# Patient Record
Sex: MALE | Race: WHITE | NOT HISPANIC OR LATINO | Employment: OTHER | URBAN - METROPOLITAN AREA
[De-identification: names, ages, dates, MRNs, and addresses within clinical notes are randomized per-mention and may not be internally consistent; named-entity substitution may affect disease eponyms.]

---

## 2018-02-14 ENCOUNTER — OFFICE VISIT CONVERTED (OUTPATIENT)
Dept: FAMILY MEDICINE CLINIC | Facility: CLINIC | Age: 53
End: 2018-02-14
Attending: FAMILY MEDICINE

## 2018-03-26 ENCOUNTER — OFFICE VISIT CONVERTED (OUTPATIENT)
Dept: FAMILY MEDICINE CLINIC | Facility: CLINIC | Age: 53
End: 2018-03-26
Attending: FAMILY MEDICINE

## 2018-08-07 ENCOUNTER — OFFICE VISIT CONVERTED (OUTPATIENT)
Dept: FAMILY MEDICINE CLINIC | Facility: CLINIC | Age: 53
End: 2018-08-07
Attending: FAMILY MEDICINE

## 2018-08-16 ENCOUNTER — OFFICE VISIT CONVERTED (OUTPATIENT)
Dept: FAMILY MEDICINE CLINIC | Facility: CLINIC | Age: 53
End: 2018-08-16
Attending: NURSE PRACTITIONER

## 2018-10-26 ENCOUNTER — OFFICE VISIT CONVERTED (OUTPATIENT)
Dept: FAMILY MEDICINE CLINIC | Facility: CLINIC | Age: 53
End: 2018-10-26
Attending: FAMILY MEDICINE

## 2019-03-01 ENCOUNTER — OFFICE VISIT CONVERTED (OUTPATIENT)
Dept: FAMILY MEDICINE CLINIC | Facility: CLINIC | Age: 54
End: 2019-03-01
Attending: FAMILY MEDICINE

## 2019-03-01 ENCOUNTER — HOSPITAL ENCOUNTER (OUTPATIENT)
Dept: FAMILY MEDICINE CLINIC | Facility: CLINIC | Age: 54
Discharge: HOME | End: 2019-03-01

## 2019-03-01 LAB
25(OH)D3 SERPL-MCNC: 29 NG/ML (ref 30–100)
ALBUMIN SERPL-MCNC: 4.1 G/DL (ref 3.5–5)
ALBUMIN/GLOB SERPL: 1 {RATIO} (ref 1.4–2.6)
ALP SERPL-CCNC: 95 U/L (ref 56–119)
ALT SERPL-CCNC: 12 U/L (ref 10–40)
ANION GAP SERPL CALC-SCNC: 17 MMOL/L (ref 8–19)
AST SERPL-CCNC: 15 U/L (ref 15–50)
BASOPHILS # BLD AUTO: 0.07 10*3/UL (ref 0–0.2)
BASOPHILS NFR BLD AUTO: 0.6 % (ref 0–3)
BILIRUB SERPL-MCNC: 0.5 MG/DL (ref 0.2–1.3)
BUN SERPL-MCNC: 8 MG/DL (ref 5–25)
BUN/CREAT SERPL: 13 {RATIO} (ref 6–20)
CALCIUM SERPL-MCNC: 9.5 MG/DL (ref 8.7–10.4)
CHLORIDE SERPL-SCNC: 101 MMOL/L (ref 99–111)
CHOLEST SERPL-MCNC: 169 MG/DL (ref 107–200)
CHOLEST/HDLC SERPL: 3.5 {RATIO} (ref 3–6)
CONV ABS IMM GRAN: 0.03 10*3/UL (ref 0–0.2)
CONV CO2: 25 MMOL/L (ref 22–32)
CONV CREATININE URINE, RANDOM: 164.3 MG/DL (ref 10–300)
CONV IMMATURE GRAN: 0.3 % (ref 0–1.8)
CONV MICROALBUM.,U,RANDOM: 34.4 MG/L (ref 0–20)
CONV TOTAL PROTEIN: 8.1 G/DL (ref 6.3–8.2)
CREAT UR-MCNC: 0.64 MG/DL (ref 0.7–1.2)
DEPRECATED RDW RBC AUTO: 45.5 FL (ref 35.1–43.9)
EOSINOPHIL # BLD AUTO: 0.53 10*3/UL (ref 0–0.7)
EOSINOPHIL # BLD AUTO: 4.5 % (ref 0–7)
ERYTHROCYTE [DISTWIDTH] IN BLOOD BY AUTOMATED COUNT: 14.2 % (ref 11.6–14.4)
EST. AVERAGE GLUCOSE BLD GHB EST-MCNC: 154 MG/DL
GFR SERPLBLD BASED ON 1.73 SQ M-ARVRAT: >60 ML/MIN/{1.73_M2}
GLOBULIN UR ELPH-MCNC: 4 G/DL (ref 2–3.5)
GLUCOSE SERPL-MCNC: 115 MG/DL (ref 70–99)
HBA1C MFR BLD: 18.2 G/DL (ref 14–18)
HBA1C MFR BLD: 7 % (ref 3.5–5.7)
HCT VFR BLD AUTO: 55.5 % (ref 42–52)
HDLC SERPL-MCNC: 48 MG/DL (ref 40–60)
LDLC SERPL CALC-MCNC: 93 MG/DL (ref 70–100)
LYMPHOCYTES # BLD AUTO: 4.05 10*3/UL (ref 1–5)
MCH RBC QN AUTO: 28.6 PG (ref 27–31)
MCHC RBC AUTO-ENTMCNC: 32.8 G/DL (ref 33–37)
MCV RBC AUTO: 87.1 FL (ref 80–96)
MICROALBUMIN/CREAT UR: 20.9 MG/G{CRE} (ref 0–25)
MONOCYTES # BLD AUTO: 0.78 10*3/UL (ref 0.2–1.2)
MONOCYTES NFR BLD AUTO: 6.6 % (ref 3–10)
NEUTROPHILS # BLD AUTO: 6.33 10*3/UL (ref 2–8)
NEUTROPHILS NFR BLD AUTO: 53.6 % (ref 30–85)
NRBC CBCN: 0 % (ref 0–0.7)
OSMOLALITY SERPL CALC.SUM OF ELEC: 287 MOSM/KG (ref 273–304)
PLATELET # BLD AUTO: 230 10*3/UL (ref 130–400)
PMV BLD AUTO: 12.1 FL (ref 9.4–12.4)
POTASSIUM SERPL-SCNC: 4.1 MMOL/L (ref 3.5–5.3)
RBC # BLD AUTO: 6.37 10*6/UL (ref 4.7–6.1)
SODIUM SERPL-SCNC: 139 MMOL/L (ref 135–147)
TRIGL SERPL-MCNC: 138 MG/DL (ref 40–150)
VARIANT LYMPHS NFR BLD MANUAL: 34.4 % (ref 20–45)
VLDLC SERPL-MCNC: 28 MG/DL (ref 5–37)
WBC # BLD AUTO: 11.79 10*3/UL (ref 4.8–10.8)

## 2019-05-06 ENCOUNTER — OFFICE VISIT CONVERTED (OUTPATIENT)
Dept: FAMILY MEDICINE CLINIC | Facility: CLINIC | Age: 54
End: 2019-05-06
Attending: FAMILY MEDICINE

## 2019-05-06 ENCOUNTER — CONVERSION ENCOUNTER (OUTPATIENT)
Dept: FAMILY MEDICINE CLINIC | Facility: CLINIC | Age: 54
End: 2019-05-06

## 2019-07-17 ENCOUNTER — OFFICE VISIT CONVERTED (OUTPATIENT)
Dept: FAMILY MEDICINE CLINIC | Facility: CLINIC | Age: 54
End: 2019-07-17
Attending: FAMILY MEDICINE

## 2019-09-23 ENCOUNTER — HOSPITAL ENCOUNTER (OUTPATIENT)
Dept: FAMILY MEDICINE CLINIC | Facility: CLINIC | Age: 54
Discharge: HOME | End: 2019-09-23

## 2019-09-23 ENCOUNTER — OFFICE VISIT CONVERTED (OUTPATIENT)
Dept: FAMILY MEDICINE CLINIC | Facility: CLINIC | Age: 54
End: 2019-09-23
Attending: FAMILY MEDICINE

## 2019-09-23 ENCOUNTER — CONVERSION ENCOUNTER (OUTPATIENT)
Dept: FAMILY MEDICINE CLINIC | Facility: CLINIC | Age: 54
End: 2019-09-23

## 2019-09-23 LAB
ALBUMIN SERPL-MCNC: 4.1 G/DL (ref 3.5–5)
ALBUMIN/GLOB SERPL: 1.2 {RATIO} (ref 1.4–2.6)
ALP SERPL-CCNC: 97 U/L (ref 56–119)
ALT SERPL-CCNC: 17 U/L (ref 10–40)
ANION GAP SERPL CALC-SCNC: 21 MMOL/L (ref 8–19)
AST SERPL-CCNC: 17 U/L (ref 15–50)
BILIRUB SERPL-MCNC: 0.31 MG/DL (ref 0.2–1.3)
BUN SERPL-MCNC: 19 MG/DL (ref 5–25)
BUN/CREAT SERPL: 22 {RATIO} (ref 6–20)
CALCIUM SERPL-MCNC: 9.5 MG/DL (ref 8.7–10.4)
CHLORIDE SERPL-SCNC: 103 MMOL/L (ref 99–111)
CHOLEST SERPL-MCNC: 137 MG/DL (ref 107–200)
CHOLEST/HDLC SERPL: 3 {RATIO} (ref 3–6)
CONV CO2: 20 MMOL/L (ref 22–32)
CONV CREATININE URINE, RANDOM: 56.1 MG/DL (ref 10–300)
CONV MICROALBUM.,U,RANDOM: <12 MG/L (ref 0–20)
CONV TOTAL PROTEIN: 7.6 G/DL (ref 6.3–8.2)
CREAT UR-MCNC: 0.86 MG/DL (ref 0.7–1.2)
EST. AVERAGE GLUCOSE BLD GHB EST-MCNC: 197 MG/DL
GFR SERPLBLD BASED ON 1.73 SQ M-ARVRAT: >60 ML/MIN/{1.73_M2}
GLOBULIN UR ELPH-MCNC: 3.5 G/DL (ref 2–3.5)
GLUCOSE SERPL-MCNC: 164 MG/DL (ref 70–99)
HBA1C MFR BLD: 8.5 % (ref 3.5–5.7)
HDLC SERPL-MCNC: 45 MG/DL (ref 40–60)
LDLC SERPL CALC-MCNC: 46 MG/DL (ref 70–100)
MICROALBUMIN/CREAT UR: 21.4 MG/G{CRE} (ref 0–25)
OSMOLALITY SERPL CALC.SUM OF ELEC: 296 MOSM/KG (ref 273–304)
POTASSIUM SERPL-SCNC: 4.2 MMOL/L (ref 3.5–5.3)
SODIUM SERPL-SCNC: 140 MMOL/L (ref 135–147)
TRIGL SERPL-MCNC: 231 MG/DL (ref 40–150)
VLDLC SERPL-MCNC: 46 MG/DL (ref 5–37)

## 2020-02-04 ENCOUNTER — OFFICE VISIT CONVERTED (OUTPATIENT)
Dept: FAMILY MEDICINE CLINIC | Facility: CLINIC | Age: 55
End: 2020-02-04
Attending: FAMILY MEDICINE

## 2020-02-04 ENCOUNTER — HOSPITAL ENCOUNTER (OUTPATIENT)
Dept: FAMILY MEDICINE CLINIC | Facility: CLINIC | Age: 55
Discharge: HOME | End: 2020-02-04
Attending: FAMILY MEDICINE

## 2020-02-04 LAB
ALBUMIN SERPL-MCNC: 4.4 G/DL (ref 3.5–5)
ALBUMIN/GLOB SERPL: 1.3 {RATIO} (ref 1.4–2.6)
ALP SERPL-CCNC: 97 U/L (ref 56–119)
ALT SERPL-CCNC: 14 U/L (ref 10–40)
AMPHETAMINES UR QL SCN: NEGATIVE
ANION GAP SERPL CALC-SCNC: 26 MMOL/L (ref 8–19)
AST SERPL-CCNC: 19 U/L (ref 15–50)
BARBITURATES UR QL SCN: NEGATIVE
BASOPHILS # BLD AUTO: 0.11 10*3/UL (ref 0–0.2)
BASOPHILS NFR BLD AUTO: 0.9 % (ref 0–3)
BENZODIAZ UR QL SCN: NEGATIVE
BILIRUB SERPL-MCNC: 0.4 MG/DL (ref 0.2–1.3)
BUN SERPL-MCNC: 10 MG/DL (ref 5–25)
BUN/CREAT SERPL: 14 {RATIO} (ref 6–20)
CALCIUM SERPL-MCNC: 9.7 MG/DL (ref 8.7–10.4)
CHLORIDE SERPL-SCNC: 101 MMOL/L (ref 99–111)
CHOLEST SERPL-MCNC: 148 MG/DL (ref 107–200)
CHOLEST/HDLC SERPL: 3.5 {RATIO} (ref 3–6)
CONV ABS IMM GRAN: 0.05 10*3/UL (ref 0–0.2)
CONV CO2: 19 MMOL/L (ref 22–32)
CONV COCAINE, UR: NEGATIVE
CONV CREATININE URINE, RANDOM: 181.3 MG/DL (ref 10–300)
CONV IMMATURE GRAN: 0.4 % (ref 0–1.8)
CONV MICROALBUM.,U,RANDOM: 75.3 MG/L (ref 0–20)
CONV TOTAL PROTEIN: 7.9 G/DL (ref 6.3–8.2)
CREAT UR-MCNC: 0.69 MG/DL (ref 0.7–1.2)
DEPRECATED RDW RBC AUTO: 44.4 FL (ref 35.1–43.9)
EOSINOPHIL # BLD AUTO: 0.68 10*3/UL (ref 0–0.7)
EOSINOPHIL # BLD AUTO: 5.6 % (ref 0–7)
ERYTHROCYTE [DISTWIDTH] IN BLOOD BY AUTOMATED COUNT: 14 % (ref 11.6–14.4)
EST. AVERAGE GLUCOSE BLD GHB EST-MCNC: 209 MG/DL
GFR SERPLBLD BASED ON 1.73 SQ M-ARVRAT: >60 ML/MIN/{1.73_M2}
GLOBULIN UR ELPH-MCNC: 3.5 G/DL (ref 2–3.5)
GLUCOSE SERPL-MCNC: 172 MG/DL (ref 70–99)
HBA1C MFR BLD: 8.9 % (ref 3.5–5.7)
HCT VFR BLD AUTO: 52.3 % (ref 42–52)
HDLC SERPL-MCNC: 42 MG/DL (ref 40–60)
HGB BLD-MCNC: 17.1 G/DL (ref 14–18)
LDLC SERPL CALC-MCNC: 73 MG/DL (ref 70–100)
LYMPHOCYTES # BLD AUTO: 4.5 10*3/UL (ref 1–5)
LYMPHOCYTES NFR BLD AUTO: 37 % (ref 20–45)
MCH RBC QN AUTO: 28.6 PG (ref 27–31)
MCHC RBC AUTO-ENTMCNC: 32.7 G/DL (ref 33–37)
MCV RBC AUTO: 87.6 FL (ref 80–96)
METHADONE UR QL SCN: NEGATIVE
MICROALBUMIN/CREAT UR: 41.5 MG/G{CRE} (ref 0–25)
MONOCYTES # BLD AUTO: 0.98 10*3/UL (ref 0.2–1.2)
MONOCYTES NFR BLD AUTO: 8.1 % (ref 3–10)
NEUTROPHILS # BLD AUTO: 5.83 10*3/UL (ref 2–8)
NEUTROPHILS NFR BLD AUTO: 48 % (ref 30–85)
NRBC CBCN: 0 % (ref 0–0.7)
OPIATES TESTED UR SCN: NEGATIVE
OSMOLALITY SERPL CALC.SUM OF ELEC: 295 MOSM/KG (ref 273–304)
OXYCODONE UR QL SCN: NEGATIVE
PCP UR QL: NEGATIVE
PLATELET # BLD AUTO: 236 10*3/UL (ref 130–400)
PMV BLD AUTO: 12.1 FL (ref 9.4–12.4)
POTASSIUM SERPL-SCNC: 4.5 MMOL/L (ref 3.5–5.3)
PSA SERPL-MCNC: 0.57 NG/ML (ref 0–4)
RBC # BLD AUTO: 5.97 10*6/UL (ref 4.7–6.1)
SODIUM SERPL-SCNC: 141 MMOL/L (ref 135–147)
T4 FREE SERPL-MCNC: 1 NG/DL (ref 0.9–1.8)
THC SERPLBLD CFM-MCNC: NEGATIVE NG/ML
TRIGL SERPL-MCNC: 165 MG/DL (ref 40–150)
TSH SERPL-ACNC: 3.88 M[IU]/L (ref 0.27–4.2)
VLDLC SERPL-MCNC: 33 MG/DL (ref 5–37)
WBC # BLD AUTO: 12.15 10*3/UL (ref 4.8–10.8)

## 2020-03-12 ENCOUNTER — OFFICE VISIT CONVERTED (OUTPATIENT)
Dept: FAMILY MEDICINE CLINIC | Facility: CLINIC | Age: 55
End: 2020-03-12
Attending: FAMILY MEDICINE

## 2020-07-15 ENCOUNTER — CONVERSION ENCOUNTER (OUTPATIENT)
Dept: GASTROENTEROLOGY | Facility: CLINIC | Age: 55
End: 2020-07-15
Attending: INTERNAL MEDICINE

## 2020-08-18 ENCOUNTER — OFFICE VISIT CONVERTED (OUTPATIENT)
Dept: FAMILY MEDICINE CLINIC | Facility: CLINIC | Age: 55
End: 2020-08-18
Attending: FAMILY MEDICINE

## 2020-08-18 ENCOUNTER — HOSPITAL ENCOUNTER (OUTPATIENT)
Dept: FAMILY MEDICINE CLINIC | Facility: CLINIC | Age: 55
Discharge: HOME | End: 2020-08-18
Attending: FAMILY MEDICINE

## 2020-08-18 ENCOUNTER — CONVERSION ENCOUNTER (OUTPATIENT)
Dept: FAMILY MEDICINE CLINIC | Facility: CLINIC | Age: 55
End: 2020-08-18

## 2020-08-18 LAB
ALBUMIN SERPL-MCNC: 4.1 G/DL (ref 3.5–5)
ALBUMIN/GLOB SERPL: 1.2 {RATIO} (ref 1.4–2.6)
ALP SERPL-CCNC: 104 U/L (ref 56–119)
ALT SERPL-CCNC: 18 U/L (ref 10–40)
ANION GAP SERPL CALC-SCNC: 20 MMOL/L (ref 8–19)
AST SERPL-CCNC: 23 U/L (ref 15–50)
BASOPHILS # BLD AUTO: 0.06 10*3/UL (ref 0–0.2)
BASOPHILS NFR BLD AUTO: 0.6 % (ref 0–3)
BILIRUB SERPL-MCNC: 0.47 MG/DL (ref 0.2–1.3)
BUN SERPL-MCNC: 8 MG/DL (ref 5–25)
BUN/CREAT SERPL: 9 {RATIO} (ref 6–20)
CALCIUM SERPL-MCNC: 10.2 MG/DL (ref 8.7–10.4)
CHLORIDE SERPL-SCNC: 95 MMOL/L (ref 99–111)
CHOLEST SERPL-MCNC: 264 MG/DL (ref 107–200)
CHOLEST/HDLC SERPL: 7.3 {RATIO} (ref 3–6)
CONV ABS IMM GRAN: 0.03 10*3/UL (ref 0–0.2)
CONV CO2: 27 MMOL/L (ref 22–32)
CONV IMMATURE GRAN: 0.3 % (ref 0–1.8)
CONV TOTAL PROTEIN: 7.6 G/DL (ref 6.3–8.2)
CREAT UR-MCNC: 0.86 MG/DL (ref 0.7–1.2)
DEPRECATED RDW RBC AUTO: 42.5 FL (ref 35.1–43.9)
EOSINOPHIL # BLD AUTO: 0.5 10*3/UL (ref 0–0.7)
EOSINOPHIL # BLD AUTO: 5.3 % (ref 0–7)
ERYTHROCYTE [DISTWIDTH] IN BLOOD BY AUTOMATED COUNT: 13.6 % (ref 11.6–14.4)
EST. AVERAGE GLUCOSE BLD GHB EST-MCNC: 312 MG/DL
GFR SERPLBLD BASED ON 1.73 SQ M-ARVRAT: >60 ML/MIN/{1.73_M2}
GLOBULIN UR ELPH-MCNC: 3.5 G/DL (ref 2–3.5)
GLUCOSE SERPL-MCNC: 332 MG/DL (ref 70–99)
HBA1C MFR BLD: 12.5 % (ref 3.5–5.7)
HCT VFR BLD AUTO: 51.5 % (ref 42–52)
HDLC SERPL-MCNC: 36 MG/DL (ref 40–60)
HGB BLD-MCNC: 16.9 G/DL (ref 14–18)
LDLC SERPL CALC-MCNC: 138 MG/DL (ref 70–100)
LYMPHOCYTES # BLD AUTO: 3.55 10*3/UL (ref 1–5)
LYMPHOCYTES NFR BLD AUTO: 37.9 % (ref 20–45)
MCH RBC QN AUTO: 28.6 PG (ref 27–31)
MCHC RBC AUTO-ENTMCNC: 32.8 G/DL (ref 33–37)
MCV RBC AUTO: 87.1 FL (ref 80–96)
MONOCYTES # BLD AUTO: 0.77 10*3/UL (ref 0.2–1.2)
MONOCYTES NFR BLD AUTO: 8.2 % (ref 3–10)
NEUTROPHILS # BLD AUTO: 4.46 10*3/UL (ref 2–8)
NEUTROPHILS NFR BLD AUTO: 47.7 % (ref 30–85)
NRBC CBCN: 0 % (ref 0–0.7)
OSMOLALITY SERPL CALC.SUM OF ELEC: 297 MOSM/KG (ref 273–304)
PLATELET # BLD AUTO: 258 10*3/UL (ref 130–400)
PMV BLD AUTO: 11.9 FL (ref 9.4–12.4)
POTASSIUM SERPL-SCNC: 4.3 MMOL/L (ref 3.5–5.3)
RBC # BLD AUTO: 5.91 10*6/UL (ref 4.7–6.1)
SODIUM SERPL-SCNC: 138 MMOL/L (ref 135–147)
T4 FREE SERPL-MCNC: 1 NG/DL (ref 0.9–1.8)
TRIGL SERPL-MCNC: 447 MG/DL (ref 40–150)
TSH SERPL-ACNC: 2.77 M[IU]/L (ref 0.27–4.2)
WBC # BLD AUTO: 9.37 10*3/UL (ref 4.8–10.8)

## 2021-04-13 ENCOUNTER — HOSPITAL ENCOUNTER (OUTPATIENT)
Dept: FAMILY MEDICINE CLINIC | Facility: CLINIC | Age: 56
Discharge: HOME | End: 2021-04-13
Attending: FAMILY MEDICINE

## 2021-04-13 ENCOUNTER — OFFICE VISIT CONVERTED (OUTPATIENT)
Dept: FAMILY MEDICINE CLINIC | Facility: CLINIC | Age: 56
End: 2021-04-13
Attending: FAMILY MEDICINE

## 2021-04-13 LAB
ALBUMIN SERPL-MCNC: 4 G/DL (ref 3.5–5)
ALBUMIN/GLOB SERPL: 1 {RATIO} (ref 1.4–2.6)
ALP SERPL-CCNC: 116 U/L (ref 56–119)
ALT SERPL-CCNC: 14 U/L (ref 10–40)
AMPHETAMINES UR QL SCN: NEGATIVE
ANION GAP SERPL CALC-SCNC: 24 MMOL/L (ref 8–19)
AST SERPL-CCNC: 14 U/L (ref 15–50)
BARBITURATES UR QL SCN: NEGATIVE
BASOPHILS # BLD AUTO: 0.07 10*3/UL (ref 0–0.2)
BASOPHILS NFR BLD AUTO: 0.6 % (ref 0–3)
BENZODIAZ UR QL SCN: NEGATIVE
BILIRUB SERPL-MCNC: 0.29 MG/DL (ref 0.2–1.3)
BUN SERPL-MCNC: 12 MG/DL (ref 5–25)
BUN/CREAT SERPL: 16 {RATIO} (ref 6–20)
CALCIUM SERPL-MCNC: 9.5 MG/DL (ref 8.7–10.4)
CHLORIDE SERPL-SCNC: 98 MMOL/L (ref 99–111)
CHOLEST SERPL-MCNC: 241 MG/DL (ref 107–200)
CHOLEST/HDLC SERPL: 5.2 {RATIO} (ref 3–6)
CONV ABS IMM GRAN: 0.06 10*3/UL (ref 0–0.2)
CONV CO2: 20 MMOL/L (ref 22–32)
CONV COCAINE, UR: NEGATIVE
CONV CREATININE URINE, RANDOM: 52 MG/DL (ref 10–300)
CONV IMMATURE GRAN: 0.6 % (ref 0–1.8)
CONV MICROALBUM.,U,RANDOM: 38.2 MG/L (ref 0–20)
CONV TOTAL PROTEIN: 7.9 G/DL (ref 6.3–8.2)
CREAT UR-MCNC: 0.77 MG/DL (ref 0.7–1.2)
DEPRECATED RDW RBC AUTO: 41.9 FL (ref 35.1–43.9)
EOSINOPHIL # BLD AUTO: 0.51 10*3/UL (ref 0–0.7)
EOSINOPHIL # BLD AUTO: 4.7 % (ref 0–7)
ERYTHROCYTE [DISTWIDTH] IN BLOOD BY AUTOMATED COUNT: 13.6 % (ref 11.6–14.4)
EST. AVERAGE GLUCOSE BLD GHB EST-MCNC: 289 MG/DL
GFR SERPLBLD BASED ON 1.73 SQ M-ARVRAT: >60 ML/MIN/{1.73_M2}
GLOBULIN UR ELPH-MCNC: 3.9 G/DL (ref 2–3.5)
GLUCOSE SERPL-MCNC: 360 MG/DL (ref 70–99)
HBA1C MFR BLD: 11.7 % (ref 3.5–5.7)
HCT VFR BLD AUTO: 53.4 % (ref 42–52)
HDLC SERPL-MCNC: 46 MG/DL (ref 40–60)
HGB BLD-MCNC: 17.6 G/DL (ref 14–18)
LDLC SERPL CALC-MCNC: 130 MG/DL (ref 70–100)
LYMPHOCYTES # BLD AUTO: 4.33 10*3/UL (ref 1–5)
LYMPHOCYTES NFR BLD AUTO: 39.9 % (ref 20–45)
MCH RBC QN AUTO: 28.3 PG (ref 27–31)
MCHC RBC AUTO-ENTMCNC: 33 G/DL (ref 33–37)
MCV RBC AUTO: 86 FL (ref 80–96)
METHADONE UR QL SCN: NEGATIVE
MICROALBUMIN/CREAT UR: 73.5 MG/G{CRE} (ref 0–25)
MONOCYTES # BLD AUTO: 0.77 10*3/UL (ref 0.2–1.2)
MONOCYTES NFR BLD AUTO: 7.1 % (ref 3–10)
NEUTROPHILS # BLD AUTO: 5.12 10*3/UL (ref 2–8)
NEUTROPHILS NFR BLD AUTO: 47.1 % (ref 30–85)
NRBC CBCN: 0 % (ref 0–0.7)
OPIATES TESTED UR SCN: NEGATIVE
OSMOLALITY SERPL CALC.SUM OF ELEC: 298 MOSM/KG (ref 273–304)
OXYCODONE UR QL SCN: NEGATIVE
PCP UR QL: NEGATIVE
PLATELET # BLD AUTO: 308 10*3/UL (ref 130–400)
PMV BLD AUTO: 11.8 FL (ref 9.4–12.4)
POTASSIUM SERPL-SCNC: 4.5 MMOL/L (ref 3.5–5.3)
PSA SERPL-MCNC: 0.55 NG/ML (ref 0–4)
RBC # BLD AUTO: 6.21 10*6/UL (ref 4.7–6.1)
SODIUM SERPL-SCNC: 137 MMOL/L (ref 135–147)
T4 FREE SERPL-MCNC: 1.1 NG/DL (ref 0.9–1.8)
THC SERPLBLD CFM-MCNC: NEGATIVE NG/ML
TRIGL SERPL-MCNC: 324 MG/DL (ref 40–150)
TSH SERPL-ACNC: 2.66 M[IU]/L (ref 0.27–4.2)
VLDLC SERPL-MCNC: 65 MG/DL (ref 5–37)
WBC # BLD AUTO: 10.86 10*3/UL (ref 4.8–10.8)

## 2021-04-30 ENCOUNTER — CONVERSION ENCOUNTER (OUTPATIENT)
Dept: FAMILY MEDICINE CLINIC | Facility: CLINIC | Age: 56
End: 2021-04-30

## 2021-04-30 ENCOUNTER — OFFICE VISIT CONVERTED (OUTPATIENT)
Dept: FAMILY MEDICINE CLINIC | Facility: CLINIC | Age: 56
End: 2021-04-30
Attending: FAMILY MEDICINE

## 2021-05-03 ENCOUNTER — HOSPITAL ENCOUNTER (OUTPATIENT)
Dept: OTHER | Facility: HOSPITAL | Age: 56
Discharge: HOME | End: 2021-05-03
Attending: FAMILY MEDICINE

## 2021-05-07 NOTE — PROGRESS NOTES
Progress Note      Patient Name: Zechariah Fleming Jr.   Patient ID: [id number]   Sex: Male   YOB: 1965    Primary Care Provider: Jaci Talbert MD   Referring Provider: Jaci Talbert MD    Visit Date: April 30, 2021    Provider: Robert García MD   Location: [facility]   Location Address: [address] [place] [address] [place]  [id number]   Location Phone: [phone]          Chief Complaint  · Annual Wellness Exam      History Of Present Illness  The patient is a 55 year old /White male who has come to this office for his Annual Wellness Visit.   His Primary Care Provider is Robert García MD. His comprehensive Care Team list, including suppliers, has been updated on the Facesheet. His medical/family history, height, weight, BMI, and blood pressure have been reviewed and are in the chart. The Health Risk Assessment has been completed and scanned in the chart.   Medications are listed in the medication list.   The active problem list includes: Diabetes, Hyperlipemia, and Reflux   The patient does not have a history of substance use.   Patient reports his diet is adequate.   The Mini-Cog has been administered and is scanned in chart. The results are negative. His cognitive function is without limitation.   A hearing loss screen was completed today and the result is negative.   Patient does not have any risk factors for depression. Patient completed the PHQ-9 today and it has been scanned in the chart. The total score is 5-9.   The GAIT SCREENING TOOL was administered today and the result is negative.   The Rg Index of Reagan in ADLs indicated full function (score of 6).   A Falls Risk Assessment has been completed, including a review of home fall hazards and medication review.   Overall, the patient's functional ability is noted by this provider to be within normal limits. His level of safety is noted to be within normal limits. His balance/gait is within  "normal limits. There have been no falls in the past year. Patient-specific home safety recommendations have been reviewed and a copy has been given to patient.   He denies issues with leaking urine.   There are no additional risk factors identified.   Living Will/Advanced Directive has not previously been completed.   Personalized health advice was given to the patient and a written health screening schedule was established; see Plan for details.      urged pt to quit smoking- pt refused   Zechariah Fleming Jr. is a 55 year old /White male who presents for evaluation and treatment of:       Past Medical History  Disease Name Date Onset Notes   Diabetes --  --    Hyperlipemia --  --    Peripheral neuropathy --  --    Reflux --  --          Past Surgical History  Procedure Name Date Notes   Shoulder surgery --  --          Medication List  Name Date Started Instructions   atenolol 25 mg oral tablet 04/13/2021 take 1 tablet (25 mg) by oral route once daily for 30 days   BD Ultra-Fine Micro Pen Needle 32 gauge x 1/4\" miscellaneous needle 04/13/2021 use as directed for 30 days qd   Januvia 100 mg oral tablet 04/13/2021 take 1 tablet (100 mg) by oral route once daily for 30 days   Jardiance 25 mg oral tablet 04/13/2021 take 1 tablet (25 mg) by oral route once daily in the morning for 30 days   lisinopril 5 mg oral tablet 04/13/2021 take 1 tablet (5 mg) by oral route once daily for 30 days   Lyrica 300 mg oral capsule 04/13/2021 take 1 capsule by oral route 2 times a day as needed for 30 days neuropathy pain   Naprosyn 500 mg oral tablet 04/13/2021 take 1 tablet (500 mg) by oral route 2 times per day with food for 30 days   pravastatin 80 mg oral tablet 04/13/2021 take 1 tablet (80 mg) by oral route once daily at bedtime for 30 days   sildenafil 25 mg oral tablet 04/30/2021 take 1 tablet (25 mg) by oral route once daily as needed approximately 1 hour before sexual activity for 30 days   Tresiba FlexTouch U-100 100 " "unit/mL (3 mL) subcutaneous insulin pen 04/13/2021 inject 36 units subcutaneously once daily for 30 days   Viagra 100 mg oral tablet 04/13/2021 take 1 tablet (100 mg) by oral route once daily as needed approximately 1 hour before sexual activity for 30 days         Allergy List  Allergen Name Date Reaction Notes   NO KNOWN DRUG ALLERGIES --  --  --          Social History  Finding Status Start/Stop Quantity Notes   Alcohol Former --/-- --  --    Tobacco Current every day --/-- 1.5 pk daily --          Immunizations  NameDate Admin Mfg Trade Name Lot Number Route Inj VIS Given VIS Publication   Ajwinxgho19/05/2019 UNK Unknown TradeName [id number] NE NE 02/04/2020    Comments: [facility]         Review of Systems  · Constitutional  o Denies  o : fatigue, fever  · Cardiovascular  o Denies  o : chest pain, palpitations  · Respiratory  o Denies  o : shortness of breath, cough  · Gastrointestinal  o Denies  o : nausea, vomiting, diarrhea  · Psychiatric  o Denies  o : anxiety, depression      Vitals  Date Time BP Position Site L\R Cuff Size HR RR TEMP (F) WT  HT  BMI kg/m2 BSA m2 O2 Sat FR L/min FiO2 HC       04/30/2021 08:22 /82 Sitting    76 - R  96.9 207lbs 2oz 5'  7.25\" 32.2 2.11 99 %            Physical Examination  · Respiratory  o Respiratory Effort  o : breathing unlabored  o Auscultation of Lungs  o : clear to ascultation  · Cardiovascular  o Heart  o :   § Auscultation of Heart  § : regular rate and rhythm  o Peripheral Vascular System  o :   § Extremities  § : no edema  · Gastrointestinal  o Abdomen  o : soft, non-tender, non-distended, + bowel sounds, no hepatosplenomegaly, no masses palpated  · Musculoskeletal  o General  o :   § General Musculoskeletal  § : No joint swelling or deformity., Muscle tone, strength, and development grossly normal.  · Neurologic  o Gait and Station  o :   § Gait Screening  § : normal gait  · Psychiatric  o Mood and Affect  o : mood normal, affect " appropriate          Assessment  · Encounter for Medicare annual wellness exam     V70.0/Z00.00  · Screening for depression     V79.0/Z13.89  · Screening for alcoholism     V79.1/Z13.39  · Screen for colon cancer     V76.51/Z12.11      Plan  · Orders  o Falls Risk Assessment Completed (3288F) - V70.0/Z00.00 - 04/30/2021  o Brief hearing screening (written) [facility] () - V70.0/Z00.00 - 04/30/2021  o Annual Wellness Visit-includes a Personalized Prevention Plan of Service (PPS), SUBSEQUENT VISIT (Medicare) () - V70.0/Z00.00 - 04/30/2021  o Presence or absence of urinary incontinence assessed (OZZIE) (1090F) - V70.0/Z00.00 - 04/30/2021  o Negative alcohol screening () - V79.1/Z13.39 - 04/30/2021  o ACO-39: Current medications updated and reviewed (1159F, ) - - 04/30/2021  o ACO-19: Colorectal cancer screening results documented and reviewed (3017F) - - 04/30/2021  o ACO-14: Influenza immunization was not administered for reasons documented () - - 04/30/2021  o ACO-13: Fall Risk Screening with 2 or more falls in past year or any fall with injury in the past year (1100F) - - 04/30/2021  o ACO-18: Negative screen for clinical depression using a standardized tool () - - 04/30/2021   5  o Cologuard (, 73934) - V76.51/Z12.11 - 04/30/2021  · Medications  o Medications have been Reconciled  o Transition of Care or Provider Policy  · Instructions  o Health Risk Assessment has been reviewed with the patient.  o Written health screening schedule for next 5-10 years was established with patient; information scanned in chart and given/mailed to patient.  o Fall prevention methods discussed and a copy of recommendations given/mailed to patient.  o Patient was educated/instructed on their diagnosis, treatment and medications prior to discharge from the clinic today.            Electronically Signed by: Robert García MD -Author on April 30, 2021 09:00:40 AM

## 2021-05-07 NOTE — PROGRESS NOTES
"   Progress Note      Patient Name: Zechariah Fleming   Patient ID: [id number]   Sex: Male   YOB: 1965        Visit Date: August 7, 2018    Provider: Jaci Talbert MD   Location: [facility]   Location Address: [address] [place] [address]  [id number]   Location Phone: [phone]          Chief Complaint     Refill all meds  Has  had worsening fatigued for past few months       History Of Present Illness  Zechariah Fleming is a 52 year old /White male who presents for evaluation and treatment of:      They have read all the labels on his medicines and are wondering if the Lyrica might be causing it.  He will stop it and see if it gets better.  He has been so tired that he doesn't want to get up in the AM.  He doesn't sleep well.  He toss and turns.  He doesn't snore.  No blurred vision.  No frequent urination.  It is normal.  He has lots of bone pain.  He hurts in his fingers and his whole body aches in the bones.  Dr. Garduno said he had really bad athritis.  He is using Tresiba 36 units QD.       Past Medical History  Disease Name Date Onset Notes   Diabetes --  --    Hyperlipemia --  --    Peripheral neuropathy --  --    Reflux --  --          Past Surgical History  Procedure Name Date Notes   Shoulder surgery --  --          Medication List  Name Date Started Instructions   atenolol 25 mg oral tablet 11/21/2017 take 1 tablet (25 mg) by oral route once daily for 90 days   atenolol 50 mg oral tablet 05/12/2018 take 1 tablet (25 mg) by oral route once daily for 90 days   Augmentin 875-125 mg oral tablet 03/26/2018 take 1 tablet by oral route every 12 hours for 7 days   BD Ultra-Fine Mariza Pen Needles 32 gauge x 5/32\" miscellaneous needle 03/05/2018 use as directed for 30 days   Hyoscyamine 0.125 MG oral tablet 11/21/2017 dissolve 1 tablet under tongue 3 times a day as needed   Januvia 100 mg oral tablet 12/22/2017 take 1 tablet (100 mg) by oral route once daily for " "30 days   Jardiance 25 mg oral tablet 11/21/2017 take 1 tablet (25 mg) by oral route once daily in the morning for 90 days   loratadine 10 mg oral tablet 07/20/2018 take 1 tablet (10 mg) by oral route once daily for 90 days   Lyrica 100 mg oral capsule 05/25/2018 take 1 capsule (100 mg) by oral route 3 times per day for 30 days   Lyrica 300 mg oral capsule 11/21/2017 take 1 capsule (300 mg) by oral route 2 times per day for 30 days   Naprosyn 500 mg oral tablet 12/22/2017 take 1 tablet (500 mg) by oral route 2 times per day with food for 30 days   omeprazole 20 mg oral capsule,delayed release(DR/EC)  --    pravastatin 40 mg oral tablet 02/14/2018 take 1 tablet (40 mg) by oral route once daily at bedtime for 30 days   Tresiba FlexTouch U-100 100 unit/mL (3 mL) subcutaneous insulin pen 07/16/2018 inject 22 units subcutaneously once daily for 30 days         Social History  Finding Status Start/Stop Quantity Notes   Alcohol Former --/-- --  --    Tobacco Current every day --/-- --  --          Vitals  Date Time BP Position Site L\R Cuff Size HR RR TEMP(F) WT  HT  BMI kg/m2 BSA m2 O2 Sat        08/07/2018 09:11 /74 Sitting    86 - R  97 218lbs 0oz 5'  7.5\" 33.64 2.17 96 %           Physical Examination  · Constitutional  o Appearance  o : well developed, well-nourished, in no acute distress Color ok  · Eyes  o Conjunctivae  o : conjunctivae normal  · Neck  o Inspection/Palpation  o : supple  o Thyroid  o : no thyromegaly  · Respiratory  o Respiratory Effort  o : breathing unlabored  o Auscultation of Lungs  o : clear to ascultation  · Cardiovascular  o Heart  o :   § Auscultation of Heart  § : regular rate and rhythm  o Peripheral Vascular System  o :   § Extremities  § : no edema  · Lymphatic  o Neck  o : no lymphadenopathy present  · Musculoskeletal  o General  o :   § General Musculoskeletal  § : grossly normal.  · Skin and Subcutaneous Tissue  o General Inspection  o : tanned  · Neurologic  o Gait and " "Station  o :   § Gait Screening  § : normal gait  · Psychiatric  o Mood and Affect  o : mood normal, affect appropriate          Assessment  · Diabetes mellitus, type 2     250.00/E11.9  · Fatigue     780.79/R53.83  · Hyperlipidemia     272.4/E78.5  · Bone pain     733.90/M89.8X9  · Screening for prostate cancer     V76.44/Z12.5      Plan  · Orders  o Diabetic Foot (Motor and Sensory) Exam Completed [facility] (, , 2028F) - 250.00/E11.9 - 08/07/2018  o ACO-41: Dilated Diabetic eye exam completed this year and results in chart/reviewed (2022F) - 250.00/E11.9 - 08/07/2018  o CBC with Auto Diff [facility] (52408) - 250.00/E11.9 - 08/07/2018  o CMP [facility] (47061) - 250.00/E11.9, 272.4/E78.5 - 08/07/2018  o Hgb A1c [facility] (01672) - 250.00/E11.9 - 08/07/2018  o Lipid Panel [facility] (64292) - - 08/07/2018  o TSH [facility] (84573) - 780.79/R53.83 - 08/07/2018  o Vitamin D (25-Hydroxy) Level (68627) - 780.79/R53.83, 733.90/M89.8X9 - 08/07/2018  o ACO-39: Current medications updated and reviewed () - - 08/07/2018  o Testosterone (Total) (45642) - 780.79/R53.83 - 08/07/2018  o PSA Screening, Ultrasensitive, MEDICARE [facility] () - - 08/07/2018  · Medications  o BD Ultra-Fine Micro Pen Needle 32 gauge x 1/4\" miscellaneous needle   SIG: use as directed for 30 days qd   DISP: (1) 100 ct box with 0 refills  Prescribed on 08/07/2018     o atenolol 25 mg oral tablet   SIG: take 1 tablet (25 mg) by oral route once daily for 90 days   DISP: (90) tablet with 1 refills  Adjusted on 08/07/2018     o Januvia 100 mg oral tablet   SIG: take 1 tablet (100 mg) by oral route once daily for 30 days   DISP: (30) tablets with 5 refills  Adjusted on 08/07/2018     o Jardiance 25 mg oral tablet   SIG: take 1 tablet (25 mg) by oral route once daily in the morning for 90 days   DISP: (90) tablet with 1 refills  Adjusted on 08/07/2018     o Lyrica 100 mg oral capsule   SIG: take 1 capsule by oral route 2 times a day for 30 days   DISP: (60) capsules with 5 " refills  Adjusted on 08/07/2018     o pravastatin 40 mg oral tablet   SIG: take 1 tablet (40 mg) by oral route once daily at bedtime for 90 days   DISP: (90) tablet with 5 refills  Adjusted on 08/07/2018     o Tresiba FlexTouch U-100 100 unit/mL (3 mL) subcutaneous insulin pen   SIG: inject 36 units subcutaneously once daily for 30 days   DISP: (4) Insulin pens with 5 refills  Adjusted on 08/07/2018     o atenolol 50 mg oral tablet   SIG: take 1 tablet (25 mg) by oral route once daily for 90 days   DISP: (45) Tablet with 1 refills  Discontinued on 08/07/2018     o Lyrica 300 mg oral capsule   SIG: take 1 capsule (300 mg) by oral route 2 times per day for 30 days   DISP: (60) capsules with 5 refills  Discontinued on 08/07/2018     · Instructions  o Advised that cheeses and other sources of dairy fats, animal fats, fast food, and the extras (candy, pasteries, pies, doughnuts and cookies) all contain LDL raising nutrients. Advised to increase fruits, vegetables, whole grains, and to monitor portion sizes.   o Patient was educated/instructed on their diagnosis, treatment and medications prior to discharge from the clinic today.            Electronically Signed by: Jaci Talbert MD -Author on August 7, 2018 03:05:49 PM

## 2021-05-07 NOTE — PROGRESS NOTES
"   Progress Note      Patient Name: Zechariah Fleming Jr.   Patient ID: [id number]   Sex: Male   YOB: 1965    Primary Care Provider: Jaci Talbert MD   Referring Provider: Jaci Talbert MD    Visit Date: February 4, 2020    Provider: Robert García MD   Location: [facility]   Location Address: [address] [place] [address] [place]  [address]   Location Phone: [phone]          Chief Complaint     med refills  fasting labs  handicap form for placard; neuropathy in feet. issued by Dr Talbert.       History Of Present Illness  Zechariah Fleming Jr. is a 54 year old /White male who presents for evaluation and treatment of:      pt has chronic ulcers on skin over body x several months- sudden onset- worsening symptoms  DM II- unknown control- need labs to assess- needs to see eye doctor  hyperlipidemia- unknown control  depression- uncontrolled- no SI or HI  diabetic neuropathy pain controlled on lyrica- pt taking med as supposed to- pt not showing signs of addiction       Past Medical History  Disease Name Date Onset Notes   Diabetes --  --    Hyperlipemia --  --    Peripheral neuropathy --  --    Reflux --  --          Past Surgical History  Procedure Name Date Notes   Shoulder surgery --  --          Medication List  Name Date Started Instructions   atenolol 25 mg oral tablet 02/04/2020 take 1 tablet (25 mg) by oral route once daily for 90 days   BD Ultra-Fine Micro Pen Needle 32 gauge x 1/4\" miscellaneous needle 02/04/2020 use as directed for 30 days qd   Januvia 100 mg oral tablet 02/04/2020 take 1 tablet (100 mg) by oral route once daily for 30 days   Jardiance 25 mg oral tablet 02/04/2020 take 1 tablet (25 mg) by oral route once daily in the morning for 90 days   Lyrica 300 mg oral capsule 02/04/2020 1 po BID   Naprosyn 500 mg oral tablet 02/04/2020 take 1 tablet (500 mg) by oral route 2 times per day with food for 30 days   pravastatin 80 mg oral tablet " "02/04/2020 take 1 tablet (80 mg) by oral route once daily at bedtime for 90 days   Tresiba FlexTouch U-100 100 unit/mL (3 mL) subcutaneous insulin pen 02/04/2020 inject 36 units subcutaneously once daily for 30 days         Allergy List  Allergen Name Date Reaction Notes   NO KNOWN DRUG ALLERGIES --  --  --        Allergies Reconciled  Social History  Finding Status Start/Stop Quantity Notes   Alcohol Former --/-- --  --    Tobacco Current every day --/-- 1.5 pk daily --          Immunizations  NameDate Admin Mfg Trade Name Lot Number Route Inj VIS Given VIS Publication   Apmyqkpuz30/05/2019 UNK Unknown TradeName [id number] NE NE 02/04/2020    Comments: [facility]   Hkyrvgqvx23/26/2018 PMC FLUARIX [id number]  10/26/2018 08/07/2015   Comments: ndc [id number]         Review of Systems  · Constitutional  o Denies  o : fatigue, fever  · Cardiovascular  o Denies  o : chest pain, palpitations  · Respiratory  o Denies  o : shortness of breath, cough  · Gastrointestinal  o Denies  o : nausea, vomiting, diarrhea  · Psychiatric  o Admits  o : depression  o Denies  o : anxiety, suicidal ideation, homicidal ideation      Vitals  Date Time BP Position Site L\R Cuff Size HR RR TEMP (F) WT  HT  BMI kg/m2 BSA m2 O2 Sat        02/04/2020 08:55 /78 Sitting    90 - R  96.9 218lbs 6oz 5'  7.5\" 33.7 2.17 97 %          Physical Examination  · Constitutional  o Appearance  o : well developed, well-nourished, in no acute distress  · Respiratory  o Respiratory Effort  o : breathing unlabored  o Auscultation of Lungs  o : clear to ascultation  · Cardiovascular  o Heart  o :   § Auscultation of Heart  § : regular rate and rhythm  o Peripheral Vascular System  o :   § Extremities  § : no edema  · Gastrointestinal  o Abdomen  o : soft, non-tender, non-distended, + bowel sounds, no hepatosplenomegaly, no masses palpated  · Musculoskeletal  o General  o :   § General Musculoskeletal  § : No joint swelling or deformity., Muscle tone, strength, " and development grossly normal.  · Skin and Subcutaneous Tissue  o General Inspection  o : small 3-5mm ulcers over body throughout  · Neurologic  o Gait and Station  o :   § Gait Screening  § : normal gait  · Psychiatric  o Mood and Affect  o : mood normal, affect appropriate  · Left DM Foot Exam  o Sensation  o : normal sensory exam perceptible to 10-gram nylon monofilament exam (5/5), vibration and light touch.  o Visual Inspection  o : visual inspection is normal with no signs of breakdown, ulcerations or deformities unless otherwise noted.   o Vascular  o : palpable dorsalis pedis and posterir tibialis pulses present, normal capillary refill  · Right DM Foot Exam  o Sensation  o : normal sensory exam perceptible to 10-gram nylon monofilament exam (5/5), vibration and light touch.  o Visual Inspection  o : visual inspection is normal with no signs of breakdown, ulcerations or deformities unless otherwise noted.   o Vascular  o : palpable dorsalis pedis and posterir tibialis pulses present, normal capillary refill          Assessment  · Diabetes mellitus, type 2     250.00/E11.9  · Hyperlipidemia     272.4/E78.5  · Depression     311/F32.9  · Screening PSA (prostate specific antigen)     V76.44/Z12.5  · Drug therapy     V58.69/Z79.899  · Impetigo     684/L01.00  · Diabetic peripheral neuropathy       Type 2 diabetes mellitus with diabetic polyneuropathy     250.60/E11.42  · Screen for colon cancer     V76.51/Z12.11      Plan  · Orders  o OPHTHALMOLOGY CONSULTATION (OPHTH) - 250.00/E11.9 - 02/04/2020  o Diabetic Foot (Motor and Sensory) Exam Completed [facility] (, , 2028F) - 250.00/E11.9 - 02/04/2020  o CBC with Auto Diff [facility] (10718) - 250.00/E11.9 - 02/04/2020  o CMP [facility] (38424) - 250.00/E11.9 - 02/04/2020  o Hgb A1c [facility] (06146) - 250.00/E11.9 - 02/04/2020  o Lipid Panel [facility] (82091) - 250.00/E11.9 - 02/04/2020  o Thyroid Profile (THYII) - 250.00/E11.9 - 02/04/2020  o Urine Drug Screen ([facility]) (42380) -  "V58.69/Z79.899 - 02/04/2020  o Urine microalbumin (67851) - 250.00/E11.9 - 02/04/2020  o ACO-14: Influenza immunization administered or previously received () - - 02/04/[facility]  o ACO-18: Positive screen for clinical depression using a standardized tool and a follow-up plan documented () - - 02/04/2020  o ACO-19: Colorectal cancer screening results documented and reviewed (3017F) - - 02/04/2020 2015  o ACO-39: Current medications updated and reviewed () - - 02/04/2020  o PSA Ultrasensitive, ANNUAL SCREENING [facility] (89180) - V76.44/Z12.5 - 02/04/2020  o Gastroenterology Consultation (GASTR) - V76.51/Z12.11 - 02/04/2020   needs screening colonoscopy-no change in stool  · Medications  o Cymbalta 30 mg oral capsule,delayed release(DR/EC)   SIG: take 1 capsule (30 mg) by oral route once daily for 30 days   DISP: (30) capsules with 2 refills  Prescribed on 02/04/2020     o Keflex 500 mg oral capsule   SIG: take 1 capsule (500 mg) by oral route every 6 hours for 7 days   DISP: (28) capsules with 0 refills  Prescribed on 02/04/2020     o mupirocin 2 % topical ointment   SIG: apply a small amount to the affected area by topical route 3 times per day for 10 days   DISP: (1) 22 gm tube with 2 refills  Prescribed on 02/04/2020     o atenolol 25 mg oral tablet   SIG: take 1 tablet (25 mg) by oral route once daily for 90 days   DISP: (90) tablet with 1 refills  Adjusted on 02/04/2020     o BD Ultra-Fine Micro Pen Needle 32 gauge x 1/4\" miscellaneous needle   SIG: use as directed for 30 days qd   DISP: (1) 100 ct box with 5 refills  Adjusted on 02/04/2020     o Januvia 100 mg oral tablet   SIG: take 1 tablet (100 mg) by oral route once daily for 30 days   DISP: (30) tablets with 5 refills  Adjusted on 02/04/2020     o Jardiance 25 mg oral tablet   SIG: take 1 tablet (25 mg) by oral route once daily in the morning for 90 days   DISP: (90) tablet with 1 refills  Adjusted on 02/04/2020     o Lyrica 300 mg oral " capsule   SI po BID   DISP: (60) capsules with 2 refills  Adjusted on 2020     o Naprosyn 500 mg oral tablet   SIG: take 1 tablet (500 mg) by oral route 2 times per day with food for 30 days   DISP: (60) tablets with 5 refills  Adjusted on 2020     o pravastatin 80 mg oral tablet   SIG: take 1 tablet (80 mg) by oral route once daily at bedtime for 90 days   DISP: (90) tablets with 5 refills  Adjusted on 2020     o Tresiba FlexTouch U-100 100 unit/mL (3 mL) subcutaneous insulin pen   SIG: inject 36 units subcutaneously once daily for 30 days   DISP: (4) Insulin pens with 5 refills  Adjusted on 2020     o Medications have been Reconciled  o Transition of Care or Provider Policy  · Instructions  o Advised that cheeses and other sources of dairy fats, animal fats, fast food, and the extras (candy, pasteries, pies, doughnuts and cookies) all contain LDL raising nutrients. Advised to increase fruits, vegetables, whole grains, and to monitor portion sizes.   o Obtained a written consent for AJ query. Discussed the risk and benefits of the use of controlled substances with the patient, including the risk of tolerance and drug dependence. The patient has been counseled on the need to have an exit strategy, including potentially discontinuing the use of controlled substances. AJ has or will be reviewed as soon as it becomes avaliable.  o Patient was educated/instructed on their diagnosis, treatment and medications prior to discharge from the clinic today.            Electronically Signed by: Robert García MD -Author on 2020 11:16:48 AM

## 2021-05-07 NOTE — PROGRESS NOTES
Progress Note      Patient Name: Zechariah Fleming Jr.   Patient ID: [id number]   Sex: Male   YOB: 1965    Primary Care Provider: Jaci Talbert MD   Referring Provider: Robert García MD    Visit Date: March 12, 2020    Provider: Robert García MD   Location: [facility]   Location Address: [address] [place] [address] [place]  [address]   Location Phone: [phone]          Chief Complaint  · Annual Wellness Exam      History Of Present Illness  The patient is a 54 year old /White male who has come to this office for his Annual Wellness Visit. His Primary Care Provider is Robert García MD. His comprehensive Care Team list, including suppliers, has been updated on the Facesheet. His medical/family history, height, weight, BMI, and blood pressure have been reviewed and are in the chart. The Health Risk Assessment has been completed and scanned in the chart.   Medications are listed in the medication list.   The Mini-Cog has been administered and is scanned in chart. The results are negative. His cognitive function is without limitation.   A hearing loss screen was completed today and the result is negative.   Patient does not have any risk factors for depression. Patient completed the PHQ-9 today and it has been scanned in the chart. The total score is TOTAL SCORE:0.   The Timed-Up-and-Go screen was administered today and the result is negative.   The Rg Index of [place] in ADLs indicated full function (score of 6).   A Falls Risk Assessment has been completed, including a review of home fall hazards and medication review.   Overall, the patient's functional ability is noted by this provider to be within normal limits. His level of safety is noted to be within normal limits. His balance/gait is within normal limits. There has been a fall without injury in the past year. Patient-specific home safety recommendations have been reviewed and a copy has been  "given to patient.   He denies issues with leaking urine.   There are no additional risk factors identified.   Living Will/Advanced Directive has not previously been completed.   Personalized health advice was given to the patient and a written health screening schedule was established; see Plan for details.   Zechariah Fleming Jr. is a 54 year old /White male who presents for evaluation and treatment of:       Past Medical History  Disease Name Date Onset Notes   Diabetes --  --    Hyperlipemia --  --    Peripheral neuropathy --  --    Reflux --  --          Past Surgical History  Procedure Name Date Notes   Shoulder surgery --  --          Medication List  Name Date Started Instructions   atenolol 25 mg oral tablet 02/04/2020 take 1 tablet (25 mg) by oral route once daily for 90 days   BD Ultra-Fine Micro Pen Needle 32 gauge x 1/4\" miscellaneous needle 02/04/2020 use as directed for 30 days qd   Cymbalta 30 mg oral capsule,delayed release(DR/EC) 02/04/2020 take 1 capsule (30 mg) by oral route once daily for 30 days   Januvia 100 mg oral tablet 02/04/2020 take 1 tablet (100 mg) by oral route once daily for 30 days   Jardiance 25 mg oral tablet 02/04/2020 take 1 tablet (25 mg) by oral route once daily in the morning for 90 days   Lyrica 300 mg oral capsule 02/04/2020 1 po BID   mupirocin 2 % topical ointment 02/04/2020 apply a small amount to the affected area by topical route 3 times per day for 10 days   Naprosyn 500 mg oral tablet 02/04/2020 take 1 tablet (500 mg) by oral route 2 times per day with food for 30 days   pravastatin 80 mg oral tablet 02/04/2020 take 1 tablet (80 mg) by oral route once daily at bedtime for 90 days   Tresiba FlexTouch U-100 100 unit/mL (3 mL) subcutaneous insulin pen 02/04/2020 inject 36 units subcutaneously once daily for 30 days         Allergy List  Allergen Name Date Reaction Notes   NO KNOWN DRUG ALLERGIES --  --  --          Social History  Finding Status Start/Stop " Quantity Notes   Alcohol Former --/-- --  --    Tobacco Current every day --/-- 1.5 pk daily --          Immunizations  NameDate Admin Mfg Trade Name Lot Number Route Inj VIS Given VIS Publication   Dpiltrrcg99/05/2019 UNK Unknown TradeName [id number] NE NE 02/04/2020    Comments: [facility]   Rjosmgtov64/26/2018 PMC FLUARIX [id number]  10/26/2018 08/07/2015   Comments: ndc [id number]         Review of Systems  · Constitutional  o Denies  o : fatigue, fever  · Cardiovascular  o Denies  o : chest pain, palpitations  · Respiratory  o Denies  o : shortness of breath, cough  · Gastrointestinal  o Denies  o : nausea, vomiting, diarrhea  · Psychiatric  o Denies  o : anxiety, depression      Vitals  Date Time BP Position Site L\R Cuff Size HR RR TEMP (F) WT  HT  BMI kg/m2 BSA m2 O2 Sat        03/12/2020 08:20 /80 Sitting    103 - R  96 217lbs 0oz    94 %          Physical Examination  · Respiratory  o Respiratory Effort  o : breathing unlabored  o Auscultation of Lungs  o : clear to ascultation  · Cardiovascular  o Heart  o :   § Auscultation of Heart  § : regular rate and rhythm  · Gastrointestinal  o Abdomen  o : soft, non-tender, non-distended, + bowel sounds, no hepatosplenomegaly, no masses palpated  · Musculoskeletal  o General  o :   § General Musculoskeletal  § : No joint swelling or deformity., Muscle tone, strength, and development grossly normal.  · Neurologic  o Mental Status Examination  o :   § Orientation  § : grossly oriented to person, place and time  o Gait and Station  o :   § Gait Screening  § : normal gait              Assessment  · Screening for alcoholism     V79.1/Z13.89  · Screening for depression     V79.0/Z13.89  · Encounter for Medicare annual wellness exam     V70.0/Z00.00  · Advanced care planning/counseling discussion     V65.49/Z71.89  · Diabetes mellitus, type 2     250.00/E11.9  · Leukocytosis     288.60/D72.829  · Erectile dysfunction     607.84/N52.9      Plan  · Orders  o Negative  alcohol screening () - V79.1/Z13.89 - 03/12/2020  o ACO-18: Negative screen for clinical depression using a standardized tool () - V79.0/Z13.89 - 03/12/2020  o Falls Risk Assessment Completed (3288F) - V70.0/Z00.00 - 03/12/2020  o Brief hearing screening (written) [facility] () - V70.0/Z00.00 - 03/12/2020  o Annual wellness visit; includes a personalized prevention plan of service (pps), initial visit () - V70.0/Z00.00 - 03/12/2020  o ACO-13: Fall Risk Screening with no falls in past year or only one fall without injury in the past year (1101F) - V70.0/Z00.00 - 03/12/2020  o Presence or absence of urinary incontinence assessed (OZZIE) (1090F) - V70.0/Z00.00 - 03/12/2020  o ACO-39: Current medications updated and reviewed () - - 03/12/2020  o ACO - Pt declines to or was not able to provide an Advance Care Plan or name a Surrogate Decision Maker (1124F) - - 03/12/2020  o ENDOCRINOLOGY CONSULTATION (ENDOC) - 250.00/E11.9 - 03/12/2020  o HEMATOLOGY/ONCOLOGY CONSULTATION (HEMOC) - 288.60/D72.829 - 03/12/2020  · Medications  o Viagra 100 mg oral tablet   SIG: take 1 tablet (100 mg) by oral route once daily as needed approximately 1 hour before sexual activity for 30 days   DISP: (12) tablets with 5 refills  Prescribed on 03/12/2020     · Instructions  o Audit-C Questionnaire completed and scanned into the EMR under the designated folder within the patient's documents.  o Audit-C score of 0-4 - Negative Screen - Brief Discussion  o Depression Screen completed and scanned into the EMR under the designated folder within the patient's documents.  o PHQ-9 result zero indicates no risk for Depression  o Health Risk Assessment has been reviewed with the patient.  o Written health screening schedule for next 5-10 years was established with patient; information scanned in chart and given to patient.  o Fall prevention methods discussed and a copy of recommendations given to patient.  o Face-to-face Advanced Care  Planning discussed for a minimum of 16 minutes.  o Patient was educated/instructed on their diagnosis, treatment and medications prior to discharge from the clinic today.            Electronically Signed by: Robert García MD -Author on March 12, 2020 09:10:15 AM

## 2021-05-07 NOTE — PROGRESS NOTES
"   Progress Note      Patient Name: Zechariah Fleming   Patient ID: [id number]   Sex: Male   YOB: 1965    Primary Care Provider: Jaci Talbert MD   Referring Provider: Jaci Talbert MD    Visit Date: May 6, 2019    Provider: Jaci Talbert MD   Location: [facility]   Location Address: [address] [place] [address]  [id number]   Location Phone: [phone]          Chief Complaint     Left foot feels swelled up but isn't, burning, feels different hot and warm X 2 weeks       History Of Present Illness  Zechariah Fleming is a 53 year old /White male who presents for evaluation and treatment of:      He has a strange feeling that the top of the R foot has gel on it.  Sometimes it pulsates and it has a numb feeling much worse than on the L foot.  He takes Lyrica 150 TID.  We will change to 2 tablets BID.  The last time I had a hard time getting his pulses with the Doppler.  When he touches his foot it isn't hot or red.  He is wearing diabetic socks.  He has about 70 day supply of Lyrica  He didn't go for the CT of the chest.  If he has something wrong he doesn't want to know.  He doesn't plan to quit smoking for anyone.       Past Medical History  Disease Name Date Onset Notes   Diabetes --  --    Hyperlipemia --  --    Peripheral neuropathy --  --    Reflux --  --          Past Surgical History  Procedure Name Date Notes   Shoulder surgery --  --          Medication List  Name Date Started Instructions   atenolol 25 mg oral tablet 03/01/2019 take 1 tablet (25 mg) by oral route once daily for 90 days   BD Ultra-Fine Micro Pen Needle 32 gauge x 1/4\" miscellaneous needle 03/01/2019 use as directed for 30 days qd   Januvia 100 mg oral tablet 03/01/2019 take 1 tablet (100 mg) by oral route once daily for 30 days   Jardiance 25 mg oral tablet 03/01/2019 take 1 tablet (25 mg) by oral route once daily in the morning for 90 days   Lyrica 150 mg oral capsule " 03/01/2019 take 1 capsule (100 mg) by oral route 3 times per day for 30 days   Naprosyn 500 mg oral tablet 03/01/2019 take 1 tablet (500 mg) by oral route 2 times per day with food for 30 days   omeprazole 20 mg oral capsule,delayed release(DR/EC)  --    pravastatin 80 mg oral tablet 03/04/2019 take 1 tablet (80 mg) by oral route once daily at bedtime for 90 days   Tresiba FlexTouch U-100 100 unit/mL (3 mL) subcutaneous insulin pen 03/01/2019 inject 36 units subcutaneously once daily for 30 days   Vitamin D2 50,000 unit oral capsule 03/04/2019 take 1 capsule (50,000 unit) by oral route once weekly for 90 days         Allergy List  Allergen Name Date Reaction Notes   NO KNOWN DRUG ALLERGIES --  --  --          Social History  Finding Status Start/Stop Quantity Notes   Alcohol Former --/-- --  --    Tobacco Current every day --/-- --  --          Immunizations  NameDate Admin Mfg Trade Name Lot Number Route Inj VIS Given VIS Publication   Vwwjipail49/26/2018 PMC FLUARIX [id number]  10/26/2018 08/07/2015   Comments: ndc [id number]         Vitals  Date Time BP Position Site L\R Cuff Size HR RR TEMP (F) WT  HT  BMI kg/m2 BSA m2 O2 Sat        05/06/2019 08:04 /72 Sitting    76 - R  97.9 200lbs 6oz    97 %          Physical Examination  · Constitutional  o Appearance  o : well developed, well-nourished, in no acute distress  · Eyes  o Conjunctivae  o : conjunctivae normal  · Neck  o Thyroid  o : no thyromegaly  · Respiratory  o Respiratory Effort  o : breathing unlabored  o Auscultation of Lungs  o : clear to ascultation Has to cough to clear   · Cardiovascular  o Heart  o :   § Auscultation of Heart  § : regular rate and rhythm  o Peripheral Vascular System  o :   § Extremities  § : no edema  · Musculoskeletal  o General  o :   § General Musculoskeletal  § : Muscle tone, strength, and development grossly normal.  · Skin and Subcutaneous Tissue  o General Inspection  o : normal  · Neurologic  o Gait and  Station  o :   § Gait Screening  § : normal gait  · Psychiatric  o Mood and Affect  o : mood normal, affect appropriate  · Left DM Foot Exam  o Sensation  o : His sensation is decreased  o Visual Inspection  o : foot isn't hot and not tender It is not swollen          Assessment  · Diabetic neuropathy       Type 2 diabetes mellitus with diabetic neuropathy, unspecified     250.60/E11.40      Plan  · Orders  o ACO-39: Current medications updated and reviewed () - - 2019  · Medications  o Lyrica 300 mg oral capsule   SI po BID   DISP: (60) capsules with 5 refills  Adjusted on 2019     · Instructions  o Patient was educated/instructed on their diagnosis, treatment and medications prior to discharge from the clinic today.            Electronically Signed by: Jaci Talbert MD -Author on May 6, 2019 08:39:42 AM

## 2021-05-07 NOTE — PROGRESS NOTES
"   Progress Note      Patient Name: Zechariah Fleming   Patient ID: [id number]   Sex: Male   YOB: 1965        Visit Date: October 26, 2018    Provider: Jaci Talbert MD   Location: [facility]   Location Address: [address] [place] [address]  [id number]   Location Phone: [phone]          Chief Complaint     Lab work and refill Tresiba       History Of Present Illness  Zechariah Fleming is a 53 year old /White male who presents for evaluation and treatment of:      He would be interested in the meter that doesn't require sticking himself.  He stayed on Tresiba 36 units.  He needs samples.  He got tired of sticking himself.  He will take the flu vaccine and next time we will do a pneumonia vaccine.  He did not use the smoking cessation tools but he is working on cutting down on his cigarettes.       Past Medical History  Disease Name Date Onset Notes   Diabetes --  --    Hyperlipemia --  --    Peripheral neuropathy --  --    Reflux --  --          Past Surgical History  Procedure Name Date Notes   Shoulder surgery --  --          Medication List  Name Date Started Instructions   atenolol 25 mg oral tablet 08/07/2018 take 1 tablet (25 mg) by oral route once daily for 90 days   BD Ultra-Fine Micro Pen Needle 32 gauge x 1/4\" miscellaneous needle 08/07/2018 use as directed for 30 days qd   BD Ultra-Fine Mariza Pen Needle 32 gauge x 5/32\" miscellaneous needle 08/13/2018 use as directed for 30 days   Hyoscyamine 0.125 MG oral tablet 11/21/2017 dissolve 1 tablet under tongue 3 times a day as needed   Januvia 100 mg oral tablet 08/07/2018 take 1 tablet (100 mg) by oral route once daily for 30 days   Jardiance 25 mg oral tablet 08/07/2018 take 1 tablet (25 mg) by oral route once daily in the morning for 90 days   loratadine 10 mg oral tablet 07/20/2018 take 1 tablet (10 mg) by oral route once daily for 90 days   Lyrica 100 mg oral capsule 08/07/2018 take 1 capsule by oral " route 2 times a day for 30 days   Naprosyn 500 mg oral tablet 12/22/2017 take 1 tablet (500 mg) by oral route 2 times per day with food for 30 days   omeprazole 20 mg oral capsule,delayed release(DR/EC)  --    pravastatin 40 mg oral tablet 08/07/2018 take 1 tablet (40 mg) by oral route once daily at bedtime for 90 days   Tresiba FlexTouch U-100 100 unit/mL (3 mL) subcutaneous insulin pen 08/07/2018 inject 36 units subcutaneously once daily for 30 days   Vitamin D2 50,000 unit oral capsule 08/13/2018 take 1 capsule (50,000 unit) by oral route once weekly for 90 days         Social History  Finding Status Start/Stop Quantity Notes   Alcohol Former --/-- --  --    Tobacco Current every day --/-- --  --          Vitals  Date Time BP Position Site L\R Cuff Size HR RR TEMP(F) WT  HT  BMI kg/m2 BSA m2 O2 Sat HC       10/26/2018 09:12 /68 Sitting    83 - R  97.8 204lbs 0oz    97 %           Physical Examination  · Constitutional  o Appearance  o : well developed, well-nourished, in no acute distress  · Eyes  o Conjunctivae  o : conjunctivae normal  · Neck  o Inspection/Palpation  o : supple  o Thyroid  o : no thyromegaly  · Respiratory  o Respiratory Effort  o : breathing unlabored  o Auscultation of Lungs  o : clear to ascultation  · Cardiovascular  o Heart  o :   § Auscultation of Heart  § : regular rate and rhythm  o Peripheral Vascular System  o :   § Extremities  § : no edema  · Lymphatic  o Neck  o : no lymphadenopathy present  · Musculoskeletal  o General  o :   § General Musculoskeletal  § : No joint swelling or deformity., Muscle tone, strength, and development grossly normal.  · Skin and Subcutaneous Tissue  o General Inspection  o : no lesions present, no areas of discoloration, skin turgor normal, texture normal  · Neurologic  o Gait and Station  o :   § Gait Screening  § : normal gait  · Psychiatric  o Mood and Affect  o : mood normal, affect appropriate          Assessment  · Need for influenza  vaccination     V04.81/Z23  · Diabetes mellitus, type 2     250.00/E11.9      Plan  · Orders  o Hgb A1c [facility] (40969) - 250.00/E11.9 - 10/26/2018  o ACO-13: Fall Risk Screening with no falls in past year or only one fall without injury in the past year (1101F) - - 10/26/2018  o ACO-14: Influenza immunization administered or previously received () - V04.81/Z23 - 10/26/2018  o ACO-39: Current medications updated and reviewed () - - 10/26/2018  o Fluzone Quadrivalent Vaccine, age 3+ (62993) - V04.81/Z23 - 10/26/2018   Vaccine - Influenza; Dose: 0.5; Site: Left Upper Arm; Route: Intramuscular; Date: 10/26/2018 09:46:00; Exp: 06/30/2019; Lot: [id number]; Mfg: sanofi pasteur; TradeName: Fluzone Quadrivalent; Administered By: Coby Chadwick MA; Comment: ndc [phone]  · Instructions  o Patient was educated/instructed on their diagnosis, treatment and medications prior to discharge from the clinic today.            Electronically Signed by: Jaci Talbert MD -Author on October 26, 2018 10:48:04 AM

## 2021-05-07 NOTE — PROGRESS NOTES
"   Progress Note      Patient Name: Zechariah Fleming   Patient ID: [id number]   Sex: Male   YOB: 1965        Visit Date: August 16, 2018    Provider: BIENVENIDO Cardenas   Location: [facility]   Location Address: [address] [place] [address]  [id number]   Location Phone: [phone]          Chief Complaint     RT ear ache       History Of Present Illness  Zechariah Fleming is a 53 year old /White male who presents for evaluation and treatment of:      right ear pain--several days--sinus pain/pressure on the right--he has had some congestion and some nasal discharge as well. No coughing, wheezing, or shortness of breath.     He denies any abscessed teeth currently, but reports a history of bad teeth--he grinds his teeth as well.       Past Medical History  Disease Name Date Onset Notes   Diabetes --  --    Hyperlipemia --  --    Peripheral neuropathy --  --    Reflux --  --          Past Surgical History  Procedure Name Date Notes   Shoulder surgery --  --          Medication List  Name Date Started Instructions   atenolol 25 mg oral tablet 08/07/2018 take 1 tablet (25 mg) by oral route once daily for 90 days   BD Ultra-Fine Micro Pen Needle 32 gauge x 1/4\" miscellaneous needle 08/07/2018 use as directed for 30 days qd   BD Ultra-Fine Mariza Pen Needle 32 gauge x 5/32\" miscellaneous needle 08/13/2018 use as directed for 30 days   Hyoscyamine 0.125 MG oral tablet 11/21/2017 dissolve 1 tablet under tongue 3 times a day as needed   Januvia 100 mg oral tablet 08/07/2018 take 1 tablet (100 mg) by oral route once daily for 30 days   Jardiance 25 mg oral tablet 08/07/2018 take 1 tablet (25 mg) by oral route once daily in the morning for 90 days   loratadine 10 mg oral tablet 07/20/2018 take 1 tablet (10 mg) by oral route once daily for 90 days   Lyrica 100 mg oral capsule 08/07/2018 take 1 capsule by oral route 2 times a day for 30 days   Naprosyn 500 mg oral tablet " 12/22/2017 take 1 tablet (500 mg) by oral route 2 times per day with food for 30 days   omeprazole 20 mg oral capsule,delayed release(DR/EC)  --    pravastatin 40 mg oral tablet 08/07/2018 take 1 tablet (40 mg) by oral route once daily at bedtime for 90 days   Tresiba FlexTouch U-100 100 unit/mL (3 mL) subcutaneous insulin pen 08/07/2018 inject 36 units subcutaneously once daily for 30 days   Vitamin D2 50,000 unit oral capsule 08/13/2018 take 1 capsule (50,000 unit) by oral route once weekly for 90 days         Social History  Finding Status Start/Stop Quantity Notes   Alcohol Former --/-- --  --    Tobacco Current every day --/-- --  --          Review of Systems  · Constitutional  o Denies  o : fatigue, fever, chills  · Eyes  o Denies  o : discharge from eye, double vision, blurred vision  · HENT  o Admits  o : headaches, sinus pain, nasal congestion, nasal discharge, ear pain  o Denies  o : recent head injury, dental problems, sore throat, tinnitus, ear fullness  · Cardiovascular  o Denies  o : chest pain, palpitations  · Respiratory  o Denies  o : shortness of breath, wheezing, cough  · Gastrointestinal  o Denies  o : nausea, vomiting, abdominal pain  · Integument  o Denies  o : pigmentation changes      Vitals  Date Time BP Position Site L\R Cuff Size HR RR TEMP(F) WT  HT  BMI kg/m2 BSA m2 O2 Sat HC       08/16/2018 01:35 /80 Sitting    106 - R  96.7 210lbs 0oz    96 %           Physical Examination  · Constitutional  o Appearance  o : well developed, well-nourished, in no acute distress  · Head and Face  o HEENT  o : clicking of the right jaw with opening and closing  · Eyes  o Conjunctivae  o : conjunctivae normal, no exudates present  o Pupils and Irises  o : pupils equal and round, pupils reactive to light bilaterally  · Ears, Nose, Mouth and Throat  o Ears  o :   § External Ears  § : auricle appearance normal bilaterally, no auricle tenderness to palpation present, external auditory canal appearance  within normal limits  § Otoscopic Examination  § : tympanic membrane appearance within normal limits bilaterally  § Hearing  § : response to sound normal, no tinnitus  o Nose  o :   § External Nose  § : appearance normal  § Intranasal Exam  § : mucosa within normal limits, sinuses non tender to percussion  o Oral Cavity  o :   § Oral Mucosa  § : oral mucosa normal, foul breath odor present   § Lips  § : lip appearance normal  § Teeth  § : poor dentition   § Gums  § : gums pink, non-swollen, no bleeding present  o Throat  o :   § Oropharynx  § : no inflammation or lesions present, tonsils within normal limits  · Neck  o Inspection/Palpation  o : supple  · Respiratory  o Respiratory Effort  o : breathing unlabored  o Auscultation of Lungs  o : clear to ascultation  · Cardiovascular  o Heart  o :   § Auscultation of Heart  § : regular rate and rhythm  o Peripheral Vascular System  o :   § Extremities  § : no edema  · Gastrointestinal  o Abdominal Examination  o :   § Abdomen  § : soft, non-tender, non-distended, bowel sounds +  · Lymphatic  o Neck  o : no lymphadenopathy present  · Musculoskeletal  o General  o :   § General Musculoskeletal  § : No joint swelling or deformity. Muscle tone, strength, and development grossly normal.  · Skin and Subcutaneous Tissue  o General Inspection  o : no lesions present, no areas of discoloration, skin turgor normal, texture normal  · Neurologic  o Gait and Station  o :   § Gait Screening  § : normal gait  · Psychiatric  o Mood and Affect  o : mood normal, affect appropriate          Assessment  · Sinusitis, acute     461.9/J01.90  · Otalgia of right ear     388.70/H92.01      Plan  · Orders  o ACO-39: Current medications updated and reviewed () - - 08/16/2018  o ENT CONSULTATION (ENTCO) - 388.70/H92.01 - 08/16/2018   eval for otalgia--possible TMJ  · Medications  o Augmentin 875-125 mg oral tablet   SIG: take 1 tablet by oral route every 12 hours for 10 days   DISP: (20)  tablets with 0 refills  Prescribed on 08/16/2018     · Instructions  o Patient was educated/instructed on their diagnosis, treatment and medications prior to discharge from the clinic today.            Electronically Signed by: BIENVENIDO Cardenas -Author on August 16, 2018 02:21:26 PM

## 2021-05-07 NOTE — PROGRESS NOTES
Progress Note      Patient Name: Zechariah Fleming   Patient ID: [id number]   Sex: Male   YOB: 1965        Visit Date: February 14, 2018    Provider: Jaci Talbert MD   Location: [facility]   Location Address: [address] [place] [address]  [id number]   Location Phone: [phone]          Chief Complaint     refills and follow up on labwork       History Of Present Illness  Zechariah Fleming is a 52 year old /White male who presents for evaluation and treatment of:      He's been hot and cold and he has been bringing up a little yellow phlegm.  Besides that , he feels real good.  He has taken all his shots.  He needs more needles.  He has quit Cokes.  He feels better.  He needs a referral to go back to the podiatrist.  They do his toenails.  He stopped the Prilosec and the Metformin and he has not had anymore diarrhea.    It sounde like he was off the Pravastatin too and he will restart it.       Past Medical History  Disease Name Date Onset Notes   Diabetes --  --    Hyperlipemia --  --    Reflux --  --          Past Surgical History  Procedure Name Date Notes   Shoulder surgery --  --          Medication List  Name Date Started Instructions   atenolol 25 mg oral tablet 11/21/2017 take 1 tablet (25 mg) by oral route once daily for 90 days   Hyoscyamine 0.125 MG oral tablet 11/21/2017 dissolve 1 tablet under tongue 3 times a day as needed   Januvia 100 mg oral tablet 12/22/2017 take 1 tablet (100 mg) by oral route once daily for 30 days   Jardiance 25 mg oral tablet 11/21/2017 take 1 tablet (25 mg) by oral route once daily in the morning for 90 days   loratadine 10 mg oral tablet 11/21/2017 take 1 tablet (10 mg) by oral route once daily for 90 days   Lyrica 100 mg oral capsule 11/21/2017 take 1 capsule (100 mg) by oral route 3 times per day for 30 days   Lyrica 300 mg oral capsule 11/21/2017 take 1 capsule (300 mg) by oral route 2 times per day for 30 days  "  Naprosyn 500 mg oral tablet 12/22/2017 take 1 tablet (500 mg) by oral route 2 times per day with food for 30 days   omeprazole 20 mg oral capsule,delayed release(DR/EC)  --    pravastatin 40 mg oral tablet  take 1 tablet (40 mg) by oral route once daily at bedtime   Tresiba FlexTouch U-100 100 unit/mL (3 mL) subcutaneous insulin pen  inject 22 units by subcutaneous route daily         Social History  Finding Status Start/Stop Quantity Notes   Alcohol Former --/-- --  --    Tobacco Current every day --/-- --  --          Vitals  Date Time BP Position Site L\R Cuff Size HR RR TEMP(F) WT  HT  BMI kg/m2 BSA m2 O2 Sat HC       02/14/2018 08:08 /80 Sitting    86 - R  96.6 211lbs 6oz 5'  7.5\" 32.62 2.14 97 %           Physical Examination     NAD  Slight cough  Both TM's are pink and pushing outward  Throat clear No lymphadenopathy  Lungs clear  RRR no murmur             Assessment  · Diabetes mellitus, type 2     250.00/E11.9  · Otitis media     382.9/H66.90      Plan  · Orders  o Hgb A1c [facility] ([address]) - 250.00/E11.9 - 02/14/2018  o PODIATRY CONSULTATION (PODIA) - 250.00/E11.9 - 02/14/2018  o ACO-39: Current medications updated and reviewed () - - 02/14/2018  o 12.00 - Bicillin Injection 1.2 Million Units (-7) - - 02/14/2018   Injection - Bicillin LA 1.2 mil; Dose: 2mL; Site: Right Gluteus; Route: intramuscular; Date: 02/14/2018 08:48:57; Exp: 06/01/2020; Lot: [id number]; Mfg: Bioxodes; TradeName: penicillin G benzathine; Location: [facility]; Administered By: Evan aCse MA; Comment: NDC [id number]  · Medications  o BD Ultra-Fine Mariza Pen Needles 32 gauge x 5/32\" miscellaneous needle   SIG: use as directed for 30 days   DISP: (30) 10 ct box with 5 refills  Prescribed on 02/14/2018     o pravastatin 40 mg oral tablet   SIG: take 1 tablet (40 mg) by oral route once daily at bedtime for 30 days   DISP: (30) tablet with 5 refills  Prescribed on 02/14/2018     o Tresiba FlexTouch U-100 " 100 unit/mL (3 mL) subcutaneous insulin pen   SIG: inject 25 units by subcutaneous route daily for 30 days   DISP: (1) 3 ml syringe with 2 refills  Adjusted on 02/14/2018     · Instructions  o Patient was educated/instructed on their diagnosis, treatment and medications prior to discharge from the clinic today.            Electronically Signed by: Jaci Talbert MD -Author on February 14, 2018 09:22:20 AM

## 2021-05-07 NOTE — PROGRESS NOTES
"   Progress Note      Patient Name: Zechariah Fleming   Patient ID: [id number]   Sex: Male   YOB: 1965        Visit Date: March 1, 2019    Provider: Jaci Talbert MD   Location: [facility]   Location Address: [address] [place] [address]  [id number]   Location Phone: [phone]          Chief Complaint     Refill all meds and lab work       History Of Present Illness  Zechariah Fleming is a 53 year old /White male who presents for evaluation and treatment of:      He has lost weight since he lost his teeth.  He has to buy the dentures and has an appointment next week.  He has not gotten hypoglycemic spells.  He has been out of medications for 1 week.  He wants to get his feet checked so that he can get diabetic shoes again.  He has never had the CT of his lungs.  He has been a smoker for over 30 pack years.  He continues to smoke  He had the flu vaccine within the past year and did not get the flu although the rest of the family had the flu.       Past Medical History  Disease Name Date Onset Notes   Diabetes --  --    Hyperlipemia --  --    Peripheral neuropathy --  --    Reflux --  --          Past Surgical History  Procedure Name Date Notes   Shoulder surgery --  --          Medication List  Name Date Started Instructions   atenolol 25 mg oral tablet 08/07/2018 take 1 tablet (25 mg) by oral route once daily for 90 days   BD Ultra-Fine Micro Pen Needle 32 gauge x 1/4\" miscellaneous needle 08/07/2018 use as directed for 30 days qd   BD Ultra-Fine Mariza Pen Needle 32 gauge x 5/32\" miscellaneous needle 08/13/2018 use as directed for 30 days   Hyoscyamine 0.125 MG oral tablet 11/21/2017 dissolve 1 tablet under tongue 3 times a day as needed   Januvia 100 mg oral tablet 08/07/2018 take 1 tablet (100 mg) by oral route once daily for 30 days   Jardiance 25 mg oral tablet 08/07/2018 take 1 tablet (25 mg) by oral route once daily in the morning for 90 days   loratadine " 10 mg oral tablet 07/20/2018 take 1 tablet (10 mg) by oral route once daily for 90 days   Lyrica 100 mg oral capsule 08/07/2018 take 1 capsule by oral route 2 times a day for 30 days   Lyrica 150 mg oral capsule 11/26/2018 take 1 capsule (100 mg) by oral route 3 times per day for 30 days   Naprosyn 500 mg oral tablet 12/22/2017 take 1 tablet (500 mg) by oral route 2 times per day with food for 30 days   omeprazole 20 mg oral capsule,delayed release(DR/EC)  --    pravastatin 40 mg oral tablet 08/07/2018 take 1 tablet (40 mg) by oral route once daily at bedtime for 90 days   Tresiba FlexTouch U-100 100 unit/mL (3 mL) subcutaneous insulin pen 08/07/2018 inject 36 units subcutaneously once daily for 30 days   Vitamin D2 50,000 unit oral capsule 08/13/2018 take 1 capsule (50,000 unit) by oral route once weekly for 90 days         Social History  Finding Status Start/Stop Quantity Notes   Alcohol Former --/-- --  --    Tobacco Current every day --/-- --  --          Immunizations  NameDate Admin Mfg Trade Name Lot Number Route Inj VIS Given VIS Publication   [id number]/26/2018 [facility] Fluzone Quadrivalent [id number]  10/26/2018 08/07/2015   Comments: ndc [id number]         Vitals  Date Time BP Position Site L\R Cuff Size HR RR TEMP(F) WT  HT  BMI kg/m2 BSA m2 O2 Sat        03/01/2019 08:25 /60 Sitting    74 - R  97.4 195lbs 0oz    97 %           Physical Examination  · Constitutional  o Appearance  o : well developed, well-nourished, in no acute distress  · Head and Face  o HEENT  o : slightly congested edentulous  · Eyes  o Conjunctivae  o : conjunctivae normal  · Neck  o Inspection/Palpation  o : supple  o Thyroid  o : no thyromegaly  · Respiratory  o Respiratory Effort  o : breathing unlabored  o Auscultation of Lungs  o : there is a wheeze laterally on the Rposterior lung more than on the L   · Cardiovascular  o Heart  o :   § Auscultation of Heart  § : regular rate and rhythm  o Peripheral Vascular System  o :  "  § Extremities  § : no edema  · Lymphatic  o Neck  o : no lymphadenopathy present  · Musculoskeletal  o General  o :   § General Musculoskeletal  § : grossly normal.  · Skin and Subcutaneous Tissue  o General Inspection  o : normal  · Neurologic  o Gait and Station  o :   § Gait Screening  § : normal gait  · Psychiatric  o Mood and Affect  o : mood normal, affect appropriate  · Left DM Foot Exam  o Sensation  o : slightly decreased  o Visual Inspection  o : normal  o Vascular  o : weak with the Doppler  · Right DM Foot Exam  o Sensation  o : slight decreased  o Visual Inspection  o : normal  o Vascular  o : decreased pulse          Assessment  · Diabetes mellitus, type 2     250.00/E11.9  · Essential hypertension     401.9/I10  · Hyperlipidemia     272.4/E78.5  · Nicotine dependence     305.1/F17.200  · Vitamin D deficiency     268.9/E55.9      Plan  · Orders  o Diabetic Foot (Motor and Sensory) Exam Completed [facility] (, , 2028F) - 250.00/E11.9 - 03/01/2019  o Vitamin D Level (47377) - 268.9/E55.9 - 03/01/2019  o CBC with Auto Diff [facility] (44855) - - 03/01/2019  o CMP [facility] (69879) - 250.00/E11.9, 401.9/I10, 272.4/E78.5 - 03/01/2019  o Hgb A1c [facility] (79398) - 250.00/E11.9 - 03/01/2019  o Lipid Panel [facility] (13729) - 250.00/E11.9, 401.9/I10, 272.4/E78.5 - 03/01/2019  o Urine microalbumin (48736) - 250.00/E11.9 - 03/01/2019  o Vitamin D (25-Hydroxy) Level (59956) - 268.9/E55.9 - 03/01/2019  o ACO-39: Current medications updated and reviewed () - - 03/01/2019  o Low dose CT scan (LDCT) for lung cancer screening [facility] () - - 03/01/2019  · Medications  o atenolol 25 mg oral tablet   SIG: take 1 tablet (25 mg) by oral route once daily for 90 days   DISP: (90) tablet with 1 refills  Adjusted on 03/01/2019     o BD Ultra-Fine Micro Pen Needle 32 gauge x 1/4\" miscellaneous needle   SIG: use as directed for 30 days qd   DISP: (1) 100 ct box with 5 refills  Adjusted on 03/01/2019     o Januvia 100 mg oral tablet   SIG: " "take 1 tablet (100 mg) by oral route once daily for 30 days   DISP: (30) tablets with 5 refills  Adjusted on 03/01/2019     o Jardiance 25 mg oral tablet   SIG: take 1 tablet (25 mg) by oral route once daily in the morning for 90 days   DISP: (90) tablet with 1 refills  Adjusted on 03/01/2019     o Lyrica 150 mg oral capsule   SIG: take 1 capsule (100 mg) by oral route 3 times per day for 30 days   DISP: (60) Capsule with 5 refills  Adjusted on 03/01/2019     o Naprosyn 500 mg oral tablet   SIG: take 1 tablet (500 mg) by oral route 2 times per day with food for 30 days   DISP: (60) tablets with 5 refills  Adjusted on 03/01/2019     o pravastatin 40 mg oral tablet   SIG: take 1 tablet (40 mg) by oral route once daily at bedtime for 90 days   DISP: (90) tablet with 5 refills  Adjusted on 03/01/2019     o Tresiba FlexTouch U-100 100 unit/mL (3 mL) subcutaneous insulin pen   SIG: inject 36 units subcutaneously once daily for 30 days   DISP: (4) Insulin pens with 5 refills  Adjusted on 03/01/2019     o BD Ultra-Fine Mariza Pen Needle 32 gauge x 5/32\" miscellaneous needle   SIG: use as directed for 30 days   DISP: (30) 10 ct box with 5 refills  Discontinued on 03/01/2019     o Hyoscyamine 0.125 MG oral tablet   SIG: dissolve 1 tablet under tongue 3 times a day as needed   DISP: (270) tablets with 1 refills  Discontinued on 03/01/2019     o loratadine 10 mg oral tablet   SIG: take 1 tablet (10 mg) by oral route once daily for 90 days   DISP: (90) Tablet with 1 refills  Discontinued on 03/01/2019     o Lyrica 100 mg oral capsule   SIG: take 1 capsule by oral route 2 times a day for 30 days   DISP: (60) capsules with 5 refills  Discontinued on 03/01/2019     · Instructions  o Advised that cheeses and other sources of dairy fats, animal fats, fast food, and the extras (candy, pasteries, pies, doughnuts and cookies) all contain LDL raising nutrients. Advised to increase fruits, vegetables, whole grains, and to monitor portion " sizes.   o *Form of nicotine being used:   o Patient was strongly encouraged to discontinue use of any nicotine containing product or minimize the use of the product.  o Patient was educated/instructed on their diagnosis, treatment and medications prior to discharge from the clinic today.            Electronically Signed by: Jaci Talbert MD -Author on March 1, 2019 09:35:08 AM

## 2021-05-07 NOTE — PROGRESS NOTES
"   Progress Note      Patient Name: Zechariah Fleming Jr.   Patient ID: [id number]   Sex: Male   YOB: 1965    Primary Care Provider: Jaci Talbert MD   Referring Provider: Jaci Talbert MD    Visit Date: September 23, 2019    Provider: Jaci Talbert MD   Location: [facility]   Location Address: [address] [place] [address]   [place]  [address]   Location Phone: [phone]          Chief Complaint     refills and labs, patient is fasting       History Of Present Illness  Zechariah Fleming Jr. is a 54 year old /White male who presents for evaluation and treatment of:      He wants his ears checked because it feels like there is some bugs in his canal.  It may be a hair that is bothering him.  It is a little red.  He doesn't check his sugar.  He feels good and doesn't have symptoms of low sugars.  He has peripheral neuropathy.  That is what caused him to come to the doctor in the first place.  .  He hasn't been to the eye doctor.  He usually goes to Dr. Paez.  Money is an issue.       Past Medical History  Disease Name Date Onset Notes   Diabetes --  --    Hyperlipemia --  --    Peripheral neuropathy --  --    Reflux --  --          Past Surgical History  Procedure Name Date Notes   Shoulder surgery --  --          Medication List  Name Date Started Instructions   atenolol 25 mg oral tablet 07/17/2019 take 1 tablet (25 mg) by oral route once daily for 90 days   BD Ultra-Fine Micro Pen Needle 32 gauge x 1/4\" miscellaneous needle 07/17/2019 use as directed for 30 days qd   hydrocodone-acetaminophen 5-325 mg oral tablet 07/17/2019 take 1 tablet by oral route once a day (in the evening) for 30 days   Januvia 100 mg oral tablet 07/17/2019 take 1 tablet (100 mg) by oral route once daily for 30 days   Jardiance 25 mg oral tablet 07/17/2019 take 1 tablet (25 mg) by oral route once daily in the morning for 90 days   Lyrica 300 mg oral capsule 07/17/2019 1 po BID " "  Naprosyn 500 mg oral tablet 07/17/2019 take 1 tablet (500 mg) by oral route 2 times per day with food for 30 days   pravastatin 80 mg oral tablet 07/17/2019 take 1 tablet (80 mg) by oral route once daily at bedtime for 90 days   Tresiba FlexTouch U-100 100 unit/mL (3 mL) subcutaneous insulin pen 07/17/2019 inject 36 units subcutaneously once daily for 30 days         Allergy List  Allergen Name Date Reaction Notes   NO KNOWN DRUG ALLERGIES --  --  --        Allergies Reconciled  Social History  Finding Status Start/Stop Quantity Notes   Alcohol Former --/-- --  --    Tobacco Current every day --/-- --  --          Immunizations  NameDate Admin Mfg Trade Name Lot Number Route Inj VIS Given VIS Publication   Ueprexjbc45/26/2018 [facility] FLUARIX [id number]  10/26/2018 08/07/2015   Comments: ndc [id number]         Vitals  Date Time BP Position Site L\R Cuff Size HR RR TEMP (F) WT  HT  BMI kg/m2 BSA m2 O2 Sat        09/23/2019 11:02 /72 Sitting    80 - R  97.8 222lbs 6oz 5'  7.5\" 34.31 2.19 93 %          Physical Examination  · Constitutional  o Appearance  o : well developed, well-nourished, in no acute distress  · Eyes  o Conjunctivae  o : conjunctivae normal  · Neck  o Thyroid  o : no thyromegaly  · Respiratory  o Respiratory Effort  o : breathing unlabored  o Auscultation of Lungs  o : clear to ascultation  · Cardiovascular  o Heart  o :   § Auscultation of Heart  § : regular rate and rhythm  o Peripheral Vascular System  o :   § Extremities  § : no edema  · Musculoskeletal  o General  o :   § General Musculoskeletal  § : Muscle tone, strength, and development grossly normal.  · Skin and Subcutaneous Tissue  o General Inspection  o : normal  · Neurologic  o Gait and Station  o :   § Gait Screening  § : normal gait  · Psychiatric  o Mood and Affect  o : mood normal, affect appropriate  · Left DM Foot Exam  o Sensation  o : intact  o Visual Inspection  o : normal ( he used to have calluses before diabetic " "shoes)  o Vascular  o : normal  · Right DM Foot Exam  o Sensation  o : normal  o Visual Inspection  o : normal  o Vascular  o : present          Assessment  · Diabetes mellitus, type 2     250.00/E11.9  · Essential hypertension     401.9/I10  · Hyperlipidemia     272.4/E78.5      Plan  · Orders  o CMP [facility] (85093) - 250.00/E11.9, 272.4/E78.5, 401.9/I10 - 2019  o Hgb A1c [facility] (27262) - 250.00/E11.9 - 2019  o Lipid Panel [facility] (70781) - 250.00/E11.9, 272.4/E78.5, 401.9/I10 - 2019  o Urine microalbumin (31492) - 250.00/E11.9 - 2019  o ACO-17: Screened for tobacco use AND received tobacco cessation intervention (4004F) - - 2019  o ACO-39: Current medications updated and reviewed () - - 2019  · Medications  o atenolol 25 mg oral tablet   SIG: take 1 tablet (25 mg) by oral route once daily for 90 days   DISP: (90) tablet with 1 refills  Adjusted on 2019     o BD Ultra-Fine Micro Pen Needle 32 gauge x 1/4\" miscellaneous needle   SIG: use as directed for 30 days qd   DISP: (1) 100 ct box with 5 refills  Adjusted on 2019     o hydrocodone-acetaminophen 5-325 mg oral tablet   SIG: take 1 tablet by oral route once a day (in the evening) for 30 days   DISP: (30) tablets with 0 refills  Adjusted on 2019     o Januvia 100 mg oral tablet   SIG: take 1 tablet (100 mg) by oral route once daily for 30 days   DISP: (30) tablets with 5 refills  Adjusted on 2019     o Jardiance 25 mg oral tablet   SIG: take 1 tablet (25 mg) by oral route once daily in the morning for 90 days   DISP: (90) tablet with 1 refills  Adjusted on 2019     o Lyrica 300 mg oral capsule   SI po BID   DISP: (60) capsules with 5 refills  Adjusted on 2019     o Naprosyn 500 mg oral tablet   SIG: take 1 tablet (500 mg) by oral route 2 times per day with food for 30 days   DISP: (60) tablets with 5 refills  Adjusted on 2019     o pravastatin 80 mg oral tablet   SIG: take 1 tablet (80 mg) " by oral route once daily at bedtime for 90 days   DISP: (90) tablets with 5 refills  Adjusted on 09/23/2019     o Medications have been Reconciled  o Transition of Care or Provider Policy  · Instructions  o Advised that cheeses and other sources of dairy fats, animal fats, fast food, and the extras (candy, pasteries, pies, doughnuts and cookies) all contain LDL raising nutrients. Advised to increase fruits, vegetables, whole grains, and to monitor portion sizes.   o Patient was educated/instructed on their diagnosis, treatment and medications prior to discharge from the clinic today.            Electronically Signed by: Jaci Talbert MD -Author on September 23, 2019 11:31:28 AM

## 2021-05-07 NOTE — PROGRESS NOTES
"   Progress Note      Patient Name: Zechariah Fleming   Patient ID: [id number]   Sex: Male   YOB: 1965        Visit Date: March 26, 2018    Provider: Jaci Talbert MD   Location: [facility] [place], [facility]   Location Address: [address] [place] [address]  [id number]   Location Phone: [phone]          Chief Complaint     Follow up for labs and refills             History Of Present Illness  Zechariah Fleming is a 52 year old /White male who presents for evaluation and treatment of:      He thinks he still has ear infection.  He has had a lot of congestion.  He doesn't want a shot because he was sore for three days the last time.   He doesn't know how his sugar will be.  He is on Tresiba 30 units       Past Medical History  Disease Name Date Onset Notes   Diabetes --  --    Hyperlipemia --  --    Reflux --  --          Past Surgical History  Procedure Name Date Notes   Shoulder surgery --  --          Medication List  Name Date Started Instructions   atenolol 25 mg oral tablet 11/21/2017 take 1 tablet (25 mg) by oral route once daily for 90 days   BD Ultra-Fine [place] Pen Needles 32 gauge x 5/32\" miscellaneous needle 03/05/2018 use as directed for 30 days   Hyoscyamine 0.125 MG oral tablet 11/21/2017 dissolve 1 tablet under tongue 3 times a day as needed   Januvia 100 mg oral tablet 12/22/2017 take 1 tablet (100 mg) by oral route once daily for 30 days   Jardiance 25 mg oral tablet 11/21/2017 take 1 tablet (25 mg) by oral route once daily in the morning for 90 days   loratadine 10 mg oral tablet 11/21/2017 take 1 tablet (10 mg) by oral route once daily for 90 days   Lyrica 100 mg oral capsule 11/21/2017 take 1 capsule (100 mg) by oral route 3 times per day for 30 days   Lyrica 300 mg oral capsule 11/21/2017 take 1 capsule (300 mg) by oral route 2 times per day for 30 days   Naprosyn 500 mg oral tablet 12/22/2017 take 1 tablet (500 mg) by oral route 2 times per day with food for " 30 days   omeprazole 20 mg oral capsule,delayed release(DR/EC)  --    pravastatin 40 mg oral tablet 02/14/2018 take 1 tablet (40 mg) by oral route once daily at bedtime for 30 days   Tresiba FlexTouch U-100 100 unit/mL (3 mL) subcutaneous insulin pen 02/14/2018 inject 25 units by subcutaneous route daily for 30 days         Social History  Finding Status Start/Stop Quantity Notes   Alcohol Former --/-- --  --    Tobacco Current every day --/-- --  --          Vitals  Date Time BP Position Site L\R Cuff Size HR RR TEMP(F) WT  HT  BMI kg/m2 BSA m2 O2 Sat HC       03/26/2018 08:13 /80 Sitting    82 - R  97.2 222lbs 0oz    96 %           Physical Examination  · Constitutional  o Appearance  o : well developed, well-nourished, in no acute distress  · Head and Face  o HEENT  o : TM's are both red the L is worse than the R  · Eyes  o Conjunctivae  o : conjunctivae normal  · Neck  o Inspection/Palpation  o : supple  o Thyroid  o : no thyromegaly  · Respiratory  o Respiratory Effort  o : breathing unlabored  o Auscultation of Lungs  o : clear to ascultation  · Cardiovascular  o Heart  o :   § Auscultation of Heart  § : regular rate and rhythm  o Peripheral Vascular System  o :   § Extremities  § : mild edema  · Lymphatic  o Neck  o : no lymphadenopathy present  · Musculoskeletal  o General  o :   § General Musculoskeletal  § : Muscle tone, strength, and development grossly normal.  · Neurologic  o Gait and Station  o :   § Gait Screening  § : normal gait  · Psychiatric  o Mood and Affect  o : mood normal, affect appropriate                Assessment  · Diabetes mellitus, type 2     250.00/E11.9  · Otitis media     382.9/H66.90      Plan  · Orders  o Hgb A1c [facility] ([id number]) - - 03/26/2018  o ACO-39: Current medications updated and reviewed () - - 03/26/2018  · Medications  o Augmentin 875-125 mg oral tablet   SIG: take 1 tablet by oral route every 12 hours for 7 days   DISP: (14) tablets with 0 refills  Prescribed on  03/26/2018     · Instructions  o Patient was educated/instructed on their diagnosis, treatment and medications prior to discharge from the clinic today.            Electronically Signed by: Jaci Talbert MD -Author on March 26, 2018 07:41:50 PM

## 2021-05-07 NOTE — PROGRESS NOTES
"   Progress Note      Patient Name: Zechariah Fleming   Patient ID: [id number]   Sex: Male   YOB: 1965    Primary Care Provider: Jaci Talbert MD   Referring Provider: Jaci Talbert MD    Visit Date: July 17, 2019    Provider: Jaci Talbert MD   Location: [facility]   Location Address: [address] [place] [address]   [place]  [address]   Location Phone: [phone]          Chief Complaint     Medication refill and want's labs ordered but doesn't want to get today       History Of Present Illness  Zechariah Fleming is a 53 year old /White male who presents for evaluation and treatment of:      He had spurs in his shoulders and he feels like his arm hurts the same as it did when Dr. Garduno shaved off the spurs.  He doesn't want to lie on that shoulder.  That was done in 2011.  He has an appointment 8/19/19.  He doesn't check his sugar but he feels good.  He does have some neuropathy in his R foot and had to take his Lyrica. He feels good  He requests some hydrocodone to take at night so that he can sleep.  The patches were $14 per box.  He says he doesn't use drugs.       Past Medical History  Disease Name Date Onset Notes   Diabetes --  --    Hyperlipemia --  --    Peripheral neuropathy --  --    Reflux --  --          Past Surgical History  Procedure Name Date Notes   Shoulder surgery --  --          Medication List  Name Date Started Instructions   atenolol 25 mg oral tablet 07/17/2019 take 1 tablet (25 mg) by oral route once daily for 90 days   BD Ultra-Fine Micro Pen Needle 32 gauge x 1/4\" miscellaneous needle 07/17/2019 use as directed for 30 days qd   Januvia 100 mg oral tablet 07/17/2019 take 1 tablet (100 mg) by oral route once daily for 30 days   Jardiance 25 mg oral tablet 07/17/2019 take 1 tablet (25 mg) by oral route once daily in the morning for 90 days   Lyrica 300 mg oral capsule 07/17/2019 1 po BID   Naprosyn 500 mg oral tablet 07/17/2019 take 1 " "tablet (500 mg) by oral route 2 times per day with food for 30 days   omeprazole 20 mg oral capsule,delayed release(DR/EC)  --    pravastatin 80 mg oral tablet 07/17/2019 take 1 tablet (80 mg) by oral route once daily at bedtime for 90 days   Tresiba FlexTouch U-100 100 unit/mL (3 mL) subcutaneous insulin pen 07/17/2019 inject 36 units subcutaneously once daily for 30 days   Vitamin D2 50,000 unit oral capsule 03/04/2019 take 1 capsule (50,000 unit) by oral route once weekly for 90 days         Allergy List  Allergen Name Date Reaction Notes   NO KNOWN DRUG ALLERGIES --  --  --          Social History  Finding Status Start/Stop Quantity Notes   Alcohol Former --/-- --  --    Tobacco Current every day --/-- --  --          Immunizations  NameDate Admin Mfg Trade Name Lot Number Route Inj VIS Given VIS Publication   Oedyxbmso66/26/2018 [facility] FLUARIX [id number]  10/26/2018 08/07/2015   Comments: ndc [id number]         Vitals  Date Time BP Position Site L\R Cuff Size HR RR TEMP (F) WT  HT  BMI kg/m2 BSA m2 O2 Sat HC       07/17/2019 01:19 /72 Sitting    81 - R  97.6 212lbs 8oz 5'  7.5\" 32.79 2.14 95 %          Physical Examination  · Constitutional  o Appearance  o : well developed, well-nourished, he is a little uncomfortable wearing his sling on the R shoulder  · Eyes  o Conjunctivae  o : conjunctivae normal  · Neck  o Thyroid  o : no thyromegaly  · Respiratory  o Respiratory Effort  o : breathing unlabored  o Auscultation of Lungs  o : clear to ascultation  · Cardiovascular  o Heart  o :   § Auscultation of Heart  § : regular rate and rhythm  o Peripheral Vascular System  o :   § Extremities  § : no edema  · Lymphatic  o Neck  o : no lymphadenopathy present  · Musculoskeletal  o General  o :   § General Musculoskeletal  § : R arm in sling  · Skin and Subcutaneous Tissue  o General Inspection  o : ok  · Neurologic  o Gait and Station  o :   § Gait Screening  § : normal gait  · Psychiatric  o Mood and Affect  o : " "mood normal, affect appropriate  · Left DM Foot Exam  o Sensation  o : ok  o Visual Inspection  o : normal  o Vascular  o : weak          Assessment  · Diabetes mellitus, type 2     250.00/E11.9  · Essential hypertension     401.9/I10  · Vitamin D deficiency     268.9/E55.9  · Shoulder pain, right     719.41/M25.511      Plan  · Orders  o Hgb A1c [facility] (18877) - 250.00/E11.9 - 2019  o Lipid Panel [facility] (08644) - 250.00/E11.9 - 2019  o CMP [facility] (12228) - 250.00/E11.9, 401.9/I10 - 2019  o Hgb A1c [facility] (68506) - 250.00/E11.9 - 2019  o Lipid Panel [facility] (68842) - 250.00/E11.9, 401.9/I10 - 2019  o Urine microalbumin (26715) - 250.00/E11.9 - 2019  o Vitamin D (25-Hydroxy) Level (43813) - 268.9/E55.9 - 2019  o ACO-39: Current medications updated and reviewed () - - 2019  · Medications  o hydrocodone-acetaminophen 5-325 mg oral tablet   SIG: take 1 tablet by oral route once a day (in the evening) for 30 days   DISP: (30) tablets with 0 refills  Prescribed on 2019     o atenolol 25 mg oral tablet   SIG: take 1 tablet (25 mg) by oral route once daily for 90 days   DISP: (90) tablet with 1 refills  Adjusted on 2019     o BD Ultra-Fine Micro Pen Needle 32 gauge x 1/\" miscellaneous needle   SIG: use as directed for 30 days qd   DISP: (1) 100 ct box with 5 refills  Adjusted on 2019     o Januvia 100 mg oral tablet   SIG: take 1 tablet (100 mg) by oral route once daily for 30 days   DISP: (30) tablets with 5 refills  Adjusted on 2019     o Jardiance 25 mg oral tablet   SIG: take 1 tablet (25 mg) by oral route once daily in the morning for 90 days   DISP: (90) tablet with 1 refills  Adjusted on 2019     o Lyrica 300 mg oral capsule   SI po BID   DISP: (60) capsules with 5 refills  Adjusted on 2019     o Naprosyn 500 mg oral tablet   SIG: take 1 tablet (500 mg) by oral route 2 times per day with food for 30 days   DISP: (60) tablets with 5 " refills  Adjusted on 07/17/2019     o pravastatin 80 mg oral tablet   SIG: take 1 tablet (80 mg) by oral route once daily at bedtime for 90 days   DISP: (90) tablets with 5 refills  Adjusted on 07/17/2019     o Tresiba FlexTouch U-100 100 unit/mL (3 mL) subcutaneous insulin pen   SIG: inject 36 units subcutaneously once daily for 30 days   DISP: (4) Insulin pens with 5 refills  Adjusted on 07/17/2019     · Instructions  o Patient was educated/instructed on their diagnosis, treatment and medications prior to discharge from the clinic today.            Electronically Signed by: Jaci Talbert MD -Author on July 17, 2019 05:18:17 PM

## 2021-05-07 NOTE — PROGRESS NOTES
"   Progress Note      Patient Name: Zechariah Fleming Jr.   Patient ID: [id number]   Sex: Male   YOB: 1965    Primary Care Provider: Jaci Talbert MD   Referring Provider: Jaci Talbert MD    Visit Date: April 13, 2021    Provider: Robert García MD   Location: [facility]   Location Address: [address] [place] [address]   [place]  [address]   Location Phone: [phone]          Chief Complaint     refills and labs, patient is fasting       History Of Present Illness  Zechariah Fleming Jr. is a 55 year old /White male who presents for evaluation and treatment of:      need labs and refills  HTN- controlled on meds  hyperlipidemia- unknown control- need labs to assess  DM II- unknown control- need labs to assess  pt tolerating meds well  pt smokes 1-1.5 ppd x 42 yrs- pt urged to quit smoking  diabetic peripheral neuropathy pain controlled on lyrica- pt taking med as supposed to- pt not showing any signs of depression or addiction       Past Medical History  Disease Name Date Onset Notes   Diabetes --  --    Hyperlipemia --  --    Peripheral neuropathy --  --    Reflux --  --          Past Surgical History  Procedure Name Date Notes   Shoulder surgery --  --          Medication List  Name Date Started Instructions   ATENOLOL 25MG TABLETS 01/12/2021 TAKE 1 TABLET BY MOUTH ONCE DAILY   BD Ultra-Fine Micro Pen Needle 32 gauge x 1/4\" miscellaneous needle 08/18/2020 use as directed for 30 days qd   Januvia 100 mg oral tablet 08/18/2020 take 1 tablet (100 mg) by oral route once daily for 30 days   Jardiance 25 mg oral tablet 08/18/2020 take 1 tablet (25 mg) by oral route once daily in the morning for 30 days   lisinopril 5 mg oral tablet 08/18/2020 take 1 tablet (5 mg) by oral route once daily for 30 days   Lyrica 300 mg oral capsule 08/18/2020 1 po BID   Naprosyn 500 mg oral tablet 08/18/2020 take 1 tablet (500 mg) by oral route 2 times per day with food for 30 days " "  pravastatin 80 mg oral tablet 08/18/2020 take 1 tablet (80 mg) by oral route once daily at bedtime for 30 days   Tresiba FlexTouch U-100 100 unit/mL (3 mL) subcutaneous insulin pen 08/18/2020 inject 36 units subcutaneously once daily for 30 days   Viagra 100 mg oral tablet 08/18/2020 take 1 tablet (100 mg) by oral route once daily as needed approximately 1 hour before sexual activity for 30 days         Allergy List  Allergen Name Date Reaction Notes   NO KNOWN DRUG ALLERGIES --  --  --          Social History  Finding Status Start/Stop Quantity Notes   Alcohol Former --/-- --  --    Tobacco Current every day --/-- 1.5 pk daily --          Immunizations  NameDate Admin Mfg Trade Name Lot Number Route Inj VIS Given VIS Publication   Ebnoahtxd92/05/2019 UNK Unknown TradeName [id number] NE NE 02/04/2020    Comments: [facility]         Review of Systems  · Constitutional  o Denies  o : fatigue, fever  · Cardiovascular  o Denies  o : chest pain, palpitations  · Respiratory  o Denies  o : shortness of breath, cough  · Gastrointestinal  o Denies  o : nausea, vomiting, diarrhea  · Psychiatric  o Denies  o : anxiety, depression      Vitals  Date Time BP Position Site L\R Cuff Size HR RR TEMP (F) WT  HT  BMI kg/m2 BSA m2 O2 Sat FR L/min FiO2 HC       04/13/2021 09:22 /84 Sitting    108 - R  96.9 201lbs 2oz 5'  7.25\" 31.27 2.08 95 %  21%          Physical Examination  · Constitutional  o Appearance  o : well developed, well-nourished, in no acute distress  · Head and Face  o HEENT  o : Unremarkable, MMM  · Eyes  o Conjunctivae  o : conjunctivae normal  o Pupils and Irises  o : pupils equal and round, pupils reactive to light bilaterally  · Neck  o Inspection/Palpation  o : supple  o Thyroid  o : no thyromegaly  · Respiratory  o Respiratory Effort  o : breathing unlabored  o Auscultation of Lungs  o : clear to ascultation  · Cardiovascular  o Heart  o :   § Auscultation of Heart  § : regular rate and rhythm  o Peripheral " Vascular System  o :   § Extremities  § : no edema  · Gastrointestinal  o Abdomen  o : soft, non-tender, non-distended, + bowel sounds, no hepatosplenomegaly, no masses palpated  · Lymphatic  o Neck  o : no lymphadenopathy present in neck  · Musculoskeletal  o General  o :   § General Musculoskeletal  § : No joint swelling or deformity. Muscle tone, strength, and development grossly normal.  · Neurologic  o Gait and Station  o :   § Gait Screening  § : normal gait  · Psychiatric  o Mood and Affect  o : mood normal, affect appropriate  · Left DM Foot Exam  o Sensation  o : reduced sensation in some areas   o Visual Inspection  o : visual inspection is normal with no signs of breakdown, ulcerations or deformities unless otherwise noted.   o Vascular  o : palpable dorsalis pedis and posterir tibialis pulses present, normal capillary refill  · Right DM Foot Exam  o Sensation  o : reduced sensation in some areas   o Visual Inspection  o : visual inspection is normal with no signs of breakdown, ulcerations or deformities unless otherwise noted.   o Vascular  o : palpable dorsalis pedis and posterir tibialis pulses present, normal capillary refill          Assessment  · Diabetes mellitus, type 2     250.00/E11.9  · Essential hypertension     401.9/I10  · Hyperlipidemia     272.4/E78.5  · Tobacco abuse     305.1/Z72.0  · Screening PSA (prostate specific antigen)     V76.44/Z12.5  · Screen for colon cancer     V76.51/Z12.11  · Drug therapy     V58.69/Z79.899      Plan  · Orders  o CBC with Auto Diff [facility] (51776) - 250.00/E11.9 - 04/13/2021  o CMP [facility] (32752) - 250.00/E11.9 - 04/13/2021  o Hgb A1c [facility] (82371) - 250.00/E11.9 - 04/13/2021  o Lipid Panel [facility] (10935) - 250.00/E11.9 - 04/13/2021  o Urine microalbumin (65266) - 250.00/E11.9 - 04/13/2021  o Thyroid Profile (THYII) - 250.00/E11.9 - 04/13/2021  o Diabetic Foot (Motor and Sensory) Exam Completed [facility] (, , 2028F) - 250.00/E11.9 - 04/13/2021  o Urine Drug Screen  "([facility]) (33910) - V58.69/Z79.899 - 04/13/2021  o ACO-17: Screened for tobacco use AND received tobacco cessation intervention (4004F) - - 04/13/2021  o ACO-19: Colorectal cancer screening results documented and reviewed (3017F) - - 04/13/2021  o ACO-39: Current medications updated and reviewed (1159F, ) - - 04/13/2021  o Low dose CT scan (LDCT) for lung cancer screening [facility] () - 305.1/Z72.0 - 04/13/2021  o PSA Screening, Ultrasensitive, MEDICARE [facility] () - V76.44/Z12.5 - 04/13/2021  o Screening colonoscopy for low risk patient () - V76.51/Z12.11 - 04/13/2021  · Medications  o atenolol 25 mg oral tablet   SIG: take 1 tablet (25 mg) by oral route once daily for 30 days   DISP: (30) Tablet with 5 refills  Prescribed on 04/13/2021     o BD Ultra-Fine Micro Pen Needle 32 gauge x 1/4\" miscellaneous needle   SIG: use as directed for 30 days qd   DISP: (1) Box with 5 refills  Adjusted on 04/13/2021     o Januvia 100 mg oral tablet   SIG: take 1 tablet (100 mg) by oral route once daily for 30 days   DISP: (30) Tablet with 5 refills  Adjusted on 04/13/2021     o Jardiance 25 mg oral tablet   SIG: take 1 tablet (25 mg) by oral route once daily in the morning for 30 days   DISP: (30) Tablet with 5 refills  Adjusted on 04/13/2021     o lisinopril 5 mg oral tablet   SIG: take 1 tablet (5 mg) by oral route once daily for 30 days   DISP: (30) Tablet with 5 refills  Adjusted on 04/13/2021     o Lyrica 300 mg oral capsule   SIG: take 1 capsule by oral route 2 times a day as needed for 30 days neuropathy pain   DISP: (60) Capsule with 2 refills  Adjusted on 04/13/2021     o Naprosyn 500 mg oral tablet   SIG: take 1 tablet (500 mg) by oral route 2 times per day with food for 30 days   DISP: (60) Tablet with 5 refills  Adjusted on 04/13/2021     o pravastatin 80 mg oral tablet   SIG: take 1 tablet (80 mg) by oral route once daily at bedtime for 30 days   DISP: (30) Tablet with 5 refills  Adjusted on 04/13/2021 "     o Tresiba FlexTouch U-100 100 unit/mL (3 mL) subcutaneous insulin pen   SIG: inject 36 units subcutaneously once daily for 30 days   DISP: (4) Pre-filled Pen Syringe with 5 refills  Adjusted on 04/13/2021     o Viagra 100 mg oral tablet   SIG: take 1 tablet (100 mg) by oral route once daily as needed approximately 1 hour before sexual activity for 30 days   DISP: (12) Tablet with 5 refills  Adjusted on 04/13/2021     o ATENOLOL 25MG TABLETS   SIG: TAKE 1 TABLET BY MOUTH ONCE DAILY   DISP: (90) Tablet with -1 refills  Discontinued on 04/13/2021     o Medications have been Reconciled  o Transition of Care or Provider Policy  · Instructions  o Advised that cheeses and other sources of dairy fats, animal fats, fast food, and the extras (candy, pasteries, pies, doughnuts and cookies) all contain LDL raising nutrients. Advised to increase fruits, vegetables, whole grains, and to monitor portion sizes.   o Patient was educated/instructed on their diagnosis, treatment and medications prior to discharge from the clinic today.            Electronically Signed by: Robert García MD -Author on April 13, 2021 09:52:58 AM

## 2021-05-09 VITALS
HEART RATE: 106 BPM | OXYGEN SATURATION: 96 % | TEMPERATURE: 96.7 F | DIASTOLIC BLOOD PRESSURE: 80 MMHG | WEIGHT: 210 LBS | SYSTOLIC BLOOD PRESSURE: 120 MMHG

## 2021-05-09 VITALS
BODY MASS INDEX: 33.18 KG/M2 | WEIGHT: 211.37 LBS | OXYGEN SATURATION: 97 % | SYSTOLIC BLOOD PRESSURE: 120 MMHG | HEART RATE: 86 BPM | TEMPERATURE: 96.6 F | HEIGHT: 67 IN | DIASTOLIC BLOOD PRESSURE: 80 MMHG

## 2021-05-09 VITALS
DIASTOLIC BLOOD PRESSURE: 72 MMHG | HEIGHT: 67 IN | SYSTOLIC BLOOD PRESSURE: 130 MMHG | WEIGHT: 212.5 LBS | OXYGEN SATURATION: 95 % | TEMPERATURE: 97.6 F | HEART RATE: 81 BPM | BODY MASS INDEX: 33.35 KG/M2

## 2021-05-09 VITALS
WEIGHT: 197.12 LBS | OXYGEN SATURATION: 92 % | BODY MASS INDEX: 30.94 KG/M2 | TEMPERATURE: 96.6 F | SYSTOLIC BLOOD PRESSURE: 108 MMHG | HEIGHT: 67 IN | DIASTOLIC BLOOD PRESSURE: 72 MMHG | HEART RATE: 110 BPM

## 2021-05-09 VITALS
BODY MASS INDEX: 34.21 KG/M2 | WEIGHT: 218 LBS | SYSTOLIC BLOOD PRESSURE: 122 MMHG | HEIGHT: 67 IN | TEMPERATURE: 97 F | OXYGEN SATURATION: 96 % | DIASTOLIC BLOOD PRESSURE: 74 MMHG | HEART RATE: 86 BPM

## 2021-05-09 VITALS
OXYGEN SATURATION: 94 % | DIASTOLIC BLOOD PRESSURE: 80 MMHG | TEMPERATURE: 96 F | WEIGHT: 217 LBS | HEART RATE: 103 BPM | SYSTOLIC BLOOD PRESSURE: 120 MMHG

## 2021-05-09 VITALS
HEART RATE: 83 BPM | DIASTOLIC BLOOD PRESSURE: 68 MMHG | TEMPERATURE: 97.8 F | WEIGHT: 204 LBS | SYSTOLIC BLOOD PRESSURE: 126 MMHG | OXYGEN SATURATION: 97 %

## 2021-05-09 VITALS
WEIGHT: 195 LBS | DIASTOLIC BLOOD PRESSURE: 60 MMHG | TEMPERATURE: 97.4 F | OXYGEN SATURATION: 97 % | SYSTOLIC BLOOD PRESSURE: 124 MMHG | HEART RATE: 74 BPM

## 2021-05-09 VITALS
TEMPERATURE: 96.9 F | WEIGHT: 201.12 LBS | HEIGHT: 67 IN | BODY MASS INDEX: 31.57 KG/M2 | SYSTOLIC BLOOD PRESSURE: 130 MMHG | OXYGEN SATURATION: 95 % | HEART RATE: 108 BPM | DIASTOLIC BLOOD PRESSURE: 84 MMHG

## 2021-05-09 VITALS
DIASTOLIC BLOOD PRESSURE: 78 MMHG | SYSTOLIC BLOOD PRESSURE: 126 MMHG | TEMPERATURE: 96.9 F | HEART RATE: 90 BPM | WEIGHT: 218.37 LBS | OXYGEN SATURATION: 97 % | BODY MASS INDEX: 34.27 KG/M2 | HEIGHT: 67 IN

## 2021-05-09 VITALS
DIASTOLIC BLOOD PRESSURE: 72 MMHG | HEART RATE: 80 BPM | TEMPERATURE: 97.8 F | WEIGHT: 222.37 LBS | BODY MASS INDEX: 34.9 KG/M2 | HEIGHT: 67 IN | OXYGEN SATURATION: 93 % | SYSTOLIC BLOOD PRESSURE: 110 MMHG

## 2021-05-09 VITALS
WEIGHT: 200.37 LBS | DIASTOLIC BLOOD PRESSURE: 72 MMHG | HEART RATE: 76 BPM | OXYGEN SATURATION: 97 % | SYSTOLIC BLOOD PRESSURE: 110 MMHG | TEMPERATURE: 97.9 F

## 2021-05-09 VITALS
OXYGEN SATURATION: 96 % | SYSTOLIC BLOOD PRESSURE: 146 MMHG | HEART RATE: 82 BPM | TEMPERATURE: 97.2 F | DIASTOLIC BLOOD PRESSURE: 80 MMHG | WEIGHT: 222 LBS

## 2021-05-09 VITALS
HEIGHT: 67 IN | BODY MASS INDEX: 32.51 KG/M2 | TEMPERATURE: 96.9 F | OXYGEN SATURATION: 99 % | SYSTOLIC BLOOD PRESSURE: 118 MMHG | DIASTOLIC BLOOD PRESSURE: 82 MMHG | HEART RATE: 76 BPM | WEIGHT: 207.12 LBS

## 2021-05-10 NOTE — H&P
History and Physical      Patient Name: Zechariah Fleming   Patient ID: [id number]   Sex: Male   YOB: 1965    Primary Care Provider: Warren Seymour MD   Referring Provider: Brittni FARIA    Visit Date: July 15, 2020    Provider: Donta Hardy MD   Location: [facility]   Location Address: [address] [place] [address]  [id number]   Location Phone: [phone]          Chief Complaint  · Surgical History and Physical  · Screening Colonoscopy      History Of Present Illness  NON-INPATIENT HISTORY AND PHYSICAL  Allergies: NO KNOWN DRUG ALLERGIES   Chief Complaint/History of Present Illness: Screening Colonoscopy   Colon Recall: No   Failed Outpatient Treatment/Contraindications: N/A   Current Medications: atenolol 25 mg oral tablet, Januvia 100 mg oral tablet, Jardiance 10 mg oral tablet, Lyrica 300 mg oral capsule, naproxen 500 mg oral tablet, NuLYTELY with Flavor Packs 420 gram oral recon soln, pravastatin 80 mg oral tablet, Prilosec 20 mg oral capsule,delayed release(DR/EC), and Tresiba FlexTouch U-100 100 unit/mL (3 mL) subcutaneous insulin pen   Significant Past Medical History: Constipation, Diabetes, Diabetes, unspecified, Heart disease (organic), High blood pressure, and Right shoulder adhesive capsulitis   Significant Family Medical History: No Family History of Colon Cancer, Family History of Stomach Cancer: Uncle, Maternal Grandfather   Significant Past Surgical History: Arthroscopic shoulder surgery, left, Arthroscopic shoulder surgery, right, Colonoscopy, and EGD   Previous Colonoscopy: Yes YEAR: 2015 By Whom: Donta Hardy MD   Previous EGD: Yes YEAR: 2015 By Whom: Donta Hardy MD   PHYSICAL EXAM:  Heart: Regular Rate and Rhythm   Lungs: Breathing Unlabored           Assessment  · Preoperative examination     V72.84/Z01.818  · Screening for colon cancer     V76.51/Z12.11      Plan  · Orders  o Consent for Colonoscopy Screening -Possible risk/complications,  benefits, and alternatives to surgical or invasive procedure have been explained to patient and/or legal gaurdian. -Patient has been evaluated and can tolerate anethesia and/or sedation. Risk, benefits, and alternatives to anesthesia and sedation have been explained to patient or legal gaurdian. () - V72.84/Z01.818, V76.51/Z12.11 - 09/09/2020  · Medications  o Medications have been Reconciled  o Transition of Care or Provider Policy  · Instructions  o ****Surgical Orders****  o ***************  o Outpatient  o ***************  o RISK AND BENEFITS:  o Possible risks/complications, benefits and alternatives to surgical or invasive procedure have been explained to the patient and/or legal guardian.  o Patient has been evaluated and can tolerate anesthesia and/or sedation. Risks, benefits, and alternatives to anesthesia and sedation have been explained to the patient and/or legal guardian.  o ***************  o PREP: Per protocol  o IV: Per Anesthesia  o The above History and Physical Examination has been completed within 30 days of admission.  o This note has been transcribed by ISADORA Guo MA. I have read and agree with the findings in this note.  o Electronically Identified Patient Education Materials Provided Electronically  · Disposition  o Call or Return if symptoms worsen or persist.            Electronically Signed by: Jorge Alberto Guo, -Author on July 15, 2020 10:32:42 AM

## 2021-08-05 RX ORDER — PREGABALIN 300 MG/1
CAPSULE ORAL
Qty: 60 CAPSULE | OUTPATIENT
Start: 2021-08-05

## 2021-08-05 RX ORDER — PREGABALIN 300 MG/1
CAPSULE ORAL
COMMUNITY
Start: 2021-06-21 | End: 2021-12-02

## 2021-08-09 NOTE — TELEPHONE ENCOUNTER
Caller: Zechariah Fleming    Relationship: Self    Best call back number: [phone]     Medication needed:   Requested Prescriptions     Pending Prescriptions Disp Refills   • pregabalin (LYRICA) 300 MG capsule           What is the patient's preferred pharmacy: [id number] [place], [place] - [address] AT [facility] - [phone]  - [phone] FX     PATIENTS WIFE STATED THAT THEY RECEIVED A CALL STATING THAT THE MEDICATION WAS SENT TO THE PHARMACY BUT IT WAS NOT THERE WHEN THEY WENT TO PICK IT UP. PLEASE ADVISE

## 2021-08-10 RX ORDER — PREGABALIN 300 MG/1
CAPSULE ORAL
OUTPATIENT
Start: 2021-08-10

## 2021-08-11 NOTE — TELEPHONE ENCOUNTER
Caller: OTTO GUTIERREZ    Relationship: WIFE    Best call back number: [phone]     What is the best time to reach you: ANY    Who are you requesting to speak with (clinical staff, provider,  specific staff member): CLINICAL STAFF    Do you know the name of the person who called: ELVIN    What was the call regarding: PATIENT CALLED BACK ABOUT HER HUSBANDS MEDICATION. SHE WOULD LIKE TO SPEAK WITH A NURSE OR HAVE THE OFFICE CALL THE PHARMACY ABOUT HER HUSBANDS LYRICA.    Do you require a callback: YES

## 2021-10-25 RX ORDER — LISINOPRIL 5 MG/1
TABLET ORAL
Qty: 30 TABLET | OUTPATIENT
Start: 2021-10-25

## 2021-10-25 RX ORDER — NAPROXEN 500 MG/1
TABLET ORAL
Qty: 60 TABLET | OUTPATIENT
Start: 2021-10-25

## 2021-10-25 RX ORDER — SITAGLIPTIN 100 MG/1
TABLET, FILM COATED ORAL
Qty: 30 TABLET | OUTPATIENT
Start: 2021-10-25

## 2021-10-25 RX ORDER — PRAVASTATIN SODIUM 80 MG/1
TABLET ORAL
Qty: 30 TABLET | OUTPATIENT
Start: 2021-10-25

## 2021-10-25 RX ORDER — INSULIN DEGLUDEC INJECTION 100 U/ML
INJECTION, SOLUTION SUBCUTANEOUS
Qty: 12 ML | OUTPATIENT
Start: 2021-10-25

## 2021-10-25 RX ORDER — PRAVASTATIN SODIUM 80 MG/1
80 TABLET ORAL DAILY
COMMUNITY
End: 2021-12-02 | Stop reason: SDUPTHER

## 2021-10-25 RX ORDER — INSULIN DEGLUDEC INJECTION 100 U/ML
36 INJECTION, SOLUTION SUBCUTANEOUS DAILY
COMMUNITY
End: 2021-12-02 | Stop reason: SDUPTHER

## 2021-10-25 RX ORDER — EMPAGLIFLOZIN 25 MG/1
TABLET, FILM COATED ORAL
Qty: 30 TABLET | OUTPATIENT
Start: 2021-10-25

## 2021-10-25 RX ORDER — NAPROXEN 500 MG/1
500 TABLET ORAL DAILY
COMMUNITY
Start: 2021-07-21 | End: 2021-12-02 | Stop reason: SDUPTHER

## 2021-10-25 RX ORDER — LISINOPRIL 5 MG/1
5 TABLET ORAL DAILY
COMMUNITY
End: 2021-12-02 | Stop reason: SDUPTHER

## 2021-11-29 ENCOUNTER — TELEPHONE (OUTPATIENT)
Dept: FAMILY MEDICINE CLINIC | Facility: CLINIC | Age: 56
End: 2021-11-29

## 2021-12-02 ENCOUNTER — OFFICE VISIT (OUTPATIENT)
Dept: FAMILY MEDICINE CLINIC | Facility: CLINIC | Age: 56
End: 2021-12-02

## 2021-12-02 VITALS
DIASTOLIC BLOOD PRESSURE: 80 MMHG | OXYGEN SATURATION: 99 % | BODY MASS INDEX: 30.7 KG/M2 | TEMPERATURE: 96.8 F | HEART RATE: 82 BPM | SYSTOLIC BLOOD PRESSURE: 130 MMHG | WEIGHT: 196 LBS

## 2021-12-02 DIAGNOSIS — M19.90 ARTHRITIS: ICD-10-CM

## 2021-12-02 DIAGNOSIS — I10 PRIMARY HYPERTENSION: Primary | ICD-10-CM

## 2021-12-02 DIAGNOSIS — Z87.891 PERSONAL HISTORY OF NICOTINE DEPENDENCE: ICD-10-CM

## 2021-12-02 DIAGNOSIS — Z23 NEED FOR INFLUENZA VACCINATION: ICD-10-CM

## 2021-12-02 DIAGNOSIS — E11.65 TYPE 2 DIABETES MELLITUS WITH HYPERGLYCEMIA, WITH LONG-TERM CURRENT USE OF INSULIN (HCC): ICD-10-CM

## 2021-12-02 DIAGNOSIS — E78.2 MIXED HYPERLIPIDEMIA: ICD-10-CM

## 2021-12-02 DIAGNOSIS — Z79.4 TYPE 2 DIABETES MELLITUS WITH HYPERGLYCEMIA, WITH LONG-TERM CURRENT USE OF INSULIN (HCC): ICD-10-CM

## 2021-12-02 DIAGNOSIS — Z72.0 TOBACCO ABUSE: ICD-10-CM

## 2021-12-02 DIAGNOSIS — Z23 NEED FOR PNEUMOCOCCAL VACCINE: ICD-10-CM

## 2021-12-02 LAB
ALBUMIN SERPL-MCNC: 4.3 G/DL (ref 3.5–5.2)
ALBUMIN UR-MCNC: 12.5 MG/DL
ALBUMIN/GLOB SERPL: 1.4 G/DL
ALP SERPL-CCNC: 124 U/L (ref 39–117)
ALT SERPL W P-5'-P-CCNC: 19 U/L (ref 1–41)
ANION GAP SERPL CALCULATED.3IONS-SCNC: 9.4 MMOL/L (ref 5–15)
AST SERPL-CCNC: 24 U/L (ref 1–40)
BASOPHILS # BLD AUTO: 0.14 10*3/MM3 (ref 0–0.2)
BASOPHILS NFR BLD AUTO: 1.3 % (ref 0–1.5)
BILIRUB SERPL-MCNC: 0.4 MG/DL (ref 0–1.2)
BUN SERPL-MCNC: 10 MG/DL (ref 6–20)
BUN/CREAT SERPL: 12.7 (ref 7–25)
CALCIUM SPEC-SCNC: 9.6 MG/DL (ref 8.6–10.5)
CHLORIDE SERPL-SCNC: 97 MMOL/L (ref 98–107)
CHOLEST SERPL-MCNC: 311 MG/DL (ref 0–200)
CO2 SERPL-SCNC: 27.6 MMOL/L (ref 22–29)
CREAT SERPL-MCNC: 0.79 MG/DL (ref 0.76–1.27)
DEPRECATED RDW RBC AUTO: 44.7 FL (ref 37–54)
EOSINOPHIL # BLD AUTO: 0.52 10*3/MM3 (ref 0–0.4)
EOSINOPHIL NFR BLD AUTO: 4.8 % (ref 0.3–6.2)
ERYTHROCYTE [DISTWIDTH] IN BLOOD BY AUTOMATED COUNT: 14.4 % (ref 12.3–15.4)
GFR SERPL CREATININE-BSD FRML MDRD: 101 ML/MIN/1.73
GLOBULIN UR ELPH-MCNC: 3.1 GM/DL
GLUCOSE SERPL-MCNC: 409 MG/DL (ref 65–99)
HCT VFR BLD AUTO: 53.6 % (ref 37.5–51)
HDLC SERPL-MCNC: 39 MG/DL (ref 40–60)
HGB BLD-MCNC: 17.7 G/DL (ref 13–17.7)
IMM GRANULOCYTES # BLD AUTO: 0.06 10*3/MM3 (ref 0–0.05)
IMM GRANULOCYTES NFR BLD AUTO: 0.6 % (ref 0–0.5)
LDLC SERPL CALC-MCNC: 149 MG/DL (ref 0–100)
LDLC/HDLC SERPL: 3.73 {RATIO}
LYMPHOCYTES # BLD AUTO: 4.08 10*3/MM3 (ref 0.7–3.1)
LYMPHOCYTES NFR BLD AUTO: 37.9 % (ref 19.6–45.3)
MCH RBC QN AUTO: 29 PG (ref 26.6–33)
MCHC RBC AUTO-ENTMCNC: 33 G/DL (ref 31.5–35.7)
MCV RBC AUTO: 87.9 FL (ref 79–97)
MONOCYTES # BLD AUTO: 0.84 10*3/MM3 (ref 0.1–0.9)
MONOCYTES NFR BLD AUTO: 7.8 % (ref 5–12)
NEUTROPHILS NFR BLD AUTO: 47.6 % (ref 42.7–76)
NEUTROPHILS NFR BLD AUTO: 5.13 10*3/MM3 (ref 1.7–7)
NRBC BLD AUTO-RTO: 0.1 /100 WBC (ref 0–0.2)
PLATELET # BLD AUTO: 274 10*3/MM3 (ref 140–450)
PMV BLD AUTO: 12 FL (ref 6–12)
POTASSIUM SERPL-SCNC: 4.4 MMOL/L (ref 3.5–5.2)
PROT SERPL-MCNC: 7.4 G/DL (ref 6–8.5)
RBC # BLD AUTO: 6.1 10*6/MM3 (ref 4.14–5.8)
SODIUM SERPL-SCNC: 134 MMOL/L (ref 136–145)
T4 FREE SERPL-MCNC: 1.06 NG/DL (ref 0.93–1.7)
TRIGL SERPL-MCNC: 632 MG/DL (ref 0–150)
TSH SERPL DL<=0.05 MIU/L-ACNC: 1.49 UIU/ML (ref 0.27–4.2)
VLDLC SERPL-MCNC: 123 MG/DL (ref 5–40)
WBC NRBC COR # BLD: 10.77 10*3/MM3 (ref 3.4–10.8)

## 2021-12-02 PROCEDURE — 99214 OFFICE O/P EST MOD 30 MIN: CPT | Performed by: FAMILY MEDICINE

## 2021-12-02 PROCEDURE — 80053 COMPREHEN METABOLIC PANEL: CPT | Performed by: FAMILY MEDICINE

## 2021-12-02 PROCEDURE — 84443 ASSAY THYROID STIM HORMONE: CPT | Performed by: FAMILY MEDICINE

## 2021-12-02 PROCEDURE — 82043 UR ALBUMIN QUANTITATIVE: CPT | Performed by: FAMILY MEDICINE

## 2021-12-02 PROCEDURE — 84439 ASSAY OF FREE THYROXINE: CPT | Performed by: FAMILY MEDICINE

## 2021-12-02 PROCEDURE — 85025 COMPLETE CBC W/AUTO DIFF WBC: CPT | Performed by: FAMILY MEDICINE

## 2021-12-02 PROCEDURE — 83036 HEMOGLOBIN GLYCOSYLATED A1C: CPT | Performed by: FAMILY MEDICINE

## 2021-12-02 PROCEDURE — 80061 LIPID PANEL: CPT | Performed by: FAMILY MEDICINE

## 2021-12-02 RX ORDER — ATENOLOL 25 MG/1
25 TABLET ORAL DAILY
Qty: 90 TABLET | Refills: 1 | Status: SHIPPED | OUTPATIENT
Start: 2021-12-02 | End: 2022-06-09 | Stop reason: SDUPTHER

## 2021-12-02 RX ORDER — INSULIN DEGLUDEC INJECTION 100 U/ML
36 INJECTION, SOLUTION SUBCUTANEOUS DAILY
Qty: 33 ML | Refills: 1 | Status: SHIPPED | OUTPATIENT
Start: 2021-12-02 | End: 2022-06-09 | Stop reason: SDUPTHER

## 2021-12-02 RX ORDER — NAPROXEN 500 MG/1
500 TABLET ORAL DAILY
Qty: 90 TABLET | Refills: 1 | Status: SHIPPED | OUTPATIENT
Start: 2021-12-02 | End: 2022-06-09 | Stop reason: SDUPTHER

## 2021-12-02 RX ORDER — PRAVASTATIN SODIUM 80 MG/1
80 TABLET ORAL DAILY
Qty: 90 TABLET | Refills: 1 | Status: SHIPPED | OUTPATIENT
Start: 2021-12-02 | End: 2022-06-09 | Stop reason: SDUPTHER

## 2021-12-02 RX ORDER — LISINOPRIL 5 MG/1
5 TABLET ORAL DAILY
Qty: 90 TABLET | Refills: 1 | Status: SHIPPED | OUTPATIENT
Start: 2021-12-02 | End: 2022-06-09 | Stop reason: SDUPTHER

## 2021-12-02 NOTE — PROGRESS NOTES
Chief Complaint  Diabetes (Refills Can not reconcile meds. Pt unsure. Will bring next visit. ), Hypertension, and Hyperlipidemia    Subjective          Zechariah Fleming presents to [facility]  Pt urged to quit smoking- discussed R&B of meds- pt has no h/o seizures- pt refuses to try any meds- he has tried everything and had no success- pt smokes 1ppd x 41+ yrs    Hypertension  This is a chronic problem. The current episode started more than 1 year ago. The problem has been gradually improving since onset. The problem is controlled. Pertinent negatives include no anxiety, blurred vision, chest pain, headaches, malaise/fatigue, neck pain, orthopnea, palpitations, peripheral edema, PND, shortness of breath or sweats. There are no associated agents to hypertension. Risk factors for coronary artery disease include diabetes mellitus, dyslipidemia, family history, male gender and smoking/tobacco exposure. Current antihypertension treatment includes ACE inhibitors and beta blockers. The current treatment provides significant improvement. There are no compliance problems.    Hyperlipidemia  This is a chronic problem. The current episode started more than 1 year ago. The problem is controlled. Recent lipid tests were reviewed and are variable. There are no known factors aggravating his hyperlipidemia. Pertinent negatives include no chest pain, focal sensory loss, focal weakness, leg pain, myalgias or shortness of breath. Current antihyperlipidemic treatment includes statins. The current treatment provides significant improvement of lipids. There are no compliance problems.    Diabetes  He presents for his follow-up diabetic visit. He has type 2 diabetes mellitus. His disease course has been stable. There are no hypoglycemic associated symptoms. Pertinent negatives for hypoglycemia include no headaches or sweats. Pertinent negatives for diabetes include no blurred vision, no chest pain, no  fatigue, no foot paresthesias, no foot ulcerations, no polydipsia, no polyphagia, no polyuria, no visual change, no weakness and no weight loss. There are no hypoglycemic complications. Symptoms are stable. Current diabetic treatment includes insulin injections and oral agent (dual therapy). He is compliant with treatment all of the time. His weight is stable. He is following a generally unhealthy diet. He participates in exercise intermittently. An ACE inhibitor/angiotensin II receptor blocker is being taken. He sees a podiatrist.Eye exam is current.       Objective   No Known Allergies  Immunization History   Administered Date(s) Administered   • COVID-19 (PFIZER) 03/22/2021, 04/13/2021   • Flu Vaccine Quad PF >36MO 10/02/2015, 10/04/2016, 10/26/2018, 12/05/2019, 11/04/2020   • Pneumococcal Polysaccharide (PPSV23) 08/04/2017       Vital Signs:   Vitals:    12/02/21 0826   BP: 130/80   Pulse: 82   Temp: 96.8 °F (36 °C)   SpO2: 99%   Weight: 88.9 kg (196 lb)       Physical Exam  Vitals reviewed.   Constitutional:       Appearance: Normal appearance. He is well-developed.   HENT:      Head: Normocephalic and atraumatic.      Right Ear: External ear normal.      Left Ear: External ear normal.      Mouth/Throat:      Pharynx: No oropharyngeal exudate.   Eyes:      Conjunctiva/sclera: Conjunctivae normal.      Pupils: Pupils are equal, round, and reactive to light.   Cardiovascular:      Rate and Rhythm: Normal rate and regular rhythm.      Pulses: Normal pulses.           Dorsalis pedis pulses are 2+ on the right side and 2+ on the left side.      Heart sounds: Normal heart sounds. No murmur heard.  No friction rub. No gallop.    Pulmonary:      Effort: Pulmonary effort is normal.      Breath sounds: Normal breath sounds. No wheezing or rhonchi.   Abdominal:      General: Bowel sounds are normal. There is no distension.      Palpations: Abdomen is soft. There is no mass.      Tenderness: There is no abdominal  tenderness. There is no guarding or rebound.      Hernia: No hernia is present.   Musculoskeletal:         General: Normal range of motion.      Cervical back: Normal range of motion and neck supple.      Right foot: Normal range of motion. No deformity, bunion, Charcot foot, foot drop or prominent metatarsal heads.      Left foot: Normal range of motion. No deformity, bunion, Charcot foot, foot drop or prominent metatarsal heads.   Feet:      Right foot:      Protective Sensation: 3 sites tested. 3 sites sensed.      Skin integrity: Skin integrity normal. No ulcer or blister.      Toenail Condition: Right toenails are normal.      Left foot:      Protective Sensation: 3 sites tested. 3 sites sensed.      Skin integrity: Skin integrity normal. No ulcer or blister.      Toenail Condition: Left toenails are normal.      Comments:      Skin:     General: Skin is warm and dry.      Capillary Refill: Capillary refill takes less than 2 seconds.   Neurological:      Mental Status: He is alert and oriented to person, place, and time.      Cranial Nerves: No cranial nerve deficit.   Psychiatric:         Mood and Affect: Mood and affect normal.         Behavior: Behavior normal.         Thought Content: Thought content normal.         Judgment: Judgment normal.        Result Review :   The following data was reviewed by: Robert García MD on 12/02/2021:  CMP    CMP 4/13/21 12/2/21   Glucose 360 (A) 409 (A)   BUN 12 10   Creatinine 0.77 0.79   eGFR Non African Am  101   Sodium 137 134 (A)   Potassium 4.5 4.4   Chloride 98 (A) 97 (A)   Calcium 9.5 9.6   Albumin 4.0 4.30   Total Bilirubin 0.29 0.4   Alkaline Phosphatase 116 124 (A)   AST (SGOT) 14 (A) 24   ALT (SGPT) 14 19   (A) Abnormal value            CBC    CBC 4/13/21 12/2/21   WBC 10.86 (A) 10.77   RBC 6.21 (A) 6.10 (A)   Hemoglobin 17.6 17.7   Hematocrit 53.4 (A) 53.6 (A)   MCV 86.0 87.9   MCH 28.3 29.0   MCHC 33.0 33.0   RDW 13.6 14.4   Platelets 308 274   (A) Abnormal  value            Lipid Panel    Lipid Panel 4/13/21 12/2/21   Total Cholesterol  311 (A)   Total Cholesterol 241 (A)    Triglycerides 324 (A) 632 (A)   HDL Cholesterol 46 39 (A)   VLDL Cholesterol 65 (A) 123 (A)   LDL Cholesterol  130 (A) 149 (A)   LDL/HDL Ratio  3.73   (A) Abnormal value       Comments are available for some flowsheets but are not being displayed.           TSH    TSH 4/13/21 12/2/21   TSH 2.660 1.490           Most Recent A1C    HGBA1C Most Recent 12/2/21   Hemoglobin A1C 12.50 (A)   (A) Abnormal value            PSA    PSA 4/13/21   PSA 0.55                     Assessment and Plan    Diagnoses and all orders for this visit:    1. Primary hypertension (Primary)  -     atenolol (Tenormin) 25 MG tablet; Take 1 tablet by mouth Daily.  Dispense: 90 tablet; Refill: 1  -     lisinopril (PRINIVIL,ZESTRIL) 5 MG tablet; Take 1 tablet by mouth Daily.  Dispense: 90 tablet; Refill: 1  -     CBC Auto Differential  -     Comprehensive Metabolic Panel  -     Lipid Panel  -     TSH+Free T4    2. Mixed hyperlipidemia  -     pravastatin (PRAVACHOL) 80 MG tablet; Take 1 tablet by mouth Daily.  Dispense: 90 tablet; Refill: 1    3. Type 2 diabetes mellitus with hyperglycemia, with long-term current use of insulin (HCC)  -     empagliflozin (Jardiance) 25 MG tablet tablet; Take 1 tablet by mouth Daily.  Dispense: 90 tablet; Refill: 1  -     insulin degludec (Tresiba FlexTouch) 100 UNIT/ML solution pen-injector injection; Inject 36 Units under the skin into the appropriate area as directed Daily.  Dispense: 33 mL; Refill: 1  -     SITagliptin (Januvia) 100 MG tablet; Take 1 tablet by mouth Daily.  Dispense: 90 tablet; Refill: 1  -     Hemoglobin A1c  -     MicroAlbumin, Urine, Random - Urine, Clean Catch    4. Arthritis  -     naproxen (NAPROSYN) 500 MG tablet; Take 1 tablet by mouth Daily.  Dispense: 90 tablet; Refill: 1    5. Tobacco abuse  -      CT Chest Low Dose Cancer Screening WO; Future    6. Personal history of  nicotine dependence   -      CT Chest Low Dose Cancer Screening WO; Future    7. Need for influenza vaccination  -     Cancel: FluLaval/Fluarix/Fluzone >6 Months (8340-2946)    8. Need for pneumococcal vaccine  -     Discontinue: pneumococcal polysaccharide 23-valent (PNEUMOVAX-23) vaccine 0.5 mL            Follow Up   Return in about 6 months (around 6/2/2022) for Recheck.  Patient was given instructions and counseling regarding his condition or for health maintenance advice. Please see specific information pulled into the AVS if appropriate.

## 2021-12-02 NOTE — PROGRESS NOTES
Venipuncture Blood Specimen Collection  Venipuncture performed in left arm by Enid Fischer with good hemostasis. Patient tolerated the procedure well without complications.   12/02/21   Enid Fischer

## 2021-12-03 ENCOUNTER — TELEPHONE (OUTPATIENT)
Dept: FAMILY MEDICINE CLINIC | Facility: CLINIC | Age: 56
End: 2021-12-03

## 2021-12-03 LAB — HBA1C MFR BLD: 12.5 % (ref 4.8–5.6)

## 2021-12-03 NOTE — TELEPHONE ENCOUNTER
Patient called after he received his results phone call earlier on labs. He did want to let you know that he has been off of ALL medications for about 6 weeks. He would like to push his follow up out at least two weeks so that he has been on his medication that long before re-checking anything. --SHANITA

## 2021-12-03 NOTE — PROGRESS NOTES
Call pt: He needs to follow-up this next week to discuss labs- his diabetes is severly uncontrolled- glucose was 409 and HGM A1C is over 12.  He has multiple abnormal labs that need to be discussed.

## 2021-12-27 ENCOUNTER — OFFICE VISIT (OUTPATIENT)
Dept: FAMILY MEDICINE CLINIC | Facility: CLINIC | Age: 56
End: 2021-12-27

## 2021-12-27 VITALS
HEART RATE: 78 BPM | SYSTOLIC BLOOD PRESSURE: 100 MMHG | OXYGEN SATURATION: 99 % | DIASTOLIC BLOOD PRESSURE: 70 MMHG | TEMPERATURE: 98 F | BODY MASS INDEX: 31.32 KG/M2 | WEIGHT: 200 LBS

## 2021-12-27 DIAGNOSIS — R74.8 ELEVATED ALKALINE PHOSPHATASE LEVEL: ICD-10-CM

## 2021-12-27 DIAGNOSIS — E11.65 TYPE 2 DIABETES MELLITUS WITH HYPERGLYCEMIA, WITH LONG-TERM CURRENT USE OF INSULIN (HCC): Primary | ICD-10-CM

## 2021-12-27 DIAGNOSIS — E78.2 MIXED HYPERLIPIDEMIA: ICD-10-CM

## 2021-12-27 DIAGNOSIS — Z79.4 TYPE 2 DIABETES MELLITUS WITH HYPERGLYCEMIA, WITH LONG-TERM CURRENT USE OF INSULIN (HCC): Primary | ICD-10-CM

## 2021-12-27 PROCEDURE — 99214 OFFICE O/P EST MOD 30 MIN: CPT | Performed by: FAMILY MEDICINE

## 2021-12-27 NOTE — PROGRESS NOTES
Chief Complaint  Diabetes (Follow up on blood sugar .)    Subjective          Zechariah Fleming presents to [facility]  Discussed labs  Uncontrolled diabetes  Hyponatremia- likely from BG  Hyperlipidemia- counseled on diet and exercise  ROS: pt has had no recent fevers, soa, cough, vision changes, headaches, chest pains, palpitations, syncope, dizziness, nausea, vomiting, diarrhea, abdominal pain, rashes, joint pain, depression, anxiety, memory problems, leg swelling.    Pt has not had any meds 6 weeks prior and has been using meds for BG now      Objective   No Known Allergies  Immunization History   Administered Date(s) Administered   • COVID-19 (PFIZER) 03/22/2021, 04/13/2021   • Flu Vaccine Quad PF >36MO 10/02/2015, 10/04/2016, 10/26/2018, 12/05/2019, 11/04/2020   • Pneumococcal Polysaccharide (PPSV23) 08/04/2017       Vital Signs:   Vitals:    12/27/21 0825   BP: 100/70   Pulse: 78   Temp: 98 °F (36.7 °C)   SpO2: 99%   Weight: 90.7 kg (200 lb)       Physical Exam  Vitals reviewed.   Constitutional:       Appearance: Normal appearance. He is well-developed.   HENT:      Head: Normocephalic and atraumatic.      Right Ear: External ear normal.      Left Ear: External ear normal.      Mouth/Throat:      Pharynx: No oropharyngeal exudate.   Eyes:      Conjunctiva/sclera: Conjunctivae normal.      Pupils: Pupils are equal, round, and reactive to light.   Cardiovascular:      Rate and Rhythm: Normal rate and regular rhythm.      Pulses: Normal pulses.      Heart sounds: Normal heart sounds. No murmur heard.  No friction rub. No gallop.    Pulmonary:      Effort: Pulmonary effort is normal.      Breath sounds: Normal breath sounds. No wheezing or rhonchi.   Abdominal:      General: Bowel sounds are normal. There is no distension.      Palpations: Abdomen is soft.      Tenderness: There is no abdominal tenderness.   Musculoskeletal:         General: Normal range of motion.   Skin:      General: Skin is warm and dry.      Capillary Refill: Capillary refill takes less than 2 seconds.   Neurological:      General: No focal deficit present.      Mental Status: He is alert and oriented to person, place, and time.      Cranial Nerves: No cranial nerve deficit.   Psychiatric:         Mood and Affect: Mood and affect normal.         Behavior: Behavior normal.         Thought Content: Thought content normal.         Judgment: Judgment normal.        Result Review :   The following data was reviewed by: Robert García MD on 12/27/2021:  CMP    CMP 4/13/21 12/2/21   Glucose 360 (A) 409 (A)   BUN 12 10   Creatinine 0.77 0.79   eGFR Non African Am  101   Sodium 137 134 (A)   Potassium 4.5 4.4   Chloride 98 (A) 97 (A)   Calcium 9.5 9.6   Albumin 4.0 4.30   Total Bilirubin 0.29 0.4   Alkaline Phosphatase 116 124 (A)   AST (SGOT) 14 (A) 24   ALT (SGPT) 14 19   (A) Abnormal value            CBC    CBC 4/13/21 12/2/21   WBC 10.86 (A) 10.77   RBC 6.21 (A) 6.10 (A)   Hemoglobin 17.6 17.7   Hematocrit 53.4 (A) 53.6 (A)   MCV 86.0 87.9   MCH 28.3 29.0   MCHC 33.0 33.0   RDW 13.6 14.4   Platelets 308 274   (A) Abnormal value            Lipid Panel    Lipid Panel 4/13/21 12/2/21   Total Cholesterol  311 (A)   Total Cholesterol 241 (A)    Triglycerides 324 (A) 632 (A)   HDL Cholesterol 46 39 (A)   VLDL Cholesterol 65 (A) 123 (A)   LDL Cholesterol  130 (A) 149 (A)   LDL/HDL Ratio  3.73   (A) Abnormal value       Comments are available for some flowsheets but are not being displayed.           TSH    TSH 4/13/21 12/2/21   TSH 2.660 1.490           Most Recent A1C    HGBA1C Most Recent 12/2/21   Hemoglobin A1C 12.50 (A)   (A) Abnormal value            Microalbumin    Microalbumin 12/2/21   Microalbumin, Urine 12.5                     Assessment and Plan    Diagnoses and all orders for this visit:    1. Type 2 diabetes mellitus with hyperglycemia, with long-term current use of insulin (HCC) (Primary)  -     Comprehensive  Metabolic Panel; Future  -     Hemoglobin A1c; Future  -     Lipid Panel; Future    2. Mixed hyperlipidemia  -     Lipid Panel; Future    3. Elevated alkaline phosphatase level  -     US Abdomen Limited; Future  -     Gamma GT; Future            Follow Up   No follow-ups on file.  Patient was given instructions and counseling regarding his condition or for health maintenance advice. Please see specific information pulled into the AVS if appropriate.

## 2022-05-31 DIAGNOSIS — I10 PRIMARY HYPERTENSION: ICD-10-CM

## 2022-05-31 DIAGNOSIS — E78.2 MIXED HYPERLIPIDEMIA: ICD-10-CM

## 2022-06-02 RX ORDER — LISINOPRIL 5 MG/1
5 TABLET ORAL DAILY
Qty: 90 TABLET | Refills: 1 | OUTPATIENT
Start: 2022-06-02

## 2022-06-02 RX ORDER — PRAVASTATIN SODIUM 80 MG/1
80 TABLET ORAL DAILY
Qty: 90 TABLET | Refills: 1 | OUTPATIENT
Start: 2022-06-02

## 2022-06-04 DIAGNOSIS — Z79.4 TYPE 2 DIABETES MELLITUS WITH HYPERGLYCEMIA, WITH LONG-TERM CURRENT USE OF INSULIN: ICD-10-CM

## 2022-06-04 DIAGNOSIS — M19.90 ARTHRITIS: ICD-10-CM

## 2022-06-04 DIAGNOSIS — E11.65 TYPE 2 DIABETES MELLITUS WITH HYPERGLYCEMIA, WITH LONG-TERM CURRENT USE OF INSULIN: ICD-10-CM

## 2022-06-04 DIAGNOSIS — I10 PRIMARY HYPERTENSION: ICD-10-CM

## 2022-06-09 ENCOUNTER — OFFICE VISIT (OUTPATIENT)
Dept: FAMILY MEDICINE CLINIC | Facility: CLINIC | Age: 57
End: 2022-06-09

## 2022-06-09 VITALS
WEIGHT: 194 LBS | BODY MASS INDEX: 29.4 KG/M2 | DIASTOLIC BLOOD PRESSURE: 80 MMHG | HEART RATE: 99 BPM | HEIGHT: 68 IN | SYSTOLIC BLOOD PRESSURE: 120 MMHG | OXYGEN SATURATION: 97 % | TEMPERATURE: 96.1 F

## 2022-06-09 DIAGNOSIS — Z76.89 ENCOUNTER TO ESTABLISH CARE WITH NEW DOCTOR: Primary | ICD-10-CM

## 2022-06-09 DIAGNOSIS — E78.2 MIXED HYPERLIPIDEMIA: ICD-10-CM

## 2022-06-09 DIAGNOSIS — E11.42 DIABETIC PERIPHERAL NEUROPATHY: ICD-10-CM

## 2022-06-09 DIAGNOSIS — R74.8 ELEVATED ALKALINE PHOSPHATASE LEVEL: ICD-10-CM

## 2022-06-09 DIAGNOSIS — I10 PRIMARY HYPERTENSION: ICD-10-CM

## 2022-06-09 DIAGNOSIS — Z11.59 NEED FOR HEPATITIS C SCREENING TEST: ICD-10-CM

## 2022-06-09 DIAGNOSIS — Z79.4 TYPE 2 DIABETES MELLITUS WITH HYPERGLYCEMIA, WITH LONG-TERM CURRENT USE OF INSULIN: ICD-10-CM

## 2022-06-09 DIAGNOSIS — Z12.5 PROSTATE CANCER SCREENING: ICD-10-CM

## 2022-06-09 DIAGNOSIS — M19.90 ARTHRITIS: ICD-10-CM

## 2022-06-09 DIAGNOSIS — F17.218 CIGARETTE NICOTINE DEPENDENCE WITH OTHER NICOTINE-INDUCED DISORDER: ICD-10-CM

## 2022-06-09 DIAGNOSIS — E11.65 TYPE 2 DIABETES MELLITUS WITH HYPERGLYCEMIA, WITH LONG-TERM CURRENT USE OF INSULIN: ICD-10-CM

## 2022-06-09 DIAGNOSIS — Z53.20 LUNG CANCER SCREENING DECLINED BY PATIENT: ICD-10-CM

## 2022-06-09 LAB
ALBUMIN SERPL-MCNC: 4 G/DL (ref 3.5–5.2)
ALBUMIN UR-MCNC: 1.3 MG/DL
ALBUMIN/GLOB SERPL: 1.1 G/DL
ALP SERPL-CCNC: 80 U/L (ref 39–117)
ALT SERPL W P-5'-P-CCNC: 16 U/L (ref 1–41)
ANION GAP SERPL CALCULATED.3IONS-SCNC: 13.3 MMOL/L (ref 5–15)
AST SERPL-CCNC: 16 U/L (ref 1–40)
BASOPHILS # BLD AUTO: 0.1 10*3/MM3 (ref 0–0.2)
BASOPHILS NFR BLD AUTO: 1 % (ref 0–1.5)
BILIRUB SERPL-MCNC: 0.4 MG/DL (ref 0–1.2)
BUN SERPL-MCNC: 10 MG/DL (ref 6–20)
BUN/CREAT SERPL: 11.1 (ref 7–25)
CALCIUM SPEC-SCNC: 9.2 MG/DL (ref 8.6–10.5)
CHLORIDE SERPL-SCNC: 104 MMOL/L (ref 98–107)
CHOLEST SERPL-MCNC: 151 MG/DL (ref 0–200)
CO2 SERPL-SCNC: 20.7 MMOL/L (ref 22–29)
CREAT SERPL-MCNC: 0.9 MG/DL (ref 0.76–1.27)
CREAT UR-MCNC: 71.5 MG/DL
DEPRECATED RDW RBC AUTO: 42.2 FL (ref 37–54)
EGFRCR SERPLBLD CKD-EPI 2021: 100.2 ML/MIN/1.73
EOSINOPHIL # BLD AUTO: 0.54 10*3/MM3 (ref 0–0.4)
EOSINOPHIL NFR BLD AUTO: 5.1 % (ref 0.3–6.2)
ERYTHROCYTE [DISTWIDTH] IN BLOOD BY AUTOMATED COUNT: 13.8 % (ref 12.3–15.4)
GGT SERPL-CCNC: 32 U/L (ref 8–61)
GLOBULIN UR ELPH-MCNC: 3.5 GM/DL
GLUCOSE SERPL-MCNC: 240 MG/DL (ref 65–99)
HBA1C MFR BLD: 10.1 % (ref 4.8–5.6)
HCT VFR BLD AUTO: 53 % (ref 37.5–51)
HCV AB SER DONR QL: NORMAL
HDLC SERPL-MCNC: 36 MG/DL (ref 40–60)
HGB BLD-MCNC: 17.4 G/DL (ref 13–17.7)
IMM GRANULOCYTES # BLD AUTO: 0.04 10*3/MM3 (ref 0–0.05)
IMM GRANULOCYTES NFR BLD AUTO: 0.4 % (ref 0–0.5)
LDLC SERPL CALC-MCNC: 74 MG/DL (ref 0–100)
LDLC/HDLC SERPL: 1.8 {RATIO}
LYMPHOCYTES # BLD AUTO: 3.85 10*3/MM3 (ref 0.7–3.1)
LYMPHOCYTES NFR BLD AUTO: 36.7 % (ref 19.6–45.3)
MCH RBC QN AUTO: 28.2 PG (ref 26.6–33)
MCHC RBC AUTO-ENTMCNC: 32.8 G/DL (ref 31.5–35.7)
MCV RBC AUTO: 85.8 FL (ref 79–97)
MICROALBUMIN/CREAT UR: 18.2 MG/G
MONOCYTES # BLD AUTO: 0.78 10*3/MM3 (ref 0.1–0.9)
MONOCYTES NFR BLD AUTO: 7.4 % (ref 5–12)
NEUTROPHILS NFR BLD AUTO: 49.4 % (ref 42.7–76)
NEUTROPHILS NFR BLD AUTO: 5.19 10*3/MM3 (ref 1.7–7)
NRBC BLD AUTO-RTO: 0 /100 WBC (ref 0–0.2)
PLATELET # BLD AUTO: 251 10*3/MM3 (ref 140–450)
PMV BLD AUTO: 11.6 FL (ref 6–12)
POTASSIUM SERPL-SCNC: 4 MMOL/L (ref 3.5–5.2)
PROT SERPL-MCNC: 7.5 G/DL (ref 6–8.5)
PSA SERPL-MCNC: 0.41 NG/ML (ref 0–4)
RBC # BLD AUTO: 6.18 10*6/MM3 (ref 4.14–5.8)
SODIUM SERPL-SCNC: 138 MMOL/L (ref 136–145)
T4 FREE SERPL-MCNC: 1.04 NG/DL (ref 0.93–1.7)
TRIGL SERPL-MCNC: 251 MG/DL (ref 0–150)
TSH SERPL DL<=0.05 MIU/L-ACNC: 2.36 UIU/ML (ref 0.27–4.2)
VLDLC SERPL-MCNC: 41 MG/DL (ref 5–40)
WBC NRBC COR # BLD: 10.5 10*3/MM3 (ref 3.4–10.8)

## 2022-06-09 PROCEDURE — 84439 ASSAY OF FREE THYROXINE: CPT | Performed by: NURSE PRACTITIONER

## 2022-06-09 PROCEDURE — 83036 HEMOGLOBIN GLYCOSYLATED A1C: CPT | Performed by: NURSE PRACTITIONER

## 2022-06-09 PROCEDURE — 82570 ASSAY OF URINE CREATININE: CPT | Performed by: NURSE PRACTITIONER

## 2022-06-09 PROCEDURE — G0103 PSA SCREENING: HCPCS | Performed by: NURSE PRACTITIONER

## 2022-06-09 PROCEDURE — 80053 COMPREHEN METABOLIC PANEL: CPT | Performed by: NURSE PRACTITIONER

## 2022-06-09 PROCEDURE — 82977 ASSAY OF GGT: CPT | Performed by: NURSE PRACTITIONER

## 2022-06-09 PROCEDURE — 85025 COMPLETE CBC W/AUTO DIFF WBC: CPT | Performed by: NURSE PRACTITIONER

## 2022-06-09 PROCEDURE — 86803 HEPATITIS C AB TEST: CPT | Performed by: NURSE PRACTITIONER

## 2022-06-09 PROCEDURE — 84443 ASSAY THYROID STIM HORMONE: CPT | Performed by: NURSE PRACTITIONER

## 2022-06-09 PROCEDURE — 82043 UR ALBUMIN QUANTITATIVE: CPT | Performed by: NURSE PRACTITIONER

## 2022-06-09 PROCEDURE — 80061 LIPID PANEL: CPT | Performed by: NURSE PRACTITIONER

## 2022-06-09 PROCEDURE — 99214 OFFICE O/P EST MOD 30 MIN: CPT | Performed by: NURSE PRACTITIONER

## 2022-06-09 RX ORDER — ATENOLOL 25 MG/1
25 TABLET ORAL DAILY
Qty: 90 TABLET | Refills: 1 | Status: SHIPPED | OUTPATIENT
Start: 2022-06-09 | End: 2022-12-05

## 2022-06-09 RX ORDER — NAPROXEN 500 MG/1
500 TABLET ORAL DAILY
Qty: 90 TABLET | Refills: 1 | Status: SHIPPED | OUTPATIENT
Start: 2022-06-09 | End: 2022-12-05

## 2022-06-09 RX ORDER — NAPROXEN 500 MG/1
500 TABLET ORAL DAILY
Qty: 90 TABLET | Refills: 1 | OUTPATIENT
Start: 2022-06-09

## 2022-06-09 RX ORDER — ATENOLOL 25 MG/1
25 TABLET ORAL DAILY
Qty: 90 TABLET | Refills: 1 | OUTPATIENT
Start: 2022-06-09

## 2022-06-09 RX ORDER — SITAGLIPTIN 100 MG/1
TABLET, FILM COATED ORAL
Qty: 90 TABLET | Refills: 1 | OUTPATIENT
Start: 2022-06-09

## 2022-06-09 RX ORDER — LISINOPRIL 5 MG/1
5 TABLET ORAL DAILY
Qty: 90 TABLET | Refills: 1 | Status: SHIPPED | OUTPATIENT
Start: 2022-06-09 | End: 2022-12-05

## 2022-06-09 RX ORDER — EMPAGLIFLOZIN 25 MG/1
TABLET, FILM COATED ORAL
Qty: 90 TABLET | Refills: 1 | OUTPATIENT
Start: 2022-06-09

## 2022-06-09 RX ORDER — AMITRIPTYLINE HYDROCHLORIDE 10 MG/1
10 TABLET, FILM COATED ORAL NIGHTLY
Qty: 30 TABLET | Refills: 0 | Status: SHIPPED | OUTPATIENT
Start: 2022-06-09 | End: 2022-07-08

## 2022-06-09 RX ORDER — INSULIN DEGLUDEC INJECTION 100 U/ML
INJECTION, SOLUTION SUBCUTANEOUS
Qty: 18 PEN | Refills: 0 | Status: SHIPPED | OUTPATIENT
Start: 2022-06-09 | End: 2022-11-23

## 2022-06-09 RX ORDER — PRAVASTATIN SODIUM 80 MG/1
80 TABLET ORAL DAILY
Qty: 90 TABLET | Refills: 1 | Status: SHIPPED | OUTPATIENT
Start: 2022-06-09 | End: 2022-12-05

## 2022-06-09 NOTE — PROGRESS NOTES
Chief Complaint  Hypertension and Diabetes    Subjective            Zechariah Fleming presents to [facility]  History of Present Illness     Patient presents to the office today for medication refills and fasting labs.  Requesting to transfer care within the practice.    He has essential hypertension with dyslipidemia and type 2 diabetes mellitus.    Blood pressure is currently normotensive on exam today, 120/80.  He is prescribed atenolol 25 mg once daily, in addition to lisinopril 5 mg once daily.  He currently denies any chest pain, palpitations, headaches, dizziness, shortness of breath, or swelling in his legs.  He denies any complaints of dry cough with use of lisinopril.    He is prescribed pravastatin 80 mg nightly for treatment of dyslipidemia.  No complaints of myalgia with use.    Type 2 diabetes mellitus, currently not well controlled.  His last A1c was greater than 12%.  He is currently taking Jardiance, Januvia, and injecting Tresiba daily.  He is up to 54 units of his Tresiba.  He states that he often runs out of his Tresiba prescription because it is written for 36 units daily, but he has been taking 54 units for quite some time.  He believes the last time his A1c was elevated it was secondary to being out of his medication for more than 2 weeks.  He is hoping that his A1c is improved on this lab draw.  He denies polyuria, polydipsia, or polyphagia.  He does have neuropathy in his hands and feet.  He states that he used to take medication for the neuropathy but that was discontinued by his previous provider.  He has never tried amitriptyline to his knowledge.  He would be willing to try that for his neuropathy symptoms.  He does not check his blood sugar daily.    He takes naproxen for arthritis pain.  He states all of his joints ache.  He denies any nausea, vomiting, or abdominal pain.  He denies any melena.    He had a Cologuard test in July 2021 which was  negative.    Patient denies referral for CT chest low-dose for lung cancer screening.  He states that he has had 1 in the past -12 months ago.  Review of CT shows indeterminate noncalcified pulmonary nodule measuring up to 0.4 cm.  Findings consistent with a lung RADS category 2.  He is a chronic smoker.  He has a 40-pack-year history of smoking, averaging 1 pack/day for the past 40 to 41 years.  He currently denies any cough, wheeze, or shortness of breath.  He denies any abnormal sputum production.  Denies abnormal weight loss.    PHQ-2 Total Score: 0    Past Medical History:   Diagnosis Date   • Arthritis 6/9/2022   • Mixed hyperlipidemia 6/9/2022   • Primary hypertension 6/9/2022   • Type 2 diabetes mellitus with hyperglycemia, with long-term current use of insulin (HCC) 6/9/2022       No Known Allergies     Past Surgical History:   Procedure Laterality Date   • SHOULDER SURGERY Bilateral     2 on the RT and 2 on the LT        Social History     Tobacco Use   • Smoking status: Current Every Day Smoker     Packs/day: 1.00     Years: 41.00     Pack years: 41.00     Types: Cigarettes   • Smokeless tobacco: Never Used   Substance Use Topics   • Alcohol use: Never   • Drug use: Never       Family History   Problem Relation Age of Onset   • Developmental Disability Father    • Cancer Maternal Uncle    • Developmental Disability Maternal Grandmother    • Cancer Maternal Grandfather         Health Maintenance Due   Topic Date Due   • Hepatitis B (1 of 3 - Risk 3-dose series) Never done   • TDAP/TD VACCINES (1 - Tdap) Never done   • ZOSTER VACCINE (1 of 2) Never done   • Pneumococcal Vaccine 0-64 (2 - PCV) 08/04/2018   • HEPATITIS C SCREENING  Never done   • ANNUAL WELLNESS VISIT  Never done   • DIABETIC EYE EXAM  08/05/2021   • COVID-19 Vaccine (3 - Booster for Pfizer series) 09/13/2021   • HEMOGLOBIN A1C  06/02/2022        Current Outpatient Medications on File Prior to Visit   Medication Sig   • [DISCONTINUED]  "atenolol (Tenormin) 25 MG tablet Take 1 tablet by mouth Daily.   • [DISCONTINUED] empagliflozin (Jardiance) 25 MG tablet tablet Take 1 tablet by mouth Daily.   • [DISCONTINUED] insulin degludec (Tresiba FlexTouch) 100 UNIT/ML solution pen-injector injection Inject 36 Units under the skin into the appropriate area as directed Daily.   • [DISCONTINUED] lisinopril (PRINIVIL,ZESTRIL) 5 MG tablet Take 1 tablet by mouth Daily.   • [DISCONTINUED] naproxen (NAPROSYN) 500 MG tablet Take 1 tablet by mouth Daily.   • [DISCONTINUED] pravastatin (PRAVACHOL) 80 MG tablet Take 1 tablet by mouth Daily.   • [DISCONTINUED] SITagliptin (Januvia) 100 MG tablet Take 1 tablet by mouth Daily.     No current facility-administered medications on file prior to visit.       Immunization History   Administered Date(s) Administered   • COVID-19 (PFIZER) PURPLE CAP 03/22/2021, 04/13/2021   • Flu Vaccine Quad PF >36MO 10/02/2015, 10/04/2016, 10/26/2018, 12/05/2019, 11/04/2020   • Pneumococcal Polysaccharide (PPSV23) 08/04/2017       Review of Systems     Objective     /80   Pulse 99   Temp 96.1 °F (35.6 °C)   Ht 172.1 cm (67.75\")   Wt 88 kg (194 lb)   SpO2 97%   BMI 29.72 kg/m²       Physical Exam  Vitals reviewed.   Constitutional:       General: He is not in acute distress.     Appearance: He is well-developed and overweight.   HENT:      Head: Normocephalic and atraumatic.   Eyes:      General: No scleral icterus.     Extraocular Movements: Extraocular movements intact.      Conjunctiva/sclera: Conjunctivae normal.   Neck:      Thyroid: No thyroid mass, thyromegaly or thyroid tenderness.      Vascular: No carotid bruit.      Trachea: Trachea normal.   Cardiovascular:      Rate and Rhythm: Normal rate and regular rhythm.      Pulses: Normal pulses.      Heart sounds: No murmur heard.  Pulmonary:      Effort: Pulmonary effort is normal. No respiratory distress.      Breath sounds: Normal breath sounds. No wheezing, rhonchi or rales. "   Musculoskeletal:         General: Normal range of motion.      Cervical back: Normal range of motion and neck supple.      Right lower leg: No edema.      Left lower leg: No edema.   Lymphadenopathy:      Cervical: No cervical adenopathy.   Skin:     General: Skin is warm and dry.   Neurological:      Mental Status: He is alert and oriented to person, place, and time.   Psychiatric:         Mood and Affect: Mood and affect normal.         Behavior: Behavior normal.         Thought Content: Thought content normal.         Judgment: Judgment normal.         Result Review :     The following data was reviewed by: BIENVENIDO Ross on 06/09/2022:    CMP    CMP 12/2/21   Glucose 409 (A)   BUN 10   Creatinine 0.79   eGFR Non African Am 101   Sodium 134 (A)   Potassium 4.4   Chloride 97 (A)   Calcium 9.6   Albumin 4.30   Total Bilirubin 0.4   Alkaline Phosphatase 124 (A)   AST (SGOT) 24   ALT (SGPT) 19   (A) Abnormal value            CBC    CBC 12/2/21   WBC 10.77   RBC 6.10 (A)   Hemoglobin 17.7   Hematocrit 53.6 (A)   MCV 87.9   MCH 29.0   MCHC 33.0   RDW 14.4   Platelets 274   (A) Abnormal value            Lipid Panel    Lipid Panel 12/2/21   Total Cholesterol 311 (A)   Triglycerides 632 (A)   HDL Cholesterol 39 (A)   VLDL Cholesterol 123 (A)   LDL Cholesterol  149 (A)   LDL/HDL Ratio 3.73   (A) Abnormal value            TSH    TSH 12/2/21   TSH 1.490           A1C Last 3 Results    HGBA1C Last 3 Results 12/2/21   Hemoglobin A1C 12.50 (A)   (A) Abnormal value            Microalbumin    Microalbumin 12/2/21   Microalbumin, Urine 12.5           Data reviewed: GI studies :   SCANNED - COLOGUARD (07/14/2021)  CT Chest Low Dose Cancer Screening WO (05/03/2021 10:50)           Assessment and Plan      Diagnoses and all orders for this visit:    1. Encounter to establish care with new doctor (Primary)    2. Type 2 diabetes mellitus with hyperglycemia, with long-term current use of insulin (HCC)  -     empagliflozin  (Jardiance) 25 MG tablet tablet; Take 1 tablet by mouth Daily.  Dispense: 90 tablet; Refill: 1  -     insulin degludec (Tresiba FlexTouch) 100 UNIT/ML solution pen-injector injection; Inject 54 units by subcutaneous route once daily at the same time each time  Dispense: 18 pen; Refill: 0  -     SITagliptin (Januvia) 100 MG tablet; Take 1 tablet by mouth Daily.  Dispense: 90 tablet; Refill: 1  -     CBC Auto Differential  -     Comprehensive Metabolic Panel  -     Hemoglobin A1c  -     TSH+Free T4  -     Microalbumin / Creatinine Urine Ratio - Urine, Clean Catch    3. Diabetic peripheral neuropathy (HCC)  -     amitriptyline (ELAVIL) 10 MG tablet; Take 1 tablet by mouth Every Night.  Dispense: 30 tablet; Refill: 0    4. Primary hypertension  -     atenolol (Tenormin) 25 MG tablet; Take 1 tablet by mouth Daily.  Dispense: 90 tablet; Refill: 1  -     lisinopril (PRINIVIL,ZESTRIL) 5 MG tablet; Take 1 tablet by mouth Daily.  Dispense: 90 tablet; Refill: 1  -     CBC Auto Differential  -     Comprehensive Metabolic Panel  -     TSH+Free T4  -     Microalbumin / Creatinine Urine Ratio - Urine, Clean Catch    5. Mixed hyperlipidemia  -     pravastatin (PRAVACHOL) 80 MG tablet; Take 1 tablet by mouth Daily.  Dispense: 90 tablet; Refill: 1  -     Comprehensive Metabolic Panel  -     Lipid Panel    6. Arthritis  -     naproxen (NAPROSYN) 500 MG tablet; Take 1 tablet by mouth Daily.  Dispense: 90 tablet; Refill: 1    7. Need for hepatitis C screening test  -     Hepatitis C Antibody    8. Prostate cancer screening  -     PSA Screen    9. Cigarette nicotine dependence with other nicotine-induced disorder    10. Lung cancer screening declined by patient    11. Elevated alkaline phosphatase level  -     Gamma GT            Follow Up     Return in about 1 month (around 7/9/2022) for Medicare Wellness - follow up on amitriptyline.    Patient was given instructions and counseling regarding his condition or for health maintenance  advice. Please see specific information pulled into the AVS if appropriate.     Zechariah Villelaley  reports that he has been smoking cigarettes. He has a 41.00 pack-year smoking history. He has never used smokeless tobacco. I have educated him on the risk of diseases from using tobacco products such as cancer, COPD and heart disease.     I advised him to quit and he is not willing to quit.    I spent 3  minutes counseling the patient.

## 2022-06-09 NOTE — PROGRESS NOTES
Venipuncture Blood Specimen Collection  Venipuncture performed in left arm by Enid Fischer with good hemostasis. Patient tolerated the procedure well without complications.   06/09/22   Enid Fischer

## 2022-06-11 DIAGNOSIS — E11.65 TYPE 2 DIABETES MELLITUS WITH HYPERGLYCEMIA, WITH LONG-TERM CURRENT USE OF INSULIN: ICD-10-CM

## 2022-06-11 DIAGNOSIS — R79.89 ABNORMAL CBC: Primary | ICD-10-CM

## 2022-06-11 DIAGNOSIS — D75.1 POLYCYTHEMIA: ICD-10-CM

## 2022-06-11 DIAGNOSIS — Z79.4 TYPE 2 DIABETES MELLITUS WITH HYPERGLYCEMIA, WITH LONG-TERM CURRENT USE OF INSULIN: ICD-10-CM

## 2022-06-11 RX ORDER — SEMAGLUTIDE 1.34 MG/ML
INJECTION, SOLUTION SUBCUTANEOUS
Qty: 3 PEN | Refills: 0 | Status: SHIPPED | OUTPATIENT
Start: 2022-06-11 | End: 2022-09-02

## 2022-06-28 ENCOUNTER — APPOINTMENT (OUTPATIENT)
Dept: ONCOLOGY | Facility: HOSPITAL | Age: 57
End: 2022-06-28

## 2022-06-28 DIAGNOSIS — D75.1 POLYCYTHEMIA: Primary | ICD-10-CM

## 2022-07-01 ENCOUNTER — TELEPHONE (OUTPATIENT)
Dept: ONCOLOGY | Facility: HOSPITAL | Age: 57
End: 2022-07-01

## 2022-07-01 NOTE — TELEPHONE ENCOUNTER
Caller: Zechariah Fleming    Relationship to patient: Self    Best call back number: [phone]    Chief complaint: R/S     Type of visit: NEW PT APPT    Requested date: IN TWO WEEKS BUT NOT 7-12, 7-14    If rescheduling, when is the original appointment: 7-6     Additional notes:WAS EXPOSED TO COVID

## 2022-07-05 ENCOUNTER — TELEPHONE (OUTPATIENT)
Dept: ONCOLOGY | Facility: HOSPITAL | Age: 57
End: 2022-07-05

## 2022-07-06 ENCOUNTER — APPOINTMENT (OUTPATIENT)
Dept: ONCOLOGY | Facility: HOSPITAL | Age: 57
End: 2022-07-06

## 2022-07-08 DIAGNOSIS — E11.42 DIABETIC PERIPHERAL NEUROPATHY: ICD-10-CM

## 2022-07-08 RX ORDER — AMITRIPTYLINE HYDROCHLORIDE 10 MG/1
10 TABLET, FILM COATED ORAL NIGHTLY
Qty: 30 TABLET | Refills: 0 | Status: SHIPPED | OUTPATIENT
Start: 2022-07-08 | End: 2022-07-14 | Stop reason: SDUPTHER

## 2022-07-14 ENCOUNTER — OFFICE VISIT (OUTPATIENT)
Dept: FAMILY MEDICINE CLINIC | Facility: CLINIC | Age: 57
End: 2022-07-14

## 2022-07-14 VITALS
WEIGHT: 199 LBS | DIASTOLIC BLOOD PRESSURE: 70 MMHG | HEART RATE: 79 BPM | TEMPERATURE: 98.4 F | BODY MASS INDEX: 30.16 KG/M2 | HEIGHT: 68 IN | SYSTOLIC BLOOD PRESSURE: 130 MMHG | OXYGEN SATURATION: 98 %

## 2022-07-14 DIAGNOSIS — E11.65 TYPE 2 DIABETES MELLITUS WITH HYPERGLYCEMIA, WITH LONG-TERM CURRENT USE OF INSULIN: ICD-10-CM

## 2022-07-14 DIAGNOSIS — Z79.4 TYPE 2 DIABETES MELLITUS WITH HYPERGLYCEMIA, WITH LONG-TERM CURRENT USE OF INSULIN: ICD-10-CM

## 2022-07-14 DIAGNOSIS — Z00.00 MEDICARE ANNUAL WELLNESS VISIT, SUBSEQUENT: Primary | ICD-10-CM

## 2022-07-14 DIAGNOSIS — R91.1 NODULE OF UPPER LOBE OF RIGHT LUNG: ICD-10-CM

## 2022-07-14 DIAGNOSIS — Z71.85 VACCINE COUNSELING: ICD-10-CM

## 2022-07-14 DIAGNOSIS — F17.218 CIGARETTE NICOTINE DEPENDENCE WITH OTHER NICOTINE-INDUCED DISORDER: ICD-10-CM

## 2022-07-14 DIAGNOSIS — Z12.2 SCREENING FOR LUNG CANCER: ICD-10-CM

## 2022-07-14 DIAGNOSIS — E66.9 CLASS 1 OBESITY WITH SERIOUS COMORBIDITY AND BODY MASS INDEX (BMI) OF 30.0 TO 30.9 IN ADULT, UNSPECIFIED OBESITY TYPE: ICD-10-CM

## 2022-07-14 DIAGNOSIS — E11.42 DIABETIC PERIPHERAL NEUROPATHY: ICD-10-CM

## 2022-07-14 PROCEDURE — 1159F MED LIST DOCD IN RCRD: CPT | Performed by: NURSE PRACTITIONER

## 2022-07-14 PROCEDURE — 1170F FXNL STATUS ASSESSED: CPT | Performed by: NURSE PRACTITIONER

## 2022-07-14 PROCEDURE — G0439 PPPS, SUBSEQ VISIT: HCPCS | Performed by: NURSE PRACTITIONER

## 2022-07-14 PROCEDURE — 99214 OFFICE O/P EST MOD 30 MIN: CPT | Performed by: NURSE PRACTITIONER

## 2022-07-14 RX ORDER — AMITRIPTYLINE HYDROCHLORIDE 25 MG/1
25 TABLET, FILM COATED ORAL NIGHTLY
Qty: 90 TABLET | Refills: 0 | Status: SHIPPED | OUTPATIENT
Start: 2022-07-14 | End: 2022-09-21 | Stop reason: SDUPTHER

## 2022-07-14 NOTE — PROGRESS NOTES
The ABCs of the Annual Wellness Visit  Subsequent Medicare Wellness Visit    Chief Complaint   Patient presents with   • Medicare Wellness-subsequent      Subjective    History of Present Illness:  Zechariah Fleming is a 56 y.o. male who presents for a Subsequent Medicare Wellness Visit.    The following portions of the patient's history were reviewed and updated as appropriate: allergies, current medications, past family history, past medical history, past social history, past surgical history and problem list.    Compared to one year ago, the patient feels his physical health is the same.    Compared to one year ago, the patient feels his mental health is the same.    Recent Hospitalizations:  He was not admitted to the hospital during the last year.     He is also here today to follow up on his diabetes and recent change in medication for diabetic peripheral neuropathy.  At the time of his last visit 1 month ago he was prescribed amitriptyline 10 mg nightly for treatment of diabetic peripheral neuropathy.  He states that he can see some improvement in his symptoms, but things could be better.  He denies any adverse side effects with use of the medication.  Denies any daytime somnolence, although he does endorse chronic fatigue.  He states that he would like an increase in the dose.  Additionally, he recently initiated Ozempic due to uncontrolled diabetes, A1c greater than 10%.  He is still not checking his blood sugars daily.  He states that he does not like to stick his finger.  He is injecting the medication without any adverse side effects.  He denies any nausea, vomiting, abdominal pain, constipation, diarrhea, or dysphagia.  He is currently injecting Ozempic 0.25 mg weekly.  He had his eye exam on Tuesday.    His last CT scan of the chest was last year.  A 0.4 cm nodule was noted in the right lung.  This was noncalcified and indeterminate.  Previously, he declined CT scan for lung cancer screening; however,  recently the person that he knows, who is smoked many years less than he, was diagnosed with lung cancer and was given less than 3 years to live.  He is agreeable to get the CT scan.  He is still not ready to quit smoking.    Current Medical Providers:  Patient Care Team:  Brittni Birmingham APRN as PCP - General (Nurse Practitioner)    Outpatient Medications Prior to Visit   Medication Sig Dispense Refill   • atenolol (Tenormin) 25 MG tablet Take 1 tablet by mouth Daily. 90 tablet 1   • empagliflozin (Jardiance) 25 MG tablet tablet Take 1 tablet by mouth Daily. 90 tablet 1   • insulin degludec (Tresiba FlexTouch) 100 UNIT/ML solution pen-injector injection Inject 54 units by subcutaneous route once daily at the same time each time 18 pen 0   • lisinopril (PRINIVIL,ZESTRIL) 5 MG tablet Take 1 tablet by mouth Daily. 90 tablet 1   • naproxen (NAPROSYN) 500 MG tablet Take 1 tablet by mouth Daily. 90 tablet 1   • pravastatin (PRAVACHOL) 80 MG tablet Take 1 tablet by mouth Daily. 90 tablet 1   • Semaglutide,0.25 or 0.5MG/DOS, (Ozempic, 0.25 or 0.5 MG/DOSE,) 2 MG/1.5ML solution pen-injector Inject 0.25mg once weekly x4 weeks, then increase to 0.5mg weekly thereafter. 3 pen 0   • SITagliptin (Januvia) 100 MG tablet Take 1 tablet by mouth Daily. 90 tablet 1   • amitriptyline (ELAVIL) 10 MG tablet TAKE 1 TABLET BY MOUTH EVERY NIGHT 30 tablet 0     No facility-administered medications prior to visit.       No opioid medication identified on active medication list. I have reviewed chart for other potential  high risk medication/s and harmful drug interactions in the elderly.          Aspirin is not on active medication list.  Aspirin use is indicated based on review of current medical condition/s. Pros and cons of this therapy have been discussed with this patient. Benefits of this medication outweigh potential harm.  Patient has been instructed to start taking this medication. He states that he used to take a regular ASA  "and he would just touch his arm and bruise, so he quit taking those. He has never tried taking a baby ASA.     Patient Active Problem List   Diagnosis   • Type 2 diabetes mellitus with hyperglycemia, with long-term current use of insulin (HCC)   • Primary hypertension   • Mixed hyperlipidemia   • Arthritis     Advance Care Planning  Advance Directive is not on file.  ACP discussion was held with the patient during this visit. Patient does not have an advance directive, information provided.          Objective    Vitals:    07/14/22 0809   BP: 130/70   Pulse: 79   Temp: 98.4 °F (36.9 °C)   SpO2: 98%   Weight: 90.3 kg (199 lb)   Height: 171.5 cm (67.5\")     Estimated body mass index is 30.71 kg/m² as calculated from the following:    Height as of this encounter: 171.5 cm (67.5\").    Weight as of this encounter: 90.3 kg (199 lb).    BMI is >= 30 and <35. (Class 1 Obesity). The following options were offered after discussion;: weight loss educational material (shared in after visit summary), exercise counseling/recommendations and nutrition counseling/recommendations      Does the patient have evidence of cognitive impairment? No    Physical Exam  Vitals reviewed.   Constitutional:       General: He is not in acute distress.     Appearance: He is well-developed. He is obese.   HENT:      Head: Normocephalic and atraumatic.   Eyes:      General: No scleral icterus.     Extraocular Movements: Extraocular movements intact.      Conjunctiva/sclera: Conjunctivae normal.   Cardiovascular:      Rate and Rhythm: Normal rate and regular rhythm.      Pulses: Normal pulses.      Heart sounds: No murmur heard.  Pulmonary:      Effort: Pulmonary effort is normal. No respiratory distress.      Breath sounds: Normal breath sounds. No wheezing, rhonchi or rales.      Comments: Diminished bilaterally in the bases.  Musculoskeletal:         General: Normal range of motion.      Right lower leg: No edema.      Left lower leg: No edema. "   Skin:     General: Skin is warm and dry.   Neurological:      Mental Status: He is alert and oriented to person, place, and time.   Psychiatric:         Mood and Affect: Mood and affect normal.         Behavior: Behavior normal.         Thought Content: Thought content normal.         Judgment: Judgment normal.          Common labs    Common Labsle 12/2/21 12/2/21 12/2/21 12/2/21 12/2/21 6/9/22 6/9/22 6/9/22 6/9/22 6/9/22 6/9/22    0919 0919 0919 0919 0921 0825 0825 0825 0825 0825 0837   Glucose   409 (A)    240 (A)       BUN   10    10       Creatinine   0.79    0.90       eGFR Non African Am   101           Sodium   134 (A)    138       Potassium   4.4    4.0       Chloride   97 (A)    104       Calcium   9.6    9.2       Albumin   4.30    4.00       Total Bilirubin   0.4    0.4       Alkaline Phosphatase   124 (A)    80       AST (SGOT)   24    16       ALT (SGPT)   19    16       WBC 10.77     10.50        Hemoglobin 17.7     17.4        Hematocrit 53.6 (A)     53.0 (A)        Platelets 274     251        Total Cholesterol  311 (A)      151      Triglycerides  632 (A)      251 (A)      HDL Cholesterol  39 (A)      36 (A)      LDL Cholesterol   149 (A)      74      Hemoglobin A1C    12.50 (A)      10.10 (A)    Microalbumin, Urine     12.5      1.3   PSA         0.410     (A) Abnormal value            CT Chest Low Dose Cancer Screening WO (05/03/2021 10:50)      HEALTH RISK ASSESSMENT    Smoking Status:  Social History     Tobacco Use   Smoking Status Current Every Day Smoker   • Packs/day: 1.00   • Years: 41.00   • Pack years: 41.00   • Types: Cigarettes   Smokeless Tobacco Never Used     Alcohol Consumption:  Social History     Substance and Sexual Activity   Alcohol Use Never     Fall Risk Screen:    STEADI Fall Risk Assessment was completed, and patient is at LOW risk for falls.Assessment completed on:7/14/2022    Depression Screening:  PHQ-2/PHQ-9 Depression Screening 7/14/2022   Little Interest or Pleasure  in Doing Things 0-->not at all   Feeling Down, Depressed or Hopeless 0-->not at all   PHQ-9: Brief Depression Severity Measure Score 0       Health Habits and Functional and Cognitive Screening:  Functional & Cognitive Status 7/14/2022   Do you have difficulty preparing food and eating? No   Do you have difficulty bathing yourself, getting dressed or grooming yourself? No   Do you have difficulty using the toilet? No   Do you have difficulty moving around from place to place? No   Do you have trouble with steps or getting out of a bed or a chair? No   Current Diet Unhealthy Diet   Dental Exam Unknown   Eye Exam Up to date   Exercise (times per week) 0 times per week   Current Exercises Include Walking   Do you need help using the phone?  No   Are you deaf or do you have serious difficulty hearing?  No   Do you need help with transportation? No   Do you need help shopping? No   Do you need help preparing meals?  No   Do you need help with housework?  No   Do you need help with laundry? No   Do you need help taking your medications? No   Do you need help managing money? No   Do you ever drive or ride in a car without wearing a seat belt? Yes       Age-appropriate Screening Schedule:  Refer to the list below for future screening recommendations based on patient's age, sex and/or medical conditions. Orders for these recommended tests are listed in the plan section. The patient has been provided with a written plan.    Health Maintenance   Topic Date Due   • TDAP/TD VACCINES (1 - Tdap) Never done   • ZOSTER VACCINE (1 of 2) Never done   • INFLUENZA VACCINE  10/01/2022   • DIABETIC FOOT EXAM  12/02/2022   • HEMOGLOBIN A1C  12/09/2022   • LIPID PANEL  06/09/2023   • URINE MICROALBUMIN  06/09/2023   • DIABETIC EYE EXAM  07/12/2023              Assessment & Plan   CMS Preventative Services Quick Reference  Risk Factors Identified During Encounter  Cardiovascular Disease  Immunizations Discussed/Encouraged (specific  Immunizations; Tdap, Hepatitis B Vaccine/Series, Prevnar 20 (Pneumococcal 20-valent conjugate) and Shingrix  Inactivity/Sedentary  Obesity/Overweight   Tobacco Use/Dependance (use dotphrase .tobaccocessation for documentation)     The above risks/problems have been discussed with the patient.  Follow up actions/plans if indicated are seen below in the Assessment/Plan Section.  Pertinent information has been shared with the patient in the After Visit Summary.    Diagnoses and all orders for this visit:    1. Medicare annual wellness visit, subsequent (Primary)    2. Class 1 obesity with serious comorbidity and body mass index (BMI) of 30.0 to 30.9 in adult, unspecified obesity type  Comments:  Encouraged healthy diet - limit sweets/soda, breads/pastas - Exercise daily 30-60 minutes daily.     3. Vaccine counseling  Comments:  Recommended: TDaP, Shingrix, Prevnar 20, and Hepatitis B series.     4. Screening for lung cancer  -      CT Chest Low Dose Cancer Screening WO; Future    5. Cigarette nicotine dependence with other nicotine-induced disorder  -      CT Chest Low Dose Cancer Screening WO; Future    6. Nodule of upper lobe of right lung  -      CT Chest Low Dose Cancer Screening WO; Future    7. Type 2 diabetes mellitus with hyperglycemia, with long-term current use of insulin (HCC)    8. Diabetic peripheral neuropathy (HCC)  -     amitriptyline (ELAVIL) 25 MG tablet; Take 1 tablet by mouth Every Night.  Dispense: 90 tablet; Refill: 0        Follow Up:   Return in about 2 months (around 9/14/2022) for medication refills and fasting labs - recheck cmp, lipids, A1c.     He does not follow any particular diet, nor does he monitor his blood glucose.  I did encourage him to limit sweets and intake of soda.  Reduce intake of carbohydrates including pasta, breads, and potatoes.  Increase Ozempic to 0.5 mg weekly.  I will increase amitriptyline to 25 mg nightly for his diabetic peripheral neuropathy.  I encouraged daily  physical activity of 30 to 60 minutes for optimal health and weight.    Zechariah Fleming  reports that he has been smoking cigarettes. He has a 41.00 pack-year smoking history. He has never used smokeless tobacco.. I have educated him on the risk of diseases from using tobacco products such as cancer, COPD and heart disease.     I advised him to quit and he is not willing to quit.    I spent 3  minutes counseling the patient.    An After Visit Summary and PPPS were made available to the patient.

## 2022-07-20 ENCOUNTER — CONSULT (OUTPATIENT)
Dept: ONCOLOGY | Facility: HOSPITAL | Age: 57
End: 2022-07-20

## 2022-07-20 VITALS
BODY MASS INDEX: 30.31 KG/M2 | DIASTOLIC BLOOD PRESSURE: 66 MMHG | SYSTOLIC BLOOD PRESSURE: 99 MMHG | OXYGEN SATURATION: 94 % | HEART RATE: 84 BPM | TEMPERATURE: 98.4 F | WEIGHT: 196.43 LBS | RESPIRATION RATE: 16 BRPM

## 2022-07-20 DIAGNOSIS — D75.1 POLYCYTHEMIA: ICD-10-CM

## 2022-07-20 DIAGNOSIS — Z72.0 TOBACCO ABUSE: ICD-10-CM

## 2022-07-20 DIAGNOSIS — D72.10 EOSINOPHILIA, UNSPECIFIED TYPE: Primary | ICD-10-CM

## 2022-07-20 DIAGNOSIS — D72.829 LEUKOCYTOSIS, UNSPECIFIED TYPE: ICD-10-CM

## 2022-07-20 PROCEDURE — G0463 HOSPITAL OUTPT CLINIC VISIT: HCPCS | Performed by: NURSE PRACTITIONER

## 2022-07-20 PROCEDURE — 99214 OFFICE O/P EST MOD 30 MIN: CPT | Performed by: NURSE PRACTITIONER

## 2022-07-20 NOTE — PROGRESS NOTES
Chief Complaint  Abnormal CBC    Brittni Birmingham, A*  Brittni Birmingham APRN    Records Obtained:  Records of the patients history including those obtained from  pateint information and [facility] were reviewed and summarized in detail.    Reason for referral: polycythemia    Subjective          Zechariah Fleming presents to [facility] for polycythemia.     History of Present Illness   Mr. Zechariah Fleming presents for new patient evaluation by BIENVENIDO Cardenas.     Recent lab work dated on 6/9/2022 shows normal WBC count of 10.50, high normal hemoglobin level of 17.4. Hematocrit % elevated at 53.0 % and previously in December 0f 2021. As well the differential, the absolute lymphocyte count is 3850, and eosinophil count elevated at 540.  He also has elevated RBC count as well. He is a smoker of 1 PPD x 40 years. He reports he was told he has COPD. He also may have sleep apnea. I do see where he has a low dose CT screening to be scheduled. It appears his last colonoscopy was done in September 2015 and was normal. He denies any weight loss, night sweats, B symptoms or lymphadenopathy.         Oncology/Hematology History    No history exists.       Review of Systems   Constitutional: Negative.    HENT: Negative.    Eyes: Negative.    Respiratory: Negative.    Gastrointestinal: Positive for abdominal distention.   Endocrine: Negative.    Genitourinary: Negative.    Allergic/Immunologic: Negative.    Neurological: Negative.    Hematological: Negative.    Psychiatric/Behavioral: Negative.    All other systems reviewed and are negative.      Current Outpatient Medications on File Prior to Visit   Medication Sig Dispense Refill   • amitriptyline (ELAVIL) 25 MG tablet Take 1 tablet by mouth Every Night. 90 tablet 0   • atenolol (Tenormin) 25 MG tablet Take 1 tablet by mouth Daily. 90 tablet 1   • empagliflozin (Jardiance) 25 MG tablet tablet Take 1 tablet by mouth Daily. 90 tablet  1   • insulin degludec (Tresiba FlexTouch) 100 UNIT/ML solution pen-injector injection Inject 54 units by subcutaneous route once daily at the same time each time 18 pen 0   • lisinopril (PRINIVIL,ZESTRIL) 5 MG tablet Take 1 tablet by mouth Daily. 90 tablet 1   • naproxen (NAPROSYN) 500 MG tablet Take 1 tablet by mouth Daily. 90 tablet 1   • pravastatin (PRAVACHOL) 80 MG tablet Take 1 tablet by mouth Daily. 90 tablet 1   • Semaglutide,0.25 or 0.5MG/DOS, (Ozempic, 0.25 or 0.5 MG/DOSE,) 2 MG/1.5ML solution pen-injector Inject 0.25mg once weekly x4 weeks, then increase to 0.5mg weekly thereafter. 3 pen 0   • SITagliptin (Januvia) 100 MG tablet Take 1 tablet by mouth Daily. 90 tablet 1     No current facility-administered medications on file prior to visit.       No Known Allergies  Past Medical History:   Diagnosis Date   • Arthritis 6/9/2022   • Mixed hyperlipidemia 6/9/2022   • Primary hypertension 6/9/2022   • Type 2 diabetes mellitus with hyperglycemia, with long-term current use of insulin (HCC) 6/9/2022     Past Surgical History:   Procedure Laterality Date   • SHOULDER SURGERY Bilateral     2 on the RT and 2 on the LT     Social History     Socioeconomic History   • Marital status:    Tobacco Use   • Smoking status: Current Every Day Smoker     Packs/day: 1.00     Years: 41.00     Pack years: 41.00     Types: Cigarettes   • Smokeless tobacco: Never Used   Substance and Sexual Activity   • Alcohol use: Never   • Drug use: Never     Family History   Problem Relation Age of Onset   • Developmental Disability Father    • Cancer Maternal Uncle    • Developmental Disability Maternal Grandmother    • Cancer Maternal Grandfather      Immunization History   Administered Date(s) Administered   • COVID-19 (PFIZER) PURPLE CAP 03/22/2021, 04/13/2021   • Flu Vaccine Quad PF >36MO 10/02/2015, 10/04/2016, 10/26/2018, 12/05/2019, 11/04/2020   • Pneumococcal Polysaccharide (PPSV23) 08/04/2017       Objective   Physical  Exam  Vitals and nursing note reviewed.   Constitutional:       Appearance: He is obese.   HENT:      Head: Normocephalic.      Nose: Nose normal.      Mouth/Throat:      Mouth: Mucous membranes are moist.   Eyes:      Pupils: Pupils are equal, round, and reactive to light.   Cardiovascular:      Rate and Rhythm: Normal rate and regular rhythm.      Pulses: Normal pulses.      Heart sounds: Normal heart sounds.   Pulmonary:      Effort: No respiratory distress.      Breath sounds: Normal breath sounds.   Abdominal:      General: Bowel sounds are normal. There is distension.      Palpations: Abdomen is soft.   Musculoskeletal:         General: Normal range of motion.      Cervical back: Normal range of motion and neck supple.   Skin:     General: Skin is warm and dry.      Capillary Refill: Capillary refill takes less than 2 seconds.   Neurological:      General: No focal deficit present.      Mental Status: He is alert and oriented to person, place, and time.   Psychiatric:         Mood and Affect: Mood normal.         Thought Content: Thought content normal.         Judgment: Judgment normal.         Vitals:    07/20/22 1514   BP: 99/66   Pulse: 84   Resp: 16   Temp: 98.4 °F (36.9 °C)   SpO2: 94%   Weight: 89.1 kg (196 lb 6.9 oz)   PainSc: 0-No pain     ECOG score: 0         ECOG: (0) Fully Active - Able to Carry On All Pre-disease Performance Without Restriction  Fall Risk Assessment was completed, and patient is at low risk for falls.  PHQ-9 Total Score: 0       The patient is  experiencing fatigue. Fatigue score: 7    PT/OT Functional Screening: PT fx screen: No needs identified  Speech Functional Screening: Speech fx screen: No needs identified  Rehab to be ordered: Rehab to be ordered: No needs identified        Result Review :   The following data was reviewed by: BIENVENIDO Hampton on 07/20/2022:  Lab Results   Component Value Date    HGB 17.4 06/09/2022    HCT 53.0 (H) 06/09/2022    MCV 85.8  06/09/2022     06/09/2022    WBC 10.50 06/09/2022    NEUTROABS 5.19 06/09/2022    LYMPHSABS 3.85 (H) 06/09/2022    MONOSABS 0.78 06/09/2022    EOSABS 0.54 (H) 06/09/2022    BASOSABS 0.10 06/09/2022     Lab Results   Component Value Date    GLUCOSE 240 (H) 06/09/2022    BUN 10 06/09/2022    CREATININE 0.90 06/09/2022     06/09/2022    K 4.0 06/09/2022     06/09/2022    CO2 20.7 (L) 06/09/2022    CALCIUM 9.2 06/09/2022    PROTEINTOT 7.5 06/09/2022    ALBUMIN 4.00 06/09/2022    BILITOT 0.4 06/09/2022    ALKPHOS 80 06/09/2022    AST 16 06/09/2022    ALT 16 06/09/2022          Assessment and Plan    Diagnoses and all orders for this visit:    1. Eosinophilia, unspecified type (Primary)  -     Flow Cytometry, Blood; Future    2. Polycythemia  -     CBC & Differential; Future  -     JAK2 V617F Qual w/Reflex to CALR Mutation ([facility] only); Future  -     JAK2 V617F Qual w/Reflex to JAK2 Exon12 ([facility] only); Future  -     JAK2 V617F Qual w/Reflex to  Mutation ([facility] only); Future  -     Peripheral Blood Smear; Future  -     Comprehensive Metabolic Panel; Future  -     Lactate Dehydrogenase; Future    3. Leukocytosis, unspecified type  -     Flow Cytometry, Blood; Future    4. Tobacco abuse     Intermittent elevated % on his hematocrit level over 53%. Hemoglobin is normal. Differential with absolute lymphocytosis and eosinophils count.     Will check for any genetic mutations with LUZ 2 to evaluate for polycythemia vera, and check a flow cytometry to evaluate lymphocytosis and eosinophilia.     Discussed with the patient likely secondary polycythemia secondary to tobacco use and / or COPD / sleep apnea.     He is not interested in smoking cessation at this time. He has low dose scheduled for the future by his PCP.     He will follow up in 1 month with Dr. Broderick to discuss lab results.         Patient Follow Up: 1 month with Dr. Broderick for lab results and  addiditonal evaluation if needed.   Patient was given instructions and counseling regarding his condition or for health maintenance advice. Please see specific information pulled into the AVS if appropriate.     Fanny Maldonado, APRN    7/20/2022

## 2022-07-26 NOTE — PROGRESS NOTES
"   Progress Note      Patient Name: Zechariah Fleming Jr.   Patient ID: [id number]   Sex: Male   YOB: 1965    Primary Care Provider: Jaci Talbert MD   Referring Provider: Jaci Talbert MD    Visit Date: August 18, 2020    Provider: Robert García MD   Location: [facility]   Location Address: [address] [place] [address]   [place]  [address]   Location Phone: [phone]          Chief Complaint     med refills; been out of everything for more than a month  fasting labs       History Of Present Illness  Zechariah Fleming Jr. is a 55 year old /White male who presents for evaluation and treatment of:      pt has been off meds for 1 month, and has been off Cymbalta since March  needs refills and labs but pt refuses any further Cymbalta as does not want to take additional meds  HTN- controlled  hyperlipidemia- unknown control- need labs to assess  diabetic peripheral neuropathy pain controlled on lyrica- pt taking med as supposed to- pt not showing any signs of depression or addiction  DM II- unknown control- need labs to assess  pt tolerating meds well       Past Medical History  Disease Name Date Onset Notes   Diabetes --  --    Hyperlipemia --  --    Peripheral neuropathy --  --    Reflux --  --          Past Surgical History  Procedure Name Date Notes   Shoulder surgery --  --          Medication List  Name Date Started Instructions   atenolol 25 mg oral tablet 02/04/2020 take 1 tablet (25 mg) by oral route once daily for 90 days   BD Ultra-Fine Micro Pen Needle 32 gauge x 1/4\" miscellaneous needle 02/04/2020 use as directed for 30 days qd   Cymbalta 30 mg oral capsule,delayed release(DR/EC) 02/04/2020 take 1 capsule (30 mg) by oral route once daily for 30 days   Januvia 100 mg oral tablet 02/04/2020 take 1 tablet (100 mg) by oral route once daily for 30 days   Jardiance 25 mg oral tablet 02/04/2020 take 1 tablet (25 mg) by oral route once daily in the morning for 90 " Post-Op Assessment Note    CV Status:  Stable    Pain management: adequate     Mental Status:  Alert and awake   Hydration Status:  Euvolemic   PONV Controlled:  Controlled   Airway Patency:  Patent      Post Op Vitals Reviewed: Yes      Staff: Anesthesiologist     Post-op block assessment: catheter intact and no complications      No complications documented      BP      Temp      Pulse     Resp      SpO2      /74   Pulse 86   Temp 98 °F (36 7 °C) (Oral)   Resp 19   Ht 5' 5" (1 651 m)   Wt 98 kg (216 lb)   SpO2 98%   BMI 35 94 kg/m² "days   Lyrica 300 mg oral capsule 02/04/2020 1 po BID   mupirocin 2 % topical ointment 02/04/2020 apply a small amount to the affected area by topical route 3 times per day for 10 days   Naprosyn 500 mg oral tablet 02/04/2020 take 1 tablet (500 mg) by oral route 2 times per day with food for 30 days   pravastatin 80 mg oral tablet 02/04/2020 take 1 tablet (80 mg) by oral route once daily at bedtime for 90 days   Tresiba FlexTouch U-100 100 unit/mL (3 mL) subcutaneous insulin pen 02/04/2020 inject 36 units subcutaneously once daily for 30 days   Viagra 100 mg oral tablet 03/12/2020 take 1 tablet (100 mg) by oral route once daily as needed approximately 1 hour before sexual activity for 30 days         Allergy List  Allergen Name Date Reaction Notes   NO KNOWN DRUG ALLERGIES --  --  --          Social History  Finding Status Start/Stop Quantity Notes   Alcohol Former --/-- --  --    Tobacco Current every day --/-- 1.5 pk daily --          Immunizations  NameDate Admin Mfg Trade Name Lot Number Route Inj VIS Given VIS Publication   [id number]/05/2019 UNK Unknown TradeName [id number] NE NE 02/04/2020    Comments: [facility]   [id number]/26/2018 PMC FLUARIX [id number] IM  10/26/2018 08/07/2015   Comments: [facility] [id number]         Review of Systems  · Constitutional  o Denies  o : fatigue, fever  · Cardiovascular  o Denies  o : chest pain, palpitations  · Respiratory  o Denies  o : shortness of breath, cough  · Gastrointestinal  o Denies  o : nausea, vomiting, diarrhea  · Psychiatric  o Denies  o : anxiety, depression      Vitals  Date Time BP Position Site L\R Cuff Size HR RR TEMP (F) WT  HT  BMI kg/m2 BSA m2 O2 Sat        08/18/2020 08:02 /72 Sitting    110 - R  96.6 197lbs 2oz 5'  7.25\" 30.64 2.06 92 %          Physical Examination  · Constitutional  o Appearance  o : well developed, well-nourished, in no acute distress  · Respiratory  o Respiratory Effort  o : breathing unlabored  o Auscultation of Lungs  o : clear to " "ascultation  · Cardiovascular  o Heart  o :   § Auscultation of Heart  § : regular rate and rhythm  o Peripheral Vascular System  o :   § Extremities  § : no edema  · Gastrointestinal  o Abdomen  o : soft, non-tender, non-distended, + bowel sounds, no hepatosplenomegaly, no masses palpated  · Musculoskeletal  o General  o :   § General Musculoskeletal  § : No joint swelling or deformity., Muscle tone, strength, and development grossly normal.  · Neurologic  o Gait and Station  o :   § Gait Screening  § : normal gait  · Psychiatric  o Mood and Affect  o : mood normal, affect appropriate          Assessment  · Diabetes mellitus, type 2     250.00/E11.9  · Essential hypertension     401.9/I10  · Hyperlipidemia     272.4/E78.5  · Diabetic peripheral neuropathy       Type 2 diabetes mellitus with diabetic polyneuropathy     250.60/E11.42      Plan  · Orders  o CBC with Auto Diff [facility] ([id number]) - 250.00/E11.9 - 2020  o CMP [facility] ([id number]) - 250.00/E11.9 - 2020  o Hgb A1c [facility] ([id number]) - 250.00/E11.9 - 2020  o Lipid Panel [facility] ([id number]) - 250.00/E11.9 - 2020  o Thyroid Profile (THYII) - 250.00/E11.9 - 2020  o ACO-39: Current medications updated and reviewed () - - 2020  · Medications  o lisinopril 5 mg oral tablet   SIG: take 1 tablet (5 mg) by oral route once daily for 30 days   DISP: (30) tablets with 5 refills  Prescribed on 2020     o BD Ultra-Fine Micro Pen Needle 32 gauge x 1/4\" miscellaneous needle   SIG: use as directed for 30 days qd   DISP: (1) 100 ct box with 5 refills  Adjusted on 2020     o Januvia 100 mg oral tablet   SIG: take 1 tablet (100 mg) by oral route once daily for 30 days   DISP: (30) tablets with 5 refills  Adjusted on 2020     o Jardiance 25 mg oral tablet   SIG: take 1 tablet (25 mg) by oral route once daily in the morning for 30 days   DISP: (30) tablet with 5 refills  Adjusted on 2020     o Lyrica 300 mg oral capsule   SI po BID   DISP: " (60) capsules with 2 refills  Adjusted on 08/18/2020     o Naprosyn 500 mg oral tablet   SIG: take 1 tablet (500 mg) by oral route 2 times per day with food for 30 days   DISP: (60) tablets with 5 refills  Adjusted on 08/18/2020     o pravastatin 80 mg oral tablet   SIG: take 1 tablet (80 mg) by oral route once daily at bedtime for 30 days   DISP: (30) tablets with 5 refills  Adjusted on 08/18/2020     o Tresiba FlexTouch U-100 100 unit/mL (3 mL) subcutaneous insulin pen   SIG: inject 36 units subcutaneously once daily for 30 days   DISP: (4) Insulin pens with 5 refills  Adjusted on 08/18/2020     o Viagra 100 mg oral tablet   SIG: take 1 tablet (100 mg) by oral route once daily as needed approximately 1 hour before sexual activity for 30 days   DISP: (12) tablets with 5 refills  Adjusted on 08/18/2020     o atenolol 25 mg oral tablet   SIG: take 1 tablet (25 mg) by oral route once daily for 90 days   DISP: (90) tablet with 1 refills  Discontinued on 08/18/2020     o Cymbalta 30 mg oral capsule,delayed release(DR/EC)   SIG: take 1 capsule (30 mg) by oral route once daily for 30 days   DISP: (30) capsules with 2 refills  Discontinued on 08/18/2020     o mupirocin 2 % topical ointment   SIG: apply a small amount to the affected area by topical route 3 times per day for 10 days   DISP: (1) 22 gm tube with 2 refills  Discontinued on 08/18/2020     o Medications have been Reconciled  o Transition of Care or Provider Policy  · Instructions  o Advised that cheeses and other sources of dairy fats, animal fats, fast food, and the extras (candy, pasteries, pies, doughnuts and cookies) all contain LDL raising nutrients. Advised to increase fruits, vegetables, whole grains, and to monitor portion sizes.   o Patient was educated/instructed on their diagnosis, treatment and medications prior to discharge from the clinic today.            Electronically Signed by: Robert García MD -Author on August 18, 2020 08:25:01 AM

## 2022-08-04 DIAGNOSIS — E11.42 DIABETIC PERIPHERAL NEUROPATHY: ICD-10-CM

## 2022-08-04 RX ORDER — AMITRIPTYLINE HYDROCHLORIDE 10 MG/1
10 TABLET, FILM COATED ORAL NIGHTLY
Qty: 30 TABLET | Refills: 0 | OUTPATIENT
Start: 2022-08-04

## 2022-08-18 ENCOUNTER — TELEPHONE (OUTPATIENT)
Dept: FAMILY MEDICINE CLINIC | Facility: CLINIC | Age: 57
End: 2022-08-18

## 2022-08-18 NOTE — TELEPHONE ENCOUNTER
Called and spoke with pt he wants to have the test done.  I gave him the number to call and schedule.  He said he would get it done.

## 2022-08-25 ENCOUNTER — APPOINTMENT (OUTPATIENT)
Dept: ONCOLOGY | Facility: HOSPITAL | Age: 57
End: 2022-08-25

## 2022-09-02 DIAGNOSIS — Z79.4 TYPE 2 DIABETES MELLITUS WITH HYPERGLYCEMIA, WITH LONG-TERM CURRENT USE OF INSULIN: ICD-10-CM

## 2022-09-02 DIAGNOSIS — E11.65 TYPE 2 DIABETES MELLITUS WITH HYPERGLYCEMIA, WITH LONG-TERM CURRENT USE OF INSULIN: ICD-10-CM

## 2022-09-02 RX ORDER — SEMAGLUTIDE 1.34 MG/ML
INJECTION, SOLUTION SUBCUTANEOUS
Qty: 4.5 ML | Refills: 0 | Status: SHIPPED | OUTPATIENT
Start: 2022-09-02 | End: 2022-09-22

## 2022-09-19 ENCOUNTER — HOSPITAL ENCOUNTER (OUTPATIENT)
Dept: CT IMAGING | Facility: HOSPITAL | Age: 57
Discharge: HOME | End: 2022-09-19
Admitting: NURSE PRACTITIONER

## 2022-09-19 DIAGNOSIS — Z12.2 SCREENING FOR LUNG CANCER: ICD-10-CM

## 2022-09-19 DIAGNOSIS — R91.1 NODULE OF UPPER LOBE OF RIGHT LUNG: ICD-10-CM

## 2022-09-19 DIAGNOSIS — F17.218 CIGARETTE NICOTINE DEPENDENCE WITH OTHER NICOTINE-INDUCED DISORDER: ICD-10-CM

## 2022-09-19 PROCEDURE — 71271 CT THORAX LUNG CANCER SCR C-: CPT

## 2022-09-21 ENCOUNTER — OFFICE VISIT (OUTPATIENT)
Dept: FAMILY MEDICINE CLINIC | Facility: CLINIC | Age: 57
End: 2022-09-21

## 2022-09-21 VITALS
SYSTOLIC BLOOD PRESSURE: 110 MMHG | HEART RATE: 86 BPM | DIASTOLIC BLOOD PRESSURE: 70 MMHG | TEMPERATURE: 97.7 F | WEIGHT: 198 LBS | OXYGEN SATURATION: 95 % | BODY MASS INDEX: 30.55 KG/M2

## 2022-09-21 DIAGNOSIS — Z79.4 TYPE 2 DIABETES MELLITUS WITH HYPERGLYCEMIA, WITH LONG-TERM CURRENT USE OF INSULIN: Primary | ICD-10-CM

## 2022-09-21 DIAGNOSIS — I10 PRIMARY HYPERTENSION: ICD-10-CM

## 2022-09-21 DIAGNOSIS — E78.2 MIXED HYPERLIPIDEMIA: ICD-10-CM

## 2022-09-21 DIAGNOSIS — I25.10 CORONARY ARTERY CALCIFICATION SEEN ON COMPUTED TOMOGRAPHY: ICD-10-CM

## 2022-09-21 DIAGNOSIS — E11.42 DIABETIC PERIPHERAL NEUROPATHY: ICD-10-CM

## 2022-09-21 DIAGNOSIS — E11.65 TYPE 2 DIABETES MELLITUS WITH HYPERGLYCEMIA, WITH LONG-TERM CURRENT USE OF INSULIN: Primary | ICD-10-CM

## 2022-09-21 LAB
ALBUMIN SERPL-MCNC: 4.3 G/DL (ref 3.5–5.2)
ALBUMIN/GLOB SERPL: 1.3 G/DL
ALP SERPL-CCNC: 79 U/L (ref 39–117)
ALT SERPL W P-5'-P-CCNC: 15 U/L (ref 1–41)
ANION GAP SERPL CALCULATED.3IONS-SCNC: 13.2 MMOL/L (ref 5–15)
AST SERPL-CCNC: 15 U/L (ref 1–40)
BILIRUB SERPL-MCNC: 0.3 MG/DL (ref 0–1.2)
BUN SERPL-MCNC: 12 MG/DL (ref 6–20)
BUN/CREAT SERPL: 14.5 (ref 7–25)
CALCIUM SPEC-SCNC: 9.6 MG/DL (ref 8.6–10.5)
CHLORIDE SERPL-SCNC: 104 MMOL/L (ref 98–107)
CHOLEST SERPL-MCNC: 181 MG/DL (ref 0–200)
CO2 SERPL-SCNC: 22.8 MMOL/L (ref 22–29)
CREAT SERPL-MCNC: 0.83 MG/DL (ref 0.76–1.27)
EGFRCR SERPLBLD CKD-EPI 2021: 102.1 ML/MIN/1.73
GLOBULIN UR ELPH-MCNC: 3.3 GM/DL
GLUCOSE SERPL-MCNC: 133 MG/DL (ref 65–99)
HBA1C MFR BLD: 9 % (ref 4.8–5.6)
HDLC SERPL-MCNC: 54 MG/DL (ref 40–60)
LDLC SERPL CALC-MCNC: 84 MG/DL (ref 0–100)
LDLC/HDLC SERPL: 1.39 {RATIO}
POTASSIUM SERPL-SCNC: 4.3 MMOL/L (ref 3.5–5.2)
PROT SERPL-MCNC: 7.6 G/DL (ref 6–8.5)
SODIUM SERPL-SCNC: 140 MMOL/L (ref 136–145)
TRIGL SERPL-MCNC: 260 MG/DL (ref 0–150)
VLDLC SERPL-MCNC: 43 MG/DL (ref 5–40)

## 2022-09-21 PROCEDURE — 99214 OFFICE O/P EST MOD 30 MIN: CPT | Performed by: NURSE PRACTITIONER

## 2022-09-21 PROCEDURE — 83036 HEMOGLOBIN GLYCOSYLATED A1C: CPT | Performed by: NURSE PRACTITIONER

## 2022-09-21 PROCEDURE — 80061 LIPID PANEL: CPT | Performed by: NURSE PRACTITIONER

## 2022-09-21 PROCEDURE — 80053 COMPREHEN METABOLIC PANEL: CPT | Performed by: NURSE PRACTITIONER

## 2022-09-21 RX ORDER — AMITRIPTYLINE HYDROCHLORIDE 25 MG/1
25 TABLET, FILM COATED ORAL NIGHTLY
Qty: 90 TABLET | Refills: 0 | Status: SHIPPED | OUTPATIENT
Start: 2022-09-21 | End: 2022-12-27

## 2022-09-21 NOTE — PROGRESS NOTES
Venipuncture Blood Specimen Collection  Venipuncture performed in right arm  by Leandra Beckwith with good hemostasis. Patient tolerated the procedure well without complications.   09/21/22   Leandra Beckwith

## 2022-09-21 NOTE — PROGRESS NOTES
"Chief Complaint  Diabetes (Check up )    Subjective            Zechariah Fleming presents to [facility]  History of Present Illness     Patient presents to the office today for his 3-month diabetic checkup.  His A1c on 6/9/2022 was 10.10%.  9 months ago it was 12.5%.  He is currently taking Januvia 100 mg daily, in addition to Jardiance 25 mg daily.  He is injecting Tresiba 52 to 54 units daily, as well as Ozempic weekly.  He is not checking his blood sugar every day because he does not like to stick his finger; however, he is checking a few times per week and he has been 130-140 fairly consistently fasting.  He states he has been less than 150.  He denies any polyuria, polydipsia, or polyphagia.  He does endorse neuropathy symptoms; however, these have been significantly improved with the addition of amitriptyline 25 mg nightly.    He has not yet seen hematology for the abnormal CBC.  His wife came down with COVID when he was supposed to see them.  He has been rescheduled for next month and will see Dr. Pickens.    He had a CT of the chest for lung cancer screening on the 19th.  CT showed no evidence for acute intrathoracic abnormality, but there were moderate changes of emphysema and COPD noted.  He had some stable granulomatous disease.  There was also incidental finding of severe coronary artery calcifications.     He states that in the past he has had heart attack, \"but it was not the type of heart attack coming into my heart, it was the type leaving my heart\".  He denies any history of coronary artery stents.  He has not had any open heart surgery.  He is treated for hypertension and dyslipidemia.  He currently denies any chest pain, palpitations, or shortness of breath.  He does not have a cardiologist.  He is taking 80 mg of pravastatin currently.    Past Medical History:   Diagnosis Date   • Arthritis 6/9/2022   • Mixed hyperlipidemia 6/9/2022   • Primary hypertension " 6/9/2022   • Type 2 diabetes mellitus with hyperglycemia, with long-term current use of insulin (HCC) 6/9/2022       No Known Allergies     Past Surgical History:   Procedure Laterality Date   • SHOULDER SURGERY Bilateral     2 on the RT and 2 on the LT        Social History     Tobacco Use   • Smoking status: Current Every Day Smoker     Packs/day: 1.00     Years: 41.00     Pack years: 41.00     Types: Cigarettes   • Smokeless tobacco: Never Used   Substance Use Topics   • Alcohol use: Never   • Drug use: Never       Family History   Problem Relation Age of Onset   • Developmental Disability Father    • Cancer Maternal Uncle    • Developmental Disability Maternal Grandmother    • Cancer Maternal Grandfather         Health Maintenance Due   Topic Date Due   • Hepatitis B (1 of 3 - Risk 3-dose series) Never done   • TDAP/TD VACCINES (1 - Tdap) Never done   • ZOSTER VACCINE (1 of 2) Never done   • Pneumococcal Vaccine 0-64 (2 - PCV) 08/04/2018   • COVID-19 Vaccine (3 - Booster for Pfizer series) 06/08/2021        Current Outpatient Medications on File Prior to Visit   Medication Sig   • atenolol (Tenormin) 25 MG tablet Take 1 tablet by mouth Daily.   • empagliflozin (Jardiance) 25 MG tablet tablet Take 1 tablet by mouth Daily.   • insulin degludec (Tresiba FlexTouch) 100 UNIT/ML solution pen-injector injection Inject 54 units by subcutaneous route once daily at the same time each time   • lisinopril (PRINIVIL,ZESTRIL) 5 MG tablet Take 1 tablet by mouth Daily.   • naproxen (NAPROSYN) 500 MG tablet Take 1 tablet by mouth Daily.   • pravastatin (PRAVACHOL) 80 MG tablet Take 1 tablet by mouth Daily.   • Semaglutide,0.25 or 0.5MG/DOS, (Ozempic, 0.25 or 0.5 MG/DOSE,) 2 MG/1.5ML solution pen-injector INJECT 0.25MG SUBCUTANEOUS ONE DAY A WEEK FOR 4 WEEKS, THEN INCREASE TO 0.5MG WEEKLY THEREAFTER   • SITagliptin (Januvia) 100 MG tablet Take 1 tablet by mouth Daily.   • [DISCONTINUED] amitriptyline (ELAVIL) 25 MG tablet Take 1  tablet by mouth Every Night.     No current facility-administered medications on file prior to visit.       Immunization History   Administered Date(s) Administered   • COVID-19 (PFIZER) PURPLE CAP 03/22/2021, 04/13/2021   • Flu Vaccine Quad PF >36MO 10/02/2015, 10/04/2016, 10/26/2018, 12/05/2019, 11/04/2020   • Pneumococcal Polysaccharide (PPSV23) 08/04/2017       Review of Systems     Objective     /70   Pulse 86   Temp 97.7 °F (36.5 °C)   Wt 89.8 kg (198 lb)   SpO2 95%   BMI 30.55 kg/m²       Physical Exam  Vitals reviewed.   Constitutional:       General: He is not in acute distress.     Appearance: He is well-developed. He is obese.   HENT:      Head: Normocephalic and atraumatic.   Eyes:      General: No scleral icterus.     Extraocular Movements: Extraocular movements intact.      Conjunctiva/sclera: Conjunctivae normal.   Neck:      Thyroid: No thyroid mass, thyromegaly or thyroid tenderness.      Vascular: No carotid bruit.      Trachea: Trachea normal.   Cardiovascular:      Rate and Rhythm: Normal rate and regular rhythm.      Pulses: Normal pulses.      Heart sounds: No murmur heard.  Pulmonary:      Effort: Pulmonary effort is normal. No respiratory distress.      Breath sounds: Normal breath sounds. No wheezing, rhonchi or rales.   Musculoskeletal:         General: Normal range of motion.      Cervical back: Normal range of motion and neck supple.      Right lower leg: No edema.      Left lower leg: No edema.   Lymphadenopathy:      Cervical: No cervical adenopathy.   Skin:     General: Skin is warm and dry.   Neurological:      Mental Status: He is alert and oriented to person, place, and time.   Psychiatric:         Mood and Affect: Mood and affect normal.         Behavior: Behavior normal.         Thought Content: Thought content normal.         Judgment: Judgment normal.         Result Review :     The following data was reviewed by: BIENVENIDO Ross on 09/21/2022:    CMP    CMP  12/2/21 6/9/22   Glucose 409 (A) 240 (A)   BUN 10 10   Creatinine 0.79 0.90   eGFR Non African Am 101    Sodium 134 (A) 138   Potassium 4.4 4.0   Chloride 97 (A) 104   Calcium 9.6 9.2   Albumin 4.30 4.00   Total Bilirubin 0.4 0.4   Alkaline Phosphatase 124 (A) 80   AST (SGOT) 24 16   ALT (SGPT) 19 16   (A) Abnormal value            CBC    CBC 12/2/21 6/9/22   WBC 10.77 10.50   RBC 6.10 (A) 6.18 (A)   Hemoglobin 17.7 17.4   Hematocrit 53.6 (A) 53.0 (A)   MCV 87.9 85.8   MCH 29.0 28.2   MCHC 33.0 32.8   RDW 14.4 13.8   Platelets 274 251   (A) Abnormal value            Lipid Panel    Lipid Panel 12/2/21 6/9/22   Total Cholesterol 311 (A) 151   Triglycerides 632 (A) 251 (A)   HDL Cholesterol 39 (A) 36 (A)   VLDL Cholesterol 123 (A) 41 (A)   LDL Cholesterol  149 (A) 74   LDL/HDL Ratio 3.73 1.80   (A) Abnormal value            TSH    TSH 12/2/21 6/9/22   TSH 1.490 2.360           A1C Last 3 Results    HGBA1C Last 3 Results 12/2/21 6/9/22   Hemoglobin A1C 12.50 (A) 10.10 (A)   (A) Abnormal value            Microalbumin    Microalbumin 12/2/21 6/9/22   Microalbumin, Urine 12.5 1.3           PSA    PSA 6/9/22   PSA 0.410             Data reviewed: Radiologic studies :   CT Chest Low Dose Cancer Screening WO (09/19/2022 11:46)           Assessment and Plan      Diagnoses and all orders for this visit:    1. Type 2 diabetes mellitus with hyperglycemia, with long-term current use of insulin (HCC) (Primary)  -     Comprehensive Metabolic Panel  -     Lipid Panel  -     Hemoglobin A1c    2. Diabetic peripheral neuropathy (HCC)  -     amitriptyline (ELAVIL) 25 MG tablet; Take 1 tablet by mouth Every Night.  Dispense: 90 tablet; Refill: 0    3. Primary hypertension  -     Ambulatory Referral to Cardiology  -     Comprehensive Metabolic Panel  -     Lipid Panel    4. Mixed hyperlipidemia  -     Ambulatory Referral to Cardiology  -     Comprehensive Metabolic Panel  -     Lipid Panel    5. Coronary artery calcification seen on  computed tomography  -     Ambulatory Referral to Cardiology  -     Comprehensive Metabolic Panel  -     Lipid Panel            Follow Up     Return in about 3 months (around 12/21/2022) for medication refills and fasting labs.    Patient was given instructions and counseling regarding his condition or for health maintenance advice. Please see specific information pulled into the AVS if appropriate.

## 2022-09-22 DIAGNOSIS — Z79.4 TYPE 2 DIABETES MELLITUS WITH HYPERGLYCEMIA, WITH LONG-TERM CURRENT USE OF INSULIN: ICD-10-CM

## 2022-09-22 DIAGNOSIS — E11.65 TYPE 2 DIABETES MELLITUS WITH HYPERGLYCEMIA, WITH LONG-TERM CURRENT USE OF INSULIN: ICD-10-CM

## 2022-09-22 RX ORDER — SEMAGLUTIDE 1.34 MG/ML
1 INJECTION, SOLUTION SUBCUTANEOUS WEEKLY
Qty: 10.5 ML | Refills: 0 | Status: SHIPPED | OUTPATIENT
Start: 2022-09-22 | End: 2023-02-17

## 2022-10-03 ENCOUNTER — APPOINTMENT (OUTPATIENT)
Dept: ONCOLOGY | Facility: HOSPITAL | Age: 57
End: 2022-10-03

## 2022-10-14 ENCOUNTER — CLINICAL SUPPORT (OUTPATIENT)
Dept: FAMILY MEDICINE CLINIC | Facility: CLINIC | Age: 57
End: 2022-10-14

## 2022-10-14 DIAGNOSIS — D72.829 LEUKOCYTOSIS, UNSPECIFIED TYPE: ICD-10-CM

## 2022-10-14 DIAGNOSIS — D75.1 POLYCYTHEMIA: ICD-10-CM

## 2022-10-14 DIAGNOSIS — D75.1 POLYCYTHEMIA: Primary | ICD-10-CM

## 2022-10-14 PROCEDURE — 36415 COLL VENOUS BLD VENIPUNCTURE: CPT | Performed by: NURSE PRACTITIONER

## 2022-10-14 PROCEDURE — 36415 COLL VENOUS BLD VENIPUNCTURE: CPT

## 2022-10-14 NOTE — PROGRESS NOTES
Venipuncture Blood Specimen Collection  Venipuncture performed in left arm by Enid Fischer with good hemostasis. Patient tolerated the procedure well without complications.   10/14/22   Enid Fischer

## 2022-10-17 LAB — REF LAB TEST METHOD: NORMAL

## 2022-11-02 PROBLEM — I25.10 CORONARY ARTERY CALCIFICATION SEEN ON CT SCAN: Status: ACTIVE | Noted: 2022-11-02

## 2022-11-23 DIAGNOSIS — Z79.4 TYPE 2 DIABETES MELLITUS WITH HYPERGLYCEMIA, WITH LONG-TERM CURRENT USE OF INSULIN: ICD-10-CM

## 2022-11-23 DIAGNOSIS — E11.65 TYPE 2 DIABETES MELLITUS WITH HYPERGLYCEMIA, WITH LONG-TERM CURRENT USE OF INSULIN: ICD-10-CM

## 2022-11-23 RX ORDER — SEMAGLUTIDE 1.34 MG/ML
INJECTION, SOLUTION SUBCUTANEOUS
Qty: 4.5 ML | Refills: 0 | Status: SHIPPED | OUTPATIENT
Start: 2022-11-23 | End: 2022-12-30

## 2022-11-23 RX ORDER — INSULIN DEGLUDEC INJECTION 100 U/ML
INJECTION, SOLUTION SUBCUTANEOUS
Qty: 54 ML | Refills: 0 | Status: SHIPPED | OUTPATIENT
Start: 2022-11-23 | End: 2023-02-16

## 2022-12-04 DIAGNOSIS — E78.2 MIXED HYPERLIPIDEMIA: ICD-10-CM

## 2022-12-04 DIAGNOSIS — E11.65 TYPE 2 DIABETES MELLITUS WITH HYPERGLYCEMIA, WITH LONG-TERM CURRENT USE OF INSULIN: ICD-10-CM

## 2022-12-04 DIAGNOSIS — I10 PRIMARY HYPERTENSION: ICD-10-CM

## 2022-12-04 DIAGNOSIS — Z79.4 TYPE 2 DIABETES MELLITUS WITH HYPERGLYCEMIA, WITH LONG-TERM CURRENT USE OF INSULIN: ICD-10-CM

## 2022-12-04 DIAGNOSIS — M19.90 ARTHRITIS: ICD-10-CM

## 2022-12-05 RX ORDER — LISINOPRIL 5 MG/1
5 TABLET ORAL DAILY
Qty: 90 TABLET | Refills: 0 | Status: SHIPPED | OUTPATIENT
Start: 2022-12-05 | End: 2022-12-30 | Stop reason: SDUPTHER

## 2022-12-05 RX ORDER — ATENOLOL 25 MG/1
25 TABLET ORAL DAILY
Qty: 90 TABLET | Refills: 0 | Status: SHIPPED | OUTPATIENT
Start: 2022-12-05 | End: 2022-12-30 | Stop reason: SDUPTHER

## 2022-12-05 RX ORDER — SITAGLIPTIN 100 MG/1
TABLET, FILM COATED ORAL
Qty: 90 TABLET | Refills: 0 | Status: SHIPPED | OUTPATIENT
Start: 2022-12-05 | End: 2023-03-13

## 2022-12-05 RX ORDER — EMPAGLIFLOZIN 25 MG/1
TABLET, FILM COATED ORAL
Qty: 90 TABLET | Refills: 0 | Status: SHIPPED | OUTPATIENT
Start: 2022-12-05 | End: 2023-03-13

## 2022-12-05 RX ORDER — PRAVASTATIN SODIUM 80 MG/1
80 TABLET ORAL DAILY
Qty: 90 TABLET | Refills: 0 | Status: SHIPPED | OUTPATIENT
Start: 2022-12-05 | End: 2022-12-30 | Stop reason: SDUPTHER

## 2022-12-05 RX ORDER — NAPROXEN 500 MG/1
500 TABLET ORAL DAILY
Qty: 90 TABLET | Refills: 0 | Status: SHIPPED | OUTPATIENT
Start: 2022-12-05 | End: 2022-12-30 | Stop reason: SDUPTHER

## 2022-12-26 DIAGNOSIS — E11.42 DIABETIC PERIPHERAL NEUROPATHY: ICD-10-CM

## 2022-12-27 RX ORDER — AMITRIPTYLINE HYDROCHLORIDE 25 MG/1
25 TABLET, FILM COATED ORAL NIGHTLY
Qty: 30 TABLET | Refills: 0 | Status: SHIPPED | OUTPATIENT
Start: 2022-12-27 | End: 2022-12-30 | Stop reason: SDUPTHER

## 2022-12-30 ENCOUNTER — OFFICE VISIT (OUTPATIENT)
Dept: FAMILY MEDICINE CLINIC | Facility: CLINIC | Age: 57
End: 2022-12-30

## 2022-12-30 VITALS
BODY MASS INDEX: 29.78 KG/M2 | TEMPERATURE: 96.8 F | DIASTOLIC BLOOD PRESSURE: 68 MMHG | OXYGEN SATURATION: 99 % | WEIGHT: 193 LBS | HEART RATE: 94 BPM | SYSTOLIC BLOOD PRESSURE: 100 MMHG

## 2022-12-30 DIAGNOSIS — Z79.4 TYPE 2 DIABETES MELLITUS WITH HYPERGLYCEMIA, WITH LONG-TERM CURRENT USE OF INSULIN: Primary | ICD-10-CM

## 2022-12-30 DIAGNOSIS — E11.65 TYPE 2 DIABETES MELLITUS WITH HYPERGLYCEMIA, WITH LONG-TERM CURRENT USE OF INSULIN: Primary | ICD-10-CM

## 2022-12-30 DIAGNOSIS — I10 PRIMARY HYPERTENSION: ICD-10-CM

## 2022-12-30 DIAGNOSIS — E78.2 MIXED HYPERLIPIDEMIA: ICD-10-CM

## 2022-12-30 DIAGNOSIS — Z23 INFLUENZA VACCINATION ADMINISTERED AT CURRENT VISIT: ICD-10-CM

## 2022-12-30 DIAGNOSIS — E11.42 DIABETIC PERIPHERAL NEUROPATHY: ICD-10-CM

## 2022-12-30 DIAGNOSIS — Z23 NEED FOR PNEUMOCOCCAL VACCINATION: ICD-10-CM

## 2022-12-30 DIAGNOSIS — E11.9 ENCOUNTER FOR DIABETIC FOOT EXAM: ICD-10-CM

## 2022-12-30 DIAGNOSIS — M19.90 ARTHRITIS: ICD-10-CM

## 2022-12-30 LAB — HBA1C MFR BLD: 8.9 % (ref 4.8–5.6)

## 2022-12-30 PROCEDURE — 80053 COMPREHEN METABOLIC PANEL: CPT | Performed by: NURSE PRACTITIONER

## 2022-12-30 PROCEDURE — 80061 LIPID PANEL: CPT | Performed by: NURSE PRACTITIONER

## 2022-12-30 PROCEDURE — 90677 PCV20 VACCINE IM: CPT | Performed by: NURSE PRACTITIONER

## 2022-12-30 PROCEDURE — 83036 HEMOGLOBIN GLYCOSYLATED A1C: CPT | Performed by: NURSE PRACTITIONER

## 2022-12-30 PROCEDURE — G0008 ADMIN INFLUENZA VIRUS VAC: HCPCS | Performed by: NURSE PRACTITIONER

## 2022-12-30 PROCEDURE — 99214 OFFICE O/P EST MOD 30 MIN: CPT | Performed by: NURSE PRACTITIONER

## 2022-12-30 PROCEDURE — G0009 ADMIN PNEUMOCOCCAL VACCINE: HCPCS | Performed by: NURSE PRACTITIONER

## 2022-12-30 PROCEDURE — 90686 IIV4 VACC NO PRSV 0.5 ML IM: CPT | Performed by: NURSE PRACTITIONER

## 2022-12-30 RX ORDER — ATENOLOL 25 MG/1
25 TABLET ORAL DAILY
Qty: 90 TABLET | Refills: 0 | Status: SHIPPED | OUTPATIENT
Start: 2022-12-30

## 2022-12-30 RX ORDER — NAPROXEN 500 MG/1
500 TABLET ORAL DAILY
Qty: 90 TABLET | Refills: 0 | Status: SHIPPED | OUTPATIENT
Start: 2022-12-30

## 2022-12-30 RX ORDER — PRAVASTATIN SODIUM 80 MG/1
80 TABLET ORAL DAILY
Qty: 90 TABLET | Refills: 0 | Status: SHIPPED | OUTPATIENT
Start: 2022-12-30

## 2022-12-30 RX ORDER — LISINOPRIL 5 MG/1
5 TABLET ORAL DAILY
Qty: 90 TABLET | Refills: 0 | Status: SHIPPED | OUTPATIENT
Start: 2022-12-30

## 2022-12-30 RX ORDER — AMITRIPTYLINE HYDROCHLORIDE 25 MG/1
25 TABLET, FILM COATED ORAL NIGHTLY
Qty: 90 TABLET | Refills: 0 | Status: SHIPPED | OUTPATIENT
Start: 2022-12-30

## 2022-12-30 NOTE — PROGRESS NOTES
Venipuncture Blood Specimen Collection  Venipuncture performed in left arm by Enid Muñoz with good hemostasis. Patient tolerated the procedure well without complications.   12/30/22   Enid Muñoz

## 2022-12-30 NOTE — PROGRESS NOTES
Chief Complaint  Diabetes (Follow up)    Subjective            Zechariah Fleming presents to [facility]  History of Present Illness     Zechariah comes into the office today to follow-up on his diabetes and for medication refills.    He is currently prescribed Tresiba 54 units daily, Januvia 100 mg daily, Jardiance 25 mg daily, and Ozempic 1 mg weekly.  He is uncertain of what his blood sugar is fasting as he does not check his blood sugar.  Denies polyuria, polydipsia, polyphagia.  Denies any blurred vision.  He does have neuropathy symptoms to include burning and tingling sensation of the feet.  Elavil has only been minimally efficacious.  He does not feel like the dose should be increased because he does sometimes wake up feeling tired.    His blood pressure is 100/68 today.  He is prescribed atenolol and lisinopril.  He does not check his blood pressure at home.  He denies any symptoms of hypotension.  He is not having any chest pain, palpitations, headaches, dizziness, lightheadedness, presyncope or syncope, shortness of breath, or lower extremity edema.    He takes pravastatin for mixed hyperlipidemia.  No complaints of myalgia with use.    He was referred to hematology/oncology.  Reportedly they told him that they do not take his insurance, so he lacks follow-up with them.  He did have his initial consultation, however.  He has not seen cardiology as referred.  He was referred to cardiology secondary to coronary artery calcifications noted on CT of the chest.      Past Medical History:   Diagnosis Date   • Arthritis 6/9/2022   • Mixed hyperlipidemia 6/9/2022   • Primary hypertension 6/9/2022   • Type 2 diabetes mellitus with hyperglycemia, with long-term current use of insulin (HCC) 6/9/2022       No Known Allergies     Past Surgical History:   Procedure Laterality Date   • SHOULDER SURGERY Bilateral     2 on the RT and 2 on the LT        Social History     Tobacco Use   • Smoking  status: Every Day     Packs/day: 1.00     Years: 41.00     Pack years: 41.00     Types: Cigarettes   • Smokeless tobacco: Never   Substance Use Topics   • Alcohol use: Never   • Drug use: Never       Family History   Problem Relation Age of Onset   • Developmental Disability Father    • Cancer Maternal Uncle    • Developmental Disability Maternal Grandmother    • Cancer Maternal Grandfather         Health Maintenance Due   Topic Date Due   • Hepatitis B (1 of 3 - 3-dose series) Never done   • TDAP/TD VACCINES (1 - Tdap) Never done   • ZOSTER VACCINE (1 of 2) Never done   • COVID-19 Vaccine (3 - Booster for Pfizer series) 06/08/2021        Current Outpatient Medications on File Prior to Visit   Medication Sig   • insulin degludec (Tresiba FlexTouch) 100 UNIT/ML solution pen-injector injection INJECT 54 UNITS UNDER THE SKIN ONCE DAILY AT THE SAME TIME EACH DAY   • Januvia 100 MG tablet TAKE 1 TABLET BY MOUTH DAILY   • Jardiance 25 MG tablet tablet TAKE 1 TABLET BY MOUTH DAILY   • Semaglutide, 1 MG/DOSE, (Ozempic, 1 MG/DOSE,) 2 MG/1.5ML solution pen-injector Inject 1 mg under the skin into the appropriate area as directed 1 (One) Time Per Week.   • [DISCONTINUED] amitriptyline (ELAVIL) 25 MG tablet TAKE 1 TABLET BY MOUTH EVERY NIGHT   • [DISCONTINUED] atenolol (TENORMIN) 25 MG tablet TAKE 1 TABLET BY MOUTH DAILY   • [DISCONTINUED] lisinopril (PRINIVIL,ZESTRIL) 5 MG tablet TAKE 1 TABLET BY MOUTH DAILY   • [DISCONTINUED] naproxen (NAPROSYN) 500 MG tablet TAKE 1 TABLET BY MOUTH DAILY   • [DISCONTINUED] pravastatin (PRAVACHOL) 80 MG tablet TAKE 1 TABLET BY MOUTH DAILY   • [DISCONTINUED] Semaglutide,0.25 or 0.5MG/DOS, (Ozempic, 0.25 or 0.5 MG/DOSE,) 2 MG/1.5ML solution pen-injector INJECT 0.25 MG UNDER THE SKIN ONCE A WEEK FOR 4 WEEKS, THEN INCREASE TO 0.5 MG WEEKLY THERE AFTER     No current facility-administered medications on file prior to visit.       Immunization History   Administered Date(s) Administered   • COVID-19  (PFIZER) PURPLE CAP 03/22/2021, 04/13/2021   • Flu Vaccine Quad PF >36MO 10/02/2015, 10/04/2016, 10/26/2018, 12/05/2019, 11/04/2020   • FluLaval/Fluzone >6mos 12/30/2022   • Pneumococcal Conjugate 20-Valent (PCV20) 12/30/2022   • Pneumococcal Polysaccharide (PPSV23) 08/04/2017       Review of Systems     Objective     /68   Pulse 94   Temp 96.8 °F (36 °C)   Wt 87.5 kg (193 lb)   SpO2 99%   BMI 29.78 kg/m²       Physical Exam  Vitals reviewed.   Constitutional:       General: He is not in acute distress.     Appearance: He is well-developed and overweight.   HENT:      Head: Normocephalic and atraumatic.   Eyes:      General: No scleral icterus.     Extraocular Movements: Extraocular movements intact.      Conjunctiva/sclera: Conjunctivae normal.   Neck:      Thyroid: No thyroid mass, thyromegaly or thyroid tenderness.      Vascular: No carotid bruit.      Trachea: Trachea normal.   Cardiovascular:      Rate and Rhythm: Normal rate and regular rhythm.      Pulses: Normal pulses.           Dorsalis pedis pulses are 2+ on the right side and 2+ on the left side.        Posterior tibial pulses are 2+ on the right side and 2+ on the left side.      Heart sounds: No murmur heard.  Pulmonary:      Effort: Pulmonary effort is normal. No respiratory distress.      Breath sounds: Normal breath sounds. No wheezing, rhonchi or rales.   Musculoskeletal:         General: Normal range of motion.      Cervical back: Normal range of motion and neck supple.      Right lower leg: No edema.      Left lower leg: No edema.      Right foot: Normal range of motion. No deformity or bunion.      Left foot: Normal range of motion. No deformity or bunion.   Feet:      Right foot:      Protective Sensation: 9 sites tested. 9 sites sensed.      Skin integrity: Dry skin present. No ulcer or blister.      Toenail Condition: Right toenails are normal.      Left foot:      Protective Sensation: 9 sites tested. 9 sites sensed.      Skin  integrity: Dry skin present. No ulcer or blister.      Toenail Condition: Left toenails are normal.      Comments:      Lymphadenopathy:      Cervical: No cervical adenopathy.   Skin:     General: Skin is warm and dry.   Neurological:      Mental Status: He is alert and oriented to person, place, and time.   Psychiatric:         Mood and Affect: Mood and affect normal.         Behavior: Behavior normal.         Thought Content: Thought content normal.         Judgment: Judgment normal.         Result Review :     The following data was reviewed by: BIENVENIDO Ross on 12/30/2022:    CMP    CMP 6/9/22 9/21/22   Glucose 240 (A) 133 (A)   BUN 10 12   Creatinine 0.90 0.83   eGFR 100.2 102.1   Sodium 138 140   Potassium 4.0 4.3   Chloride 104 104   Calcium 9.2 9.6   Total Protein 7.5 7.6   Albumin 4.00 4.30   Globulin 3.5 3.3   Total Bilirubin 0.4 0.3   Alkaline Phosphatase 80 79   AST (SGOT) 16 15   ALT (SGPT) 16 15   Albumin/Globulin Ratio 1.1 1.3   BUN/Creatinine Ratio 11.1 14.5   Anion Gap 13.3 13.2   (A) Abnormal value       Comments are available for some flowsheets but are not being displayed.           CBC    CBC 6/9/22   WBC 10.50   RBC 6.18 (A)   Hemoglobin 17.4   Hematocrit 53.0 (A)   MCV 85.8   MCH 28.2   MCHC 32.8   RDW 13.8   Platelets 251   (A) Abnormal value            Lipid Panel    Lipid Panel 6/9/22 9/21/22   Total Cholesterol 151 181   Triglycerides 251 (A) 260 (A)   HDL Cholesterol 36 (A) 54   VLDL Cholesterol 41 (A) 43 (A)   LDL Cholesterol  74 84   LDL/HDL Ratio 1.80 1.39   (A) Abnormal value            TSH    TSH 6/9/22   TSH 2.360           A1C Last 3 Results    HGBA1C Last 3 Results 6/9/22 9/21/22   Hemoglobin A1C 10.10 (A) 9.00 (A)   (A) Abnormal value            Microalbumin    Microalbumin 6/9/22   Microalbumin, Urine 1.3           PSA    PSA 6/9/22   PSA 0.410             Data reviewed: Radiologic studies : CT chest and GI studies Cologuard   CT Chest Low Dose Cancer Screening WO  (09/19/2022 11:46)  [facility]         Assessment and Plan      Diagnoses and all orders for this visit:    1. Type 2 diabetes mellitus with hyperglycemia, with long-term current use of insulin (HCC) (Primary)  -     Comprehensive Metabolic Panel  -     Lipid Panel  -     Hemoglobin A1c    2. Diabetic peripheral neuropathy (HCC)  -     amitriptyline (ELAVIL) 25 MG tablet; Take 1 tablet by mouth Every Night.  Dispense: 90 tablet; Refill: 0    3. Encounter for diabetic foot exam (HCC)    4. Primary hypertension  -     atenolol (TENORMIN) 25 MG tablet; Take 1 tablet by mouth Daily.  Dispense: 90 tablet; Refill: 0  -     lisinopril (PRINIVIL,ZESTRIL) 5 MG tablet; Take 1 tablet by mouth Daily.  Dispense: 90 tablet; Refill: 0  -     Comprehensive Metabolic Panel  -     Lipid Panel    5. Mixed hyperlipidemia  -     pravastatin (PRAVACHOL) 80 MG tablet; Take 1 tablet by mouth Daily.  Dispense: 90 tablet; Refill: 0  -     Comprehensive Metabolic Panel  -     Lipid Panel    6. Arthritis  -     naproxen (NAPROSYN) 500 MG tablet; Take 1 tablet by mouth Daily.  Dispense: 90 tablet; Refill: 0    7. Influenza vaccination administered at current visit  -     FluLaval/Fluzone >6 mos ([id number])    8. Need for pneumococcal vaccination  -     Pneumococcal Conjugate Vaccine 20-Valent (PCV20)            Follow Up     Return for recheck in 3-6 months pending outcome of A1c.    Patient was given instructions and counseling regarding his condition or for health maintenance advice. Please see specific information pulled into the AVS if appropriate.

## 2022-12-31 LAB
ALBUMIN SERPL-MCNC: 4.2 G/DL (ref 3.5–5.2)
ALBUMIN/GLOB SERPL: 1.2 G/DL
ALP SERPL-CCNC: 90 U/L (ref 39–117)
ALT SERPL W P-5'-P-CCNC: 15 U/L (ref 1–41)
ANION GAP SERPL CALCULATED.3IONS-SCNC: 13.9 MMOL/L (ref 5–15)
AST SERPL-CCNC: 19 U/L (ref 1–40)
BILIRUB SERPL-MCNC: 0.4 MG/DL (ref 0–1.2)
BUN SERPL-MCNC: 11 MG/DL (ref 6–20)
BUN/CREAT SERPL: 13.3 (ref 7–25)
CALCIUM SPEC-SCNC: 9.4 MG/DL (ref 8.6–10.5)
CHLORIDE SERPL-SCNC: 104 MMOL/L (ref 98–107)
CHOLEST SERPL-MCNC: 183 MG/DL (ref 0–200)
CO2 SERPL-SCNC: 22.1 MMOL/L (ref 22–29)
CREAT SERPL-MCNC: 0.83 MG/DL (ref 0.76–1.27)
EGFRCR SERPLBLD CKD-EPI 2021: 102.1 ML/MIN/1.73
GLOBULIN UR ELPH-MCNC: 3.4 GM/DL
GLUCOSE SERPL-MCNC: 169 MG/DL (ref 65–99)
HDLC SERPL-MCNC: 47 MG/DL (ref 40–60)
LDLC SERPL CALC-MCNC: 95 MG/DL (ref 0–100)
LDLC/HDLC SERPL: 1.87 {RATIO}
POTASSIUM SERPL-SCNC: 4.2 MMOL/L (ref 3.5–5.2)
PROT SERPL-MCNC: 7.6 G/DL (ref 6–8.5)
SODIUM SERPL-SCNC: 140 MMOL/L (ref 136–145)
TRIGL SERPL-MCNC: 240 MG/DL (ref 0–150)
VLDLC SERPL-MCNC: 41 MG/DL (ref 5–40)

## 2023-02-16 ENCOUNTER — TELEPHONE (OUTPATIENT)
Dept: FAMILY MEDICINE CLINIC | Facility: CLINIC | Age: 58
End: 2023-02-16
Payer: MEDICARE

## 2023-02-16 DIAGNOSIS — Z79.4 TYPE 2 DIABETES MELLITUS WITH HYPERGLYCEMIA, WITH LONG-TERM CURRENT USE OF INSULIN: ICD-10-CM

## 2023-02-16 DIAGNOSIS — E11.65 TYPE 2 DIABETES MELLITUS WITH HYPERGLYCEMIA, WITH LONG-TERM CURRENT USE OF INSULIN: ICD-10-CM

## 2023-02-16 RX ORDER — INSULIN DEGLUDEC 200 U/ML
54 INJECTION, SOLUTION SUBCUTANEOUS DAILY
Qty: 27 ML | Refills: 0 | Status: SHIPPED | OUTPATIENT
Start: 2023-02-16

## 2023-02-16 RX ORDER — INSULIN DEGLUDEC INJECTION 100 U/ML
INJECTION, SOLUTION SUBCUTANEOUS
Qty: 54 ML | Refills: 0 | Status: SHIPPED | OUTPATIENT
Start: 2023-02-16 | End: 2023-02-16

## 2023-02-16 NOTE — TELEPHONE ENCOUNTER
Norma from [facility] called. They do not have any tresiba 100mg/ml and are needing a changed prescription to the 200mg/ml pen with dosing change instructions to fit that pen.

## 2023-02-17 ENCOUNTER — TELEPHONE (OUTPATIENT)
Dept: FAMILY MEDICINE CLINIC | Facility: CLINIC | Age: 58
End: 2023-02-17

## 2023-02-17 DIAGNOSIS — Z79.4 TYPE 2 DIABETES MELLITUS WITH HYPERGLYCEMIA, WITH LONG-TERM CURRENT USE OF INSULIN: Primary | ICD-10-CM

## 2023-02-17 DIAGNOSIS — E11.65 TYPE 2 DIABETES MELLITUS WITH HYPERGLYCEMIA, WITH LONG-TERM CURRENT USE OF INSULIN: Primary | ICD-10-CM

## 2023-02-17 RX ORDER — DULAGLUTIDE 1.5 MG/.5ML
1.5 INJECTION, SOLUTION SUBCUTANEOUS
Qty: 2 ML | Refills: 0 | Status: SHIPPED | OUTPATIENT
Start: 2023-03-17 | End: 2023-04-14 | Stop reason: DRUGHIGH

## 2023-02-17 RX ORDER — DULAGLUTIDE 0.75 MG/.5ML
0.75 INJECTION, SOLUTION SUBCUTANEOUS
Qty: 2 ML | Refills: 0 | Status: SHIPPED | OUTPATIENT
Start: 2023-02-17 | End: 2023-04-13

## 2023-02-17 NOTE — TELEPHONE ENCOUNTER
Caller: MATTOTTO    Relationship: Emergency Contact    Best call back number: [phone]    What medication are you requesting: REPLACEMENT FOR Semaglutide, 1 MG/DOSE, (Ozempic, 1 MG/DOSE,) 2 MG/1.5ML solution pen-injector         What are your current symptoms: DIABETES        If a prescription is needed, what is your preferred pharmacy and phone number: [id number] [place] [facility] [address] AT [facility] [place] - [phone]  - [phone] FX     Additional notes: PHARMACY ADVISED THEY CAN NO LONGER GET THIS MEDICATION AND PATIENT NEEDS A REPLACEMENT, HE HAS NOT HAD AN INJECTION SINCE LAST Saturday AND HE DOES THE SHOTS ON Wednesday AND Saturday PLEASE NOTIFY WHEN NEW PRESCRIPTION IS SENT

## 2023-03-11 DIAGNOSIS — Z79.4 TYPE 2 DIABETES MELLITUS WITH HYPERGLYCEMIA, WITH LONG-TERM CURRENT USE OF INSULIN: ICD-10-CM

## 2023-03-11 DIAGNOSIS — E11.65 TYPE 2 DIABETES MELLITUS WITH HYPERGLYCEMIA, WITH LONG-TERM CURRENT USE OF INSULIN: ICD-10-CM

## 2023-03-13 RX ORDER — EMPAGLIFLOZIN 25 MG/1
TABLET, FILM COATED ORAL
Qty: 90 TABLET | Refills: 0 | Status: SHIPPED | OUTPATIENT
Start: 2023-03-13

## 2023-03-13 RX ORDER — SITAGLIPTIN 100 MG/1
TABLET, FILM COATED ORAL
Qty: 90 TABLET | Refills: 0 | Status: SHIPPED | OUTPATIENT
Start: 2023-03-13

## 2023-04-08 DIAGNOSIS — E11.42 DIABETIC PERIPHERAL NEUROPATHY: ICD-10-CM

## 2023-04-08 DIAGNOSIS — M19.90 ARTHRITIS: ICD-10-CM

## 2023-04-08 DIAGNOSIS — I10 PRIMARY HYPERTENSION: ICD-10-CM

## 2023-04-08 DIAGNOSIS — E78.2 MIXED HYPERLIPIDEMIA: ICD-10-CM

## 2023-04-10 RX ORDER — NAPROXEN 500 MG/1
500 TABLET ORAL DAILY
Qty: 90 TABLET | Refills: 0 | Status: SHIPPED | OUTPATIENT
Start: 2023-04-10

## 2023-04-10 RX ORDER — PRAVASTATIN SODIUM 80 MG/1
80 TABLET ORAL DAILY
Qty: 90 TABLET | Refills: 0 | Status: SHIPPED | OUTPATIENT
Start: 2023-04-10

## 2023-04-10 RX ORDER — AMITRIPTYLINE HYDROCHLORIDE 25 MG/1
25 TABLET, FILM COATED ORAL NIGHTLY
Qty: 90 TABLET | Refills: 0 | Status: SHIPPED | OUTPATIENT
Start: 2023-04-10

## 2023-04-10 RX ORDER — ATENOLOL 25 MG/1
25 TABLET ORAL DAILY
Qty: 90 TABLET | Refills: 0 | Status: SHIPPED | OUTPATIENT
Start: 2023-04-10

## 2023-04-13 ENCOUNTER — OFFICE VISIT (OUTPATIENT)
Dept: FAMILY MEDICINE CLINIC | Facility: CLINIC | Age: 58
End: 2023-04-13
Payer: MEDICARE

## 2023-04-13 VITALS
DIASTOLIC BLOOD PRESSURE: 70 MMHG | WEIGHT: 195 LBS | OXYGEN SATURATION: 97 % | BODY MASS INDEX: 30.61 KG/M2 | HEART RATE: 80 BPM | SYSTOLIC BLOOD PRESSURE: 110 MMHG | HEIGHT: 67 IN

## 2023-04-13 DIAGNOSIS — Z13.31 POSITIVE DEPRESSION SCREENING: ICD-10-CM

## 2023-04-13 DIAGNOSIS — I10 PRIMARY HYPERTENSION: ICD-10-CM

## 2023-04-13 DIAGNOSIS — J20.9 ACUTE BRONCHITIS, UNSPECIFIED ORGANISM: ICD-10-CM

## 2023-04-13 DIAGNOSIS — R12 CHRONIC HEARTBURN: ICD-10-CM

## 2023-04-13 DIAGNOSIS — E78.2 MIXED HYPERLIPIDEMIA: ICD-10-CM

## 2023-04-13 DIAGNOSIS — R13.10 DYSPHAGIA, UNSPECIFIED TYPE: ICD-10-CM

## 2023-04-13 DIAGNOSIS — E11.42 DIABETIC PERIPHERAL NEUROPATHY: ICD-10-CM

## 2023-04-13 DIAGNOSIS — E11.65 TYPE 2 DIABETES MELLITUS WITH HYPERGLYCEMIA, WITH LONG-TERM CURRENT USE OF INSULIN: Primary | ICD-10-CM

## 2023-04-13 DIAGNOSIS — Z79.4 TYPE 2 DIABETES MELLITUS WITH HYPERGLYCEMIA, WITH LONG-TERM CURRENT USE OF INSULIN: Primary | ICD-10-CM

## 2023-04-13 LAB
ALBUMIN SERPL-MCNC: 4.6 G/DL (ref 3.5–5.2)
ALBUMIN UR-MCNC: 6.6 MG/DL
ALBUMIN/GLOB SERPL: 1.2 G/DL
ALP SERPL-CCNC: 96 U/L (ref 39–117)
ALT SERPL W P-5'-P-CCNC: 13 U/L (ref 1–41)
ANION GAP SERPL CALCULATED.3IONS-SCNC: 11 MMOL/L (ref 5–15)
AST SERPL-CCNC: 16 U/L (ref 1–40)
BASOPHILS # BLD AUTO: 0.12 10*3/MM3 (ref 0–0.2)
BASOPHILS NFR BLD AUTO: 0.9 % (ref 0–1.5)
BILIRUB SERPL-MCNC: 0.3 MG/DL (ref 0–1.2)
BUN SERPL-MCNC: 16 MG/DL (ref 6–20)
BUN/CREAT SERPL: 14.3 (ref 7–25)
CALCIUM SPEC-SCNC: 9.6 MG/DL (ref 8.6–10.5)
CHLORIDE SERPL-SCNC: 98 MMOL/L (ref 98–107)
CHOLEST SERPL-MCNC: 236 MG/DL (ref 0–200)
CO2 SERPL-SCNC: 24 MMOL/L (ref 22–29)
CREAT SERPL-MCNC: 1.12 MG/DL (ref 0.76–1.27)
CREAT UR-MCNC: 70.7 MG/DL
DEPRECATED RDW RBC AUTO: 41.9 FL (ref 37–54)
EGFRCR SERPLBLD CKD-EPI 2021: 76.6 ML/MIN/1.73
EOSINOPHIL # BLD AUTO: 0.8 10*3/MM3 (ref 0–0.4)
EOSINOPHIL NFR BLD AUTO: 6.1 % (ref 0.3–6.2)
ERYTHROCYTE [DISTWIDTH] IN BLOOD BY AUTOMATED COUNT: 13.6 % (ref 12.3–15.4)
GLOBULIN UR ELPH-MCNC: 3.7 GM/DL
GLUCOSE SERPL-MCNC: 341 MG/DL (ref 65–99)
HBA1C MFR BLD: 8.8 % (ref 4.8–5.6)
HCT VFR BLD AUTO: 55.7 % (ref 37.5–51)
HDLC SERPL-MCNC: 48 MG/DL (ref 40–60)
HGB BLD-MCNC: 18.7 G/DL (ref 13–17.7)
IMM GRANULOCYTES # BLD AUTO: 0.08 10*3/MM3 (ref 0–0.05)
IMM GRANULOCYTES NFR BLD AUTO: 0.6 % (ref 0–0.5)
LDLC SERPL CALC-MCNC: 119 MG/DL (ref 0–100)
LDLC/HDLC SERPL: 2.28 {RATIO}
LYMPHOCYTES # BLD AUTO: 4.57 10*3/MM3 (ref 0.7–3.1)
LYMPHOCYTES NFR BLD AUTO: 34.6 % (ref 19.6–45.3)
MCH RBC QN AUTO: 28.9 PG (ref 26.6–33)
MCHC RBC AUTO-ENTMCNC: 33.6 G/DL (ref 31.5–35.7)
MCV RBC AUTO: 86 FL (ref 79–97)
MICROALBUMIN/CREAT UR: 93.4 MG/G
MONOCYTES # BLD AUTO: 0.92 10*3/MM3 (ref 0.1–0.9)
MONOCYTES NFR BLD AUTO: 7 % (ref 5–12)
NEUTROPHILS NFR BLD AUTO: 50.8 % (ref 42.7–76)
NEUTROPHILS NFR BLD AUTO: 6.71 10*3/MM3 (ref 1.7–7)
NRBC BLD AUTO-RTO: 0 /100 WBC (ref 0–0.2)
PLATELET # BLD AUTO: 319 10*3/MM3 (ref 140–450)
PMV BLD AUTO: 11 FL (ref 6–12)
POTASSIUM SERPL-SCNC: 4.6 MMOL/L (ref 3.5–5.2)
PROT SERPL-MCNC: 8.3 G/DL (ref 6–8.5)
RBC # BLD AUTO: 6.48 10*6/MM3 (ref 4.14–5.8)
SODIUM SERPL-SCNC: 133 MMOL/L (ref 136–145)
T4 FREE SERPL-MCNC: 0.89 NG/DL (ref 0.93–1.7)
TRIGL SERPL-MCNC: 392 MG/DL (ref 0–150)
TSH SERPL DL<=0.05 MIU/L-ACNC: 3.95 UIU/ML (ref 0.27–4.2)
VLDLC SERPL-MCNC: 69 MG/DL (ref 5–40)
WBC NRBC COR # BLD: 13.2 10*3/MM3 (ref 3.4–10.8)

## 2023-04-13 PROCEDURE — 83036 HEMOGLOBIN GLYCOSYLATED A1C: CPT | Performed by: NURSE PRACTITIONER

## 2023-04-13 PROCEDURE — 84439 ASSAY OF FREE THYROXINE: CPT | Performed by: NURSE PRACTITIONER

## 2023-04-13 PROCEDURE — 84443 ASSAY THYROID STIM HORMONE: CPT | Performed by: NURSE PRACTITIONER

## 2023-04-13 PROCEDURE — 82043 UR ALBUMIN QUANTITATIVE: CPT | Performed by: NURSE PRACTITIONER

## 2023-04-13 PROCEDURE — 80053 COMPREHEN METABOLIC PANEL: CPT | Performed by: NURSE PRACTITIONER

## 2023-04-13 PROCEDURE — 82570 ASSAY OF URINE CREATININE: CPT | Performed by: NURSE PRACTITIONER

## 2023-04-13 PROCEDURE — 85025 COMPLETE CBC W/AUTO DIFF WBC: CPT | Performed by: NURSE PRACTITIONER

## 2023-04-13 PROCEDURE — 80061 LIPID PANEL: CPT | Performed by: NURSE PRACTITIONER

## 2023-04-13 RX ORDER — INSULIN DEGLUDEC INJECTION 100 U/ML
INJECTION, SOLUTION SUBCUTANEOUS
COMMUNITY
Start: 2023-02-16 | End: 2023-04-13

## 2023-04-13 RX ORDER — METHYLPREDNISOLONE 4 MG/1
TABLET ORAL
Qty: 1 EACH | Refills: 0 | Status: SHIPPED | OUTPATIENT
Start: 2023-04-13

## 2023-04-13 RX ORDER — FAMOTIDINE 20 MG/1
TABLET, FILM COATED ORAL
Qty: 180 TABLET | Refills: 0 | Status: SHIPPED | OUTPATIENT
Start: 2023-04-13

## 2023-04-13 RX ORDER — AZITHROMYCIN 250 MG/1
TABLET, FILM COATED ORAL
Qty: 6 TABLET | Refills: 0 | Status: SHIPPED | OUTPATIENT
Start: 2023-04-13

## 2023-04-13 RX ORDER — LISINOPRIL 5 MG/1
5 TABLET ORAL DAILY
Qty: 90 TABLET | Refills: 0 | Status: SHIPPED | OUTPATIENT
Start: 2023-04-13

## 2023-04-13 RX ORDER — GUAIFENESIN 600 MG/1
1200 TABLET, EXTENDED RELEASE ORAL 2 TIMES DAILY
Qty: 40 TABLET | Refills: 0 | Status: SHIPPED | OUTPATIENT
Start: 2023-04-13

## 2023-04-13 NOTE — PROGRESS NOTES
Venipuncture Blood Specimen Collection  Venipuncture performed in left arm by Enid Muñoz with good hemostasis. Patient tolerated the procedure well without complications.   04/13/23   Enid Muñoz

## 2023-04-13 NOTE — PROGRESS NOTES
Chief Complaint  Diabetes, Hypertension, and Hyperlipidemia    Subjective            Zehcariah Fleming presents to [facility]  History of Present Illness     Vipul presents to the office today to follow-up on chronic health conditions and for his medication refills.  Additionally, he has been sick for the past 3 weeks, approximately.    Blood pressure is normotensive on exam today, 110/70.  Blood pressure is currently treated with atenolol 25 mg daily and lisinopril 5 mg daily.  He denies any chest pain, palpitations, headaches, dizziness, or lower extremity edema.    His hyperlipidemia is treated with pravastatin 80 mg daily.  No complaints of myalgia with use.    He is taking naproxen for his arthritis with fair control.  Denies any melena.  He is having some intermittent heartburn and dysphagia.  He states that he has had dysphagia off and on for years.  He had an EGD in 2015, but he does not recall if he had dilation or not at that time.  This most recent episode of dysphagia has been present for the past several months and occurs with solids and liquids.  He denies any abdominal pain.  He denies nausea or vomiting.  He is not having any constipation or diarrhea.    He has type 2 diabetes mellitus, currently prescribed Januvia, Jardiance, and Trulicity.  His Trulicity was increased from 0.75 mg weekly to 1.5 mg weekly with his last A1c in January.  He does not believe that the dysphagia is related to use of Trulicity since he has had dysphagia off-and-on for several years.  He denies polyuria, polydipsia, or polyphagia.  He does not check his blood sugar routinely.  He is taking Elavil for diabetic peripheral neuropathy and feels that it is working adequately, although he does have some somnolence in the mornings.  He has not tried cutting the dose in half.    He states he has been sick for the past 3 weeks.  Denies any fever, chills, or body aches.  He mainly endorses significant  "cough with congestion and he is unable to expectorate mucus. He feels like he needs suction sometimes because it is sitting in the back of his throat.  He is not wheezing or having shortness of breath that he can tell.  He just cannot \"kick this cough\".    His depression screening is positive today with a PHQ-9 score of 20.  He states that he does feel depressed.  He states there is a longstanding family history of depression.  He does not want treatment of depression.  He states that he is not going to kill himself.  He states he would never do that.  He states he has too much to live for to cause any self-harm.  He feels he is depressed because \"that is just life\".    PHQ-9 Depression Screening  Little interest or pleasure in doing things? 2-->more than half the days   Feeling down, depressed, or hopeless? 3-->nearly every day   Trouble falling or staying asleep, or sleeping too much? 3-->nearly every day   Feeling tired or having little energy? 3-->nearly every day   Poor appetite or overeating? 2-->more than half the days   Feeling bad about yourself - or that you are a failure or have let yourself or your family down? 3-->nearly every day   Trouble concentrating on things, such as reading the newspaper or watching television? 2-->more than half the days   Moving or speaking so slowly that other people could have noticed? Or the opposite - being so fidgety or restless that you have been moving around a lot more than usual? 2-->more than half the days   Thoughts that you would be better off dead, or of hurting yourself in some way? 0-->not at all   PHQ-9 Total Score 20   If you checked off any problems, how difficult have these problems made it for you to do your work, take care of things at home, or get along with other people? somewhat difficult       Past Medical History:   Diagnosis Date   • Arthritis 6/9/2022   • Mixed hyperlipidemia 6/9/2022   • Primary hypertension 6/9/2022   • Type 2 diabetes mellitus " with hyperglycemia, with long-term current use of insulin 6/9/2022       No Known Allergies     Past Surgical History:   Procedure Laterality Date   • SHOULDER SURGERY Bilateral     2 on the RT and 2 on the LT        Social History     Tobacco Use   • Smoking status: Every Day     Packs/day: 1.00     Years: 41.00     Pack years: 41.00     Types: Cigarettes   • Smokeless tobacco: Never   Substance Use Topics   • Alcohol use: Never   • Drug use: Never       Family History   Problem Relation Age of Onset   • Developmental Disability Father    • Cancer Maternal Uncle    • Developmental Disability Maternal Grandmother    • Cancer Maternal Grandfather         Health Maintenance Due   Topic Date Due   • Hepatitis B (1 of 3 - 3-dose series) Never done   • TDAP/TD VACCINES (1 - Tdap) Never done   • ZOSTER VACCINE (1 of 2) Never done   • COVID-19 Vaccine (3 - Booster for Pfizer series) 06/08/2021        Current Outpatient Medications on File Prior to Visit   Medication Sig   • amitriptyline (ELAVIL) 25 MG tablet TAKE 1 TABLET BY MOUTH EVERY NIGHT   • atenolol (TENORMIN) 25 MG tablet TAKE 1 TABLET BY MOUTH DAILY   • Dulaglutide (Trulicity) 1.5 MG/0.5ML solution pen-injector Inject 1.5 mg under the skin into the appropriate area as directed Every 7 (Seven) Days.   • glucose blood (FREESTYLE LITE) test strip 1 each by Other route Daily. To check fasting   • Insulin Degludec (Tresiba FlexTouch) 200 UNIT/ML solution pen-injector pen injection Inject 54 Units under the skin into the appropriate area as directed Daily.   • Januvia 100 MG tablet TAKE 1 TABLET BY MOUTH DAILY   • Jardiance 25 MG tablet tablet TAKE 1 TABLET BY MOUTH DAILY   • naproxen (NAPROSYN) 500 MG tablet TAKE 1 TABLET BY MOUTH DAILY   • pravastatin (PRAVACHOL) 80 MG tablet TAKE 1 TABLET BY MOUTH DAILY   • [DISCONTINUED] Dulaglutide (Trulicity) 0.75 MG/0.5ML solution pen-injector Inject 0.75 mg under the skin into the appropriate area as directed Every 7 (Seven)  "Days.   • [DISCONTINUED] lisinopril (PRINIVIL,ZESTRIL) 5 MG tablet Take 1 tablet by mouth Daily.   • [DISCONTINUED] Tresiba FlexTouch 100 UNIT/ML solution pen-injector injection      No current facility-administered medications on file prior to visit.       Immunization History   Administered Date(s) Administered   • COVID-19 (PFIZER) PURPLE CAP 03/22/2021, 04/13/2021   • Flu Vaccine Quad PF >36MO 10/02/2015, 10/04/2016, 10/26/2018, 12/05/2019, 11/04/2020   • FluLaval/Fluzone >6mos 12/30/2022   • Pneumococcal Conjugate 20-Valent (PCV20) 12/30/2022   • Pneumococcal Polysaccharide (PPSV23) 08/04/2017       Review of Systems     Objective     /70   Pulse 80   Ht 170.2 cm (67\")   Wt 88.5 kg (195 lb)   SpO2 97%   BMI 30.54 kg/m²       Physical Exam  Vitals reviewed.   Constitutional:       General: He is not in acute distress.     Appearance: He is well-developed. He is obese.   HENT:      Head: Normocephalic and atraumatic.   Eyes:      General: No scleral icterus.     Extraocular Movements: Extraocular movements intact.      Conjunctiva/sclera: Conjunctivae normal.   Neck:      Thyroid: No thyroid mass, thyromegaly or thyroid tenderness.      Vascular: No carotid bruit.      Trachea: Trachea normal.   Cardiovascular:      Rate and Rhythm: Normal rate and regular rhythm.      Pulses: Normal pulses.      Heart sounds: No murmur heard.  Pulmonary:      Effort: Pulmonary effort is normal. No respiratory distress.      Breath sounds: Normal breath sounds. No wheezing, rhonchi or rales.      Comments: Congested cough witnessed on exam, but no adventitious breath sounds appreciated on auscultation.  Abdominal:      General: Bowel sounds are normal. There is no distension.      Palpations: Abdomen is soft. There is no mass.      Tenderness: There is no abdominal tenderness.   Musculoskeletal:         General: Normal range of motion.      Cervical back: Normal range of motion and neck supple. No tenderness.      " Right lower leg: No edema.      Left lower leg: No edema.   Lymphadenopathy:      Cervical: No cervical adenopathy.   Skin:     General: Skin is warm and dry.   Neurological:      Mental Status: He is alert and oriented to person, place, and time.   Psychiatric:         Mood and Affect: Mood and affect normal.         Behavior: Behavior normal.         Thought Content: Thought content normal.         Judgment: Judgment normal.         Result Review :     The following data was reviewed by: BIENVENIDO Ross on 04/13/2023:    CMP        6/9/2022    08:25 9/21/2022    11:02 12/30/2022    10:03   CMP   Glucose 240   133   169     BUN 10   12   11     Creatinine 0.90   0.83   0.83     EGFR 100.2   102.1   102.1     Sodium 138   140   140     Potassium 4.0   4.3   4.2     Chloride 104   104   104     Calcium 9.2   9.6   9.4     Total Protein 7.5   7.6   7.6     Albumin 4.00   4.30   4.2     Globulin 3.5   3.3   3.4     Total Bilirubin 0.4   0.3   0.4     Alkaline Phosphatase 80   79   90     AST (SGOT) 16   15   19     ALT (SGPT) 16   15   15     Albumin/Globulin Ratio 1.1   1.3   1.2     BUN/Creatinine Ratio 11.1   14.5   13.3     Anion Gap 13.3   13.2   13.9       CBC        6/9/2022    08:25   CBC   WBC 10.50     RBC 6.18     Hemoglobin 17.4     Hematocrit 53.0     MCV 85.8     MCH 28.2     MCHC 32.8     RDW 13.8     Platelets 251       Lipid Panel        6/9/2022    08:25 9/21/2022    11:02 12/30/2022    10:03   Lipid Panel   Total Cholesterol 151   181   183     Triglycerides 251   260   240     HDL Cholesterol 36   54   47     VLDL Cholesterol 41   43   41     LDL Cholesterol  74   84   95     LDL/HDL Ratio 1.80   1.39   1.87       TSH        6/9/2022    08:25   TSH   TSH 2.360       A1C Last 3 Results        6/9/2022    08:25 9/21/2022    11:02 12/30/2022    10:03   HGBA1C Last 3 Results   Hemoglobin A1C 10.10   9.00   8.90       Microalbumin        6/9/2022    08:37   Microalbumin   Microalbumin, Urine  1.3       PSA        6/9/2022    08:25   PSA   PSA 0.410         Data reviewed: Radiologic studies : CT chest and GI studies : egd/COLON pathology   CT Chest Low Dose Cancer Screening WO (09/19/2022 11:46)  SCANNED - [facility] (09/11/2015)           Assessment and Plan      Diagnoses and all orders for this visit:    1. Type 2 diabetes mellitus with hyperglycemia, with long-term current use of insulin (Primary)  -     CBC Auto Differential  -     Comprehensive Metabolic Panel  -     Hemoglobin A1c  -     Lipid Panel  -     TSH+Free T4  -     Microalbumin / Creatinine Urine Ratio - Urine, Clean Catch    2. Diabetic peripheral neuropathy  Comments:  Instructed to take half a pill nightly to see if this improves daytime somnolence, but continues to control symptoms.    3. Primary hypertension  -     lisinopril (PRINIVIL,ZESTRIL) 5 MG tablet; Take 1 tablet by mouth Daily.  Dispense: 90 tablet; Refill: 0  -     CBC Auto Differential  -     Comprehensive Metabolic Panel  -     Lipid Panel  -     TSH+Free T4  -     Microalbumin / Creatinine Urine Ratio - Urine, Clean Catch    4. Mixed hyperlipidemia  -     Comprehensive Metabolic Panel  -     Lipid Panel    5. Dysphagia, unspecified type  -     Ambulatory Referral to Gastroenterology  -     FL Upper GI With Air Contrast; Future  -     famotidine (Pepcid) 20 MG tablet; Take 1 PO BID for heartburn  Dispense: 180 tablet; Refill: 0    6. Chronic heartburn  -     Ambulatory Referral to Gastroenterology  -     FL Upper GI With Air Contrast; Future  -     famotidine (Pepcid) 20 MG tablet; Take 1 PO BID for heartburn  Dispense: 180 tablet; Refill: 0    7. Positive depression screening  Comments:  Patient does not want treatment at this time.     8. Acute bronchitis, unspecified organism  -     azithromycin (Zithromax Z-Casimiro) 250 MG tablet; Take 2 tablets by mouth on day 1, then 1 tablet daily on days 2-5  Dispense: 6 tablet; Refill: 0  -     methylPREDNISolone (MEDROL) 4 MG dose  pack; Take as directed on package instructions.  Dispense: 1 each; Refill: 0  -     guaiFENesin (Mucinex) 600 MG 12 hr tablet; Take 2 tablets by mouth 2 (Two) Times a Day.  Dispense: 40 tablet; Refill: 0            Follow Up     Return if symptoms worsen or fail to improve.  I am going to treat him for acute bronchitis with Z-Casimiro, Medrol Dosepak, and Mucinex.  He will follow-up with me if symptoms worsen or do not improve with treatment.    We will get his fasting labs today.  Most of his medications were sent over on Monday of this week, but other medications remaining will be sent today.  I will adjust his Trulicity pending outcome of A1c.    In regards to his chronic heartburn and dysphagia I am going to refer to gastroenterology and order UGI.  I will also go ahead and put him on Pepcid 20 mg twice daily for heartburn symptoms as a trial.    Patient was given instructions and counseling regarding his condition or for health maintenance advice. Please see specific information pulled into the AVS if appropriate.

## 2023-04-14 DIAGNOSIS — E11.65 TYPE 2 DIABETES MELLITUS WITH HYPERGLYCEMIA, WITH LONG-TERM CURRENT USE OF INSULIN: ICD-10-CM

## 2023-04-14 DIAGNOSIS — Z79.4 TYPE 2 DIABETES MELLITUS WITH HYPERGLYCEMIA, WITH LONG-TERM CURRENT USE OF INSULIN: ICD-10-CM

## 2023-04-14 DIAGNOSIS — R79.89 ABNORMAL CBC: Primary | ICD-10-CM

## 2023-04-14 DIAGNOSIS — E78.5 DYSLIPIDEMIA: ICD-10-CM

## 2023-04-14 RX ORDER — DULAGLUTIDE 3 MG/.5ML
3 INJECTION, SOLUTION SUBCUTANEOUS WEEKLY
Qty: 6 ML | Refills: 0 | Status: SHIPPED | OUTPATIENT
Start: 2023-04-14

## 2023-04-14 RX ORDER — EZETIMIBE 10 MG/1
10 TABLET ORAL DAILY
Qty: 90 TABLET | Refills: 0 | Status: SHIPPED | OUTPATIENT
Start: 2023-04-14

## 2023-05-02 ENCOUNTER — TELEPHONE (OUTPATIENT)
Dept: FAMILY MEDICINE CLINIC | Facility: CLINIC | Age: 58
End: 2023-05-02
Payer: MEDICARE

## 2023-05-11 ENCOUNTER — TELEPHONE (OUTPATIENT)
Dept: FAMILY MEDICINE CLINIC | Facility: CLINIC | Age: 58
End: 2023-05-11
Payer: MEDICARE

## 2023-06-12 DIAGNOSIS — Z79.4 TYPE 2 DIABETES MELLITUS WITH HYPERGLYCEMIA, WITH LONG-TERM CURRENT USE OF INSULIN: ICD-10-CM

## 2023-06-12 DIAGNOSIS — E11.65 TYPE 2 DIABETES MELLITUS WITH HYPERGLYCEMIA, WITH LONG-TERM CURRENT USE OF INSULIN: ICD-10-CM

## 2023-06-12 RX ORDER — EMPAGLIFLOZIN 25 MG/1
TABLET, FILM COATED ORAL
Qty: 90 TABLET | Refills: 0 | Status: SHIPPED | OUTPATIENT
Start: 2023-06-12

## 2023-06-12 RX ORDER — SITAGLIPTIN 100 MG/1
TABLET, FILM COATED ORAL
Qty: 90 TABLET | Refills: 0 | Status: SHIPPED | OUTPATIENT
Start: 2023-06-12

## 2023-08-09 ENCOUNTER — OFFICE VISIT (OUTPATIENT)
Dept: FAMILY MEDICINE CLINIC | Facility: CLINIC | Age: 58
End: 2023-08-09
Payer: MEDICARE

## 2023-08-09 ENCOUNTER — TELEPHONE (OUTPATIENT)
Dept: FAMILY MEDICINE CLINIC | Facility: CLINIC | Age: 58
End: 2023-08-09

## 2023-08-09 VITALS
OXYGEN SATURATION: 98 % | DIASTOLIC BLOOD PRESSURE: 80 MMHG | HEART RATE: 85 BPM | WEIGHT: 196 LBS | BODY MASS INDEX: 30.24 KG/M2 | SYSTOLIC BLOOD PRESSURE: 120 MMHG

## 2023-08-09 DIAGNOSIS — E78.2 MIXED HYPERLIPIDEMIA: ICD-10-CM

## 2023-08-09 DIAGNOSIS — Z79.4 TYPE 2 DIABETES MELLITUS WITH HYPERGLYCEMIA, WITH LONG-TERM CURRENT USE OF INSULIN: Primary | ICD-10-CM

## 2023-08-09 DIAGNOSIS — D58.2 ELEVATED HEMOGLOBIN: ICD-10-CM

## 2023-08-09 DIAGNOSIS — E53.8 B12 DEFICIENCY: ICD-10-CM

## 2023-08-09 DIAGNOSIS — I10 PRIMARY HYPERTENSION: ICD-10-CM

## 2023-08-09 DIAGNOSIS — D72.829 LEUKOCYTOSIS, UNSPECIFIED TYPE: ICD-10-CM

## 2023-08-09 DIAGNOSIS — E11.42 DIABETIC PERIPHERAL NEUROPATHY: ICD-10-CM

## 2023-08-09 DIAGNOSIS — R71.8 ELEVATED HEMATOCRIT: ICD-10-CM

## 2023-08-09 DIAGNOSIS — N52.9 ERECTILE DYSFUNCTION, UNSPECIFIED ERECTILE DYSFUNCTION TYPE: ICD-10-CM

## 2023-08-09 DIAGNOSIS — E11.65 TYPE 2 DIABETES MELLITUS WITH HYPERGLYCEMIA, WITH LONG-TERM CURRENT USE OF INSULIN: Primary | ICD-10-CM

## 2023-08-09 LAB
DEPRECATED RDW RBC AUTO: 41.9 FL (ref 37–54)
EOSINOPHIL # BLD MANUAL: 0.84 10*3/MM3 (ref 0–0.4)
EOSINOPHIL NFR BLD MANUAL: 9 % (ref 0.3–6.2)
ERYTHROCYTE [DISTWIDTH] IN BLOOD BY AUTOMATED COUNT: 13.4 % (ref 12.3–15.4)
HCT VFR BLD AUTO: 50.8 % (ref 37.5–51)
HGB BLD-MCNC: 16.9 G/DL (ref 13–17.7)
LYMPHOCYTES # BLD MANUAL: 4.03 10*3/MM3 (ref 0.7–3.1)
LYMPHOCYTES NFR BLD MANUAL: 9 % (ref 5–12)
MCH RBC QN AUTO: 29 PG (ref 26.6–33)
MCHC RBC AUTO-ENTMCNC: 33.3 G/DL (ref 31.5–35.7)
MCV RBC AUTO: 87.1 FL (ref 79–97)
MONOCYTES # BLD: 0.84 10*3/MM3 (ref 0.1–0.9)
NEUTROPHILS # BLD AUTO: 3.65 10*3/MM3 (ref 1.7–7)
NEUTROPHILS NFR BLD MANUAL: 39 % (ref 42.7–76)
PLAT MORPH BLD: NORMAL
PLATELET # BLD AUTO: 263 10*3/MM3 (ref 140–450)
PMV BLD AUTO: 11.1 FL (ref 6–12)
RBC # BLD AUTO: 5.83 10*6/MM3 (ref 4.14–5.8)
RBC MORPH BLD: NORMAL
VARIANT LYMPHS NFR BLD MANUAL: 43 % (ref 19.6–45.3)
WBC MORPH BLD: NORMAL
WBC NRBC COR # BLD: 9.37 10*3/MM3 (ref 3.4–10.8)

## 2023-08-09 PROCEDURE — 3079F DIAST BP 80-89 MM HG: CPT | Performed by: NURSE PRACTITIONER

## 2023-08-09 PROCEDURE — 99214 OFFICE O/P EST MOD 30 MIN: CPT | Performed by: NURSE PRACTITIONER

## 2023-08-09 PROCEDURE — 3046F HEMOGLOBIN A1C LEVEL >9.0%: CPT | Performed by: NURSE PRACTITIONER

## 2023-08-09 PROCEDURE — 3074F SYST BP LT 130 MM HG: CPT | Performed by: NURSE PRACTITIONER

## 2023-08-09 PROCEDURE — 1159F MED LIST DOCD IN RCRD: CPT | Performed by: NURSE PRACTITIONER

## 2023-08-09 PROCEDURE — 1160F RVW MEDS BY RX/DR IN RCRD: CPT | Performed by: NURSE PRACTITIONER

## 2023-08-09 PROCEDURE — 82668 ASSAY OF ERYTHROPOIETIN: CPT | Performed by: NURSE PRACTITIONER

## 2023-08-09 PROCEDURE — 85027 COMPLETE CBC AUTOMATED: CPT | Performed by: NURSE PRACTITIONER

## 2023-08-09 RX ORDER — TADALAFIL 10 MG/1
10 TABLET ORAL DAILY PRN
Qty: 20 TABLET | Refills: 0 | Status: SHIPPED | OUTPATIENT
Start: 2023-08-09

## 2023-08-09 NOTE — PROGRESS NOTES
Chief Complaint  Follow-up    Subjective            Zechariah Fleming presents to [facility]  History of Present Illness    Vipul presents to the office today for follow-up on recent lab work.      He was seen last month for his chronic health conditions.  His A1c had increased from 8.8% up to 12.5%.  Current prescriptions for diabetes include 3 mg of Trulicity weekly, in addition to Tresiba 54 units nightly.  He is also taking Jardiance and Januvia. He states that he is eating whatever he wants - he does not watch what he eats. He has complications of peripheral neuropathy, and ED -he was taking amitriptyline for the neuropathy but was experiencing excessive daytime somnolence. He states his insurance does not cover Viagra - he got some generic viagra 2 years ago and it didn't help at all. He has not tried Cialis.     He and his wife are back together right now - she wants it to work - but he wants to get this problem straightened out.     Lipid profile with elevated triglycerides, as well as elevated VLDL.  Overall, his numbers were improved with the addition of Zetia to his atorvastatin.  His triglycerides decreased from 392 down to 294, and his VLDL had decreased from 69 down to 48.    CBC showed elevated white blood cell count, as well as elevated hemoglobin and hematocrit.  B12 was lower end of normal.  A B12 supplement was sent to the pharmacy for him.  He denies any fever, chills, or body aches.  Denies any cough, wheeze, or shortness of breath.  He does have chronic nicotine dependence.  He is up-to-date on CT of the chest for lung cancer screening.  His next CT is due in September and is already scheduled.      Past Medical History:   Diagnosis Date    Arthritis 6/9/2022    Mixed hyperlipidemia 6/9/2022    Primary hypertension 6/9/2022    Type 2 diabetes mellitus with hyperglycemia, with long-term current use of insulin 6/9/2022       No Known Allergies     Past Surgical  History:   Procedure Laterality Date    SHOULDER SURGERY Bilateral     2 on the RT and 2 on the LT        Social History     Tobacco Use    Smoking status: Every Day     Packs/day: 1.00     Years: 42.00     Pack years: 42.00     Types: Cigarettes     Start date: 1981    Smokeless tobacco: Never   Substance Use Topics    Alcohol use: Never    Drug use: Never       Family History   Problem Relation Age of Onset    Developmental Disability Father     Cancer Maternal Uncle     Developmental Disability Maternal Grandmother     Cancer Maternal Grandfather         Health Maintenance Due   Topic Date Due    Hepatitis B (1 of 3 - 3-dose series) Never done    TDAP/TD VACCINES (1 - Tdap) Never done    DIABETIC EYE EXAM  07/12/2023        Current Outpatient Medications on File Prior to Visit   Medication Sig    atenolol (TENORMIN) 25 MG tablet Take 1 tablet by mouth Daily.    Dulaglutide (Trulicity) 3 MG/0.5ML solution pen-injector Inject 0.5 mL under the skin into the appropriate area as directed 1 (One) Time Per Week.    empagliflozin (Jardiance) 25 MG tablet tablet Take 1 tablet by mouth Daily.    ezetimibe (Zetia) 10 MG tablet Take 1 tablet by mouth Daily.    famotidine (PEPCID) 20 MG tablet TAKE 1 TABLET BY MOUTH TWICE DAILY FOR HEARTBURN    glucose blood (FREESTYLE LITE) test strip 1 each by Other route Daily. To check fasting    Insulin Degludec (Tresiba FlexTouch) 200 UNIT/ML solution pen-injector pen injection Inject 54 Units under the skin into the appropriate area as directed Daily.    lisinopril (PRINIVIL,ZESTRIL) 5 MG tablet Take 1 tablet by mouth Daily.    naproxen (NAPROSYN) 500 MG tablet Take 1 tablet by mouth Daily.    pravastatin (PRAVACHOL) 80 MG tablet Take 1 tablet by mouth Daily.    SITagliptin (Januvia) 100 MG tablet Take 1 tablet by mouth Daily.    vitamin B-12 (CYANOCOBALAMIN) 1000 MCG tablet Take 1 tablet by mouth Daily.     No current facility-administered medications on file prior to visit.        Immunization History   Administered Date(s) Administered    COVID-19 (PFIZER) Purple Cap Monovalent 03/22/2021, 04/13/2021    Flu Vaccine Quad PF >36MO 10/02/2015, 10/04/2016, 10/26/2018, 12/05/2019, 11/04/2020    Fluzone >6mos 12/30/2022    Pneumococcal Conjugate 20-Valent (PCV20) 12/30/2022    Pneumococcal Polysaccharide (PPSV23) 08/04/2017       Review of Systems     Objective     /80   Pulse 85   Wt 88.9 kg (196 lb)   SpO2 98%   BMI 30.24 kg/mý       Physical Exam  Vitals reviewed.   Constitutional:       General: He is not in acute distress.     Appearance: He is well-developed. He is obese.   HENT:      Head: Normocephalic and atraumatic.   Eyes:      General: No scleral icterus.        Right eye: No discharge.         Left eye: No discharge.      Extraocular Movements: Extraocular movements intact.      Conjunctiva/sclera: Conjunctivae normal.   Neck:      Thyroid: No thyroid mass, thyromegaly or thyroid tenderness.      Vascular: No carotid bruit.      Trachea: Trachea normal.   Cardiovascular:      Rate and Rhythm: Normal rate and regular rhythm.      Pulses: Normal pulses.      Heart sounds: No murmur heard.  Pulmonary:      Effort: Pulmonary effort is normal. No respiratory distress.      Breath sounds: Normal breath sounds. No wheezing, rhonchi or rales.   Musculoskeletal:         General: Normal range of motion.      Cervical back: Normal range of motion and neck supple. No tenderness.      Right lower leg: No edema.      Left lower leg: No edema.   Lymphadenopathy:      Cervical: No cervical adenopathy.   Skin:     General: Skin is warm and dry.   Neurological:      Mental Status: He is alert and oriented to person, place, and time.   Psychiatric:         Mood and Affect: Mood and affect normal.         Behavior: Behavior normal.         Thought Content: Thought content normal.         Judgment: Judgment normal.       Result Review :     The following data was reviewed by: Brittni LOZOYA  Vessels, APRN on 08/09/2023:    Hepatitis B surface antigen (07/18/2023 11:02)  Hepatitis B Surface Antibody (07/18/2023 11:02)  Hepatitis B Core Antibody, Total (07/18/2023 11:02)  CBC Auto Differential (07/18/2023 11:02)  Comprehensive Metabolic Panel (07/18/2023 11:02)  Hemoglobin A1c (07/18/2023 11:02)  Lipid Panel (07/18/2023 11:02)  TSH+Free T4 (07/18/2023 11:02)  PSA Screen (07/18/2023 11:02)  Vitamin B12 & Folate (07/18/2023 11:02)  Iron Profile (07/18/2023 11:02)  Ferritin (07/18/2023 11:02)        Data reviewed : Radiologic studies :    CT Chest Low Dose Cancer Screening WO (09/19/2022 11:46)            Assessment and Plan      Diagnoses and all orders for this visit:    1. Type 2 diabetes mellitus with hyperglycemia, with long-term current use of insulin (Primary)  Comments:  Medication x 3 months -Trulicity 3 mg weekly, Tresiba 54 units nightly, Jardiance, and Januvia.  He is going to monitor diet-limit sweets-repeat A1c 3 months    2. Diabetic peripheral neuropathy    3. Erectile dysfunction, unspecified erectile dysfunction type  -     tadalafil (Cialis) 10 MG tablet; Take 1 tablet by mouth Daily As Needed for Erectile Dysfunction (Take prior to sexual activity; effects may last up to 36H).  Dispense: 20 tablet; Refill: 0    4. Primary hypertension    5. Mixed hyperlipidemia    6. B12 deficiency    7. Leukocytosis, unspecified type  -     CBC w MANUAL Differential  -     Erythropoietin  -     JAK2 E12-15 + CALR + MPL  -     Ambulatory Referral to Hematology / Oncology    8. Elevated hemoglobin  -     CBC w MANUAL Differential  -     Erythropoietin  -     JAK2 E12-15 + CALR + MPL  -     Ambulatory Referral to Hematology / Oncology    9. Elevated hematocrit  -     CBC w MANUAL Differential  -     Erythropoietin  -     JAK2 E12-15 + CALR + MPL  -     Ambulatory Referral to Hematology / Oncology            Follow Up     Return in about 10 weeks (around 10/18/2023) for Next scheduled follow up, medication  refills and fasting labs.    We will repeat his A1c in October.  If no improvement, or if worsening, then I will plan to change his Trulicity.  We will consider a trial of Mounjaro.  We also discussed mealtime insulin, which she does not want to do.  He will likely not to be compliant with carb counting or fingersticks.  Advised that with A1c greater than 7% his risk for morbidity and mortality increase, additionally his neuropathy symptoms and ED will only progress.    Recent PSA was normal.  I will give him a trial of Cialis.  He will call me if this is not covered by his insurance.    In regards to the leukocytosis with elevated H&H, I will do a few additional labs today and get him referred to hematology/oncology in [facility] for further evaluation.    Patient was given instructions and counseling regarding his condition or for health maintenance advice. Please see specific information pulled into the AVS if appropriate.

## 2023-08-09 NOTE — PROGRESS NOTES
Venipuncture Blood Specimen Collection  Venipuncture performed in left arm  by Leandra Beckwith with good hemostasis. Patient tolerated the procedure well without complications.   08/09/23   Leandra Beckwith

## 2023-08-09 NOTE — TELEPHONE ENCOUNTER
"Caller: Zechariah Fleming \"DEENA\"    Relationship to patient: Self    Best call back number: [phone]     Patient is needing: NEEDING PRIOR AUTHORIZATION    MEDICATION THAT WAS PRESCRIBED TODAY DURING VISIT 8.9.2023    [id number] - [place], [place] - [address] AT [facility] [place] - [phone] [facility] [phone]   "

## 2023-08-10 LAB — EPO SERPL-ACNC: 7.1 MIU/ML (ref 2.6–18.5)

## 2023-08-15 LAB — REF LAB TEST METHOD: NORMAL

## 2023-08-24 LAB — REF LAB TEST METHOD: NORMAL

## 2023-10-10 DIAGNOSIS — E11.65 TYPE 2 DIABETES MELLITUS WITH HYPERGLYCEMIA, WITH LONG-TERM CURRENT USE OF INSULIN: ICD-10-CM

## 2023-10-10 DIAGNOSIS — Z79.4 TYPE 2 DIABETES MELLITUS WITH HYPERGLYCEMIA, WITH LONG-TERM CURRENT USE OF INSULIN: ICD-10-CM

## 2023-10-11 DIAGNOSIS — E11.65 TYPE 2 DIABETES MELLITUS WITH HYPERGLYCEMIA, WITH LONG-TERM CURRENT USE OF INSULIN: ICD-10-CM

## 2023-10-11 DIAGNOSIS — Z79.4 TYPE 2 DIABETES MELLITUS WITH HYPERGLYCEMIA, WITH LONG-TERM CURRENT USE OF INSULIN: ICD-10-CM

## 2023-10-11 RX ORDER — EMPAGLIFLOZIN 25 MG/1
25 TABLET, FILM COATED ORAL DAILY
Qty: 90 TABLET | Refills: 1 | OUTPATIENT
Start: 2023-10-11

## 2023-10-11 RX ORDER — SEMAGLUTIDE 1.34 MG/ML
INJECTION, SOLUTION SUBCUTANEOUS
Qty: 9 ML | OUTPATIENT
Start: 2023-10-11

## 2023-10-11 RX ORDER — DULAGLUTIDE 3 MG/.5ML
INJECTION, SOLUTION SUBCUTANEOUS
Qty: 6 ML | Refills: 0 | Status: SHIPPED | OUTPATIENT
Start: 2023-10-11

## 2023-10-11 RX ORDER — SITAGLIPTIN 100 MG/1
100 TABLET, FILM COATED ORAL DAILY
Qty: 90 TABLET | Refills: 1 | OUTPATIENT
Start: 2023-10-11

## 2023-10-11 RX ORDER — DULAGLUTIDE 0.75 MG/.5ML
INJECTION, SOLUTION SUBCUTANEOUS
Qty: 2 ML | Refills: 0 | OUTPATIENT
Start: 2023-10-11

## 2023-10-13 DIAGNOSIS — Z79.4 TYPE 2 DIABETES MELLITUS WITH HYPERGLYCEMIA, WITH LONG-TERM CURRENT USE OF INSULIN: ICD-10-CM

## 2023-10-13 DIAGNOSIS — E11.65 TYPE 2 DIABETES MELLITUS WITH HYPERGLYCEMIA, WITH LONG-TERM CURRENT USE OF INSULIN: ICD-10-CM

## 2023-10-16 RX ORDER — INSULIN DEGLUDEC 200 U/ML
54 INJECTION, SOLUTION SUBCUTANEOUS DAILY
Qty: 27 ML | Refills: 0 | Status: SHIPPED | OUTPATIENT
Start: 2023-10-16

## 2023-10-16 RX ORDER — INSULIN DEGLUDEC INJECTION 100 U/ML
INJECTION, SOLUTION SUBCUTANEOUS
Qty: 54 ML | Refills: 0 | OUTPATIENT
Start: 2023-10-16

## 2023-10-17 DIAGNOSIS — R13.10 DYSPHAGIA, UNSPECIFIED TYPE: ICD-10-CM

## 2023-10-17 DIAGNOSIS — R12 CHRONIC HEARTBURN: ICD-10-CM

## 2023-10-17 RX ORDER — FAMOTIDINE 20 MG/1
TABLET, FILM COATED ORAL
Qty: 180 TABLET | Refills: 1 | OUTPATIENT
Start: 2023-10-17

## 2023-10-27 ENCOUNTER — OFFICE VISIT (OUTPATIENT)
Dept: FAMILY MEDICINE CLINIC | Facility: CLINIC | Age: 58
End: 2023-10-27
Payer: MEDICARE

## 2023-10-27 VITALS
HEART RATE: 78 BPM | TEMPERATURE: 97.1 F | WEIGHT: 200 LBS | DIASTOLIC BLOOD PRESSURE: 80 MMHG | SYSTOLIC BLOOD PRESSURE: 120 MMHG | BODY MASS INDEX: 30.86 KG/M2 | OXYGEN SATURATION: 97 %

## 2023-10-27 DIAGNOSIS — E11.65 TYPE 2 DIABETES MELLITUS WITH HYPERGLYCEMIA, WITH LONG-TERM CURRENT USE OF INSULIN: Primary | ICD-10-CM

## 2023-10-27 DIAGNOSIS — E11.42 DIABETIC PERIPHERAL NEUROPATHY: ICD-10-CM

## 2023-10-27 DIAGNOSIS — E78.2 MIXED HYPERLIPIDEMIA: ICD-10-CM

## 2023-10-27 DIAGNOSIS — M79.672 ACUTE FOOT PAIN, LEFT: ICD-10-CM

## 2023-10-27 DIAGNOSIS — E53.8 B12 DEFICIENCY: ICD-10-CM

## 2023-10-27 DIAGNOSIS — I10 PRIMARY HYPERTENSION: ICD-10-CM

## 2023-10-27 DIAGNOSIS — Z79.4 TYPE 2 DIABETES MELLITUS WITH HYPERGLYCEMIA, WITH LONG-TERM CURRENT USE OF INSULIN: Primary | ICD-10-CM

## 2023-10-27 DIAGNOSIS — M79.605 ACUTE LEG PAIN, LEFT: ICD-10-CM

## 2023-10-27 DIAGNOSIS — N52.9 ERECTILE DYSFUNCTION, UNSPECIFIED ERECTILE DYSFUNCTION TYPE: ICD-10-CM

## 2023-10-27 LAB
ALBUMIN SERPL-MCNC: 4.4 G/DL (ref 3.5–5.2)
ALBUMIN UR-MCNC: 1.8 MG/DL
ALBUMIN/GLOB SERPL: 1.5 G/DL
ALP SERPL-CCNC: 81 U/L (ref 39–117)
ALT SERPL W P-5'-P-CCNC: 10 U/L (ref 1–41)
ANION GAP SERPL CALCULATED.3IONS-SCNC: 7.6 MMOL/L (ref 5–15)
AST SERPL-CCNC: 13 U/L (ref 1–40)
BASOPHILS # BLD AUTO: 0.1 10*3/MM3 (ref 0–0.2)
BASOPHILS NFR BLD AUTO: 0.9 % (ref 0–1.5)
BILIRUB SERPL-MCNC: 0.4 MG/DL (ref 0–1.2)
BUN SERPL-MCNC: 12 MG/DL (ref 6–20)
BUN/CREAT SERPL: 13 (ref 7–25)
CALCIUM SPEC-SCNC: 9.4 MG/DL (ref 8.6–10.5)
CHLORIDE SERPL-SCNC: 109 MMOL/L (ref 98–107)
CHOLEST SERPL-MCNC: 112 MG/DL (ref 0–200)
CO2 SERPL-SCNC: 28.4 MMOL/L (ref 22–29)
CREAT SERPL-MCNC: 0.92 MG/DL (ref 0.76–1.27)
CREAT UR-MCNC: 85.2 MG/DL
DEPRECATED RDW RBC AUTO: 43.5 FL (ref 37–54)
EGFRCR SERPLBLD CKD-EPI 2021: 96.4 ML/MIN/1.73
EOSINOPHIL # BLD AUTO: 1.01 10*3/MM3 (ref 0–0.4)
EOSINOPHIL NFR BLD AUTO: 8.8 % (ref 0.3–6.2)
ERYTHROCYTE [DISTWIDTH] IN BLOOD BY AUTOMATED COUNT: 13.7 % (ref 12.3–15.4)
FOLATE SERPL-MCNC: 7.43 NG/ML (ref 4.78–24.2)
GLOBULIN UR ELPH-MCNC: 2.9 GM/DL
GLUCOSE SERPL-MCNC: 109 MG/DL (ref 65–99)
HBA1C MFR BLD: 10.1 % (ref 4.8–5.6)
HCT VFR BLD AUTO: 50.9 % (ref 37.5–51)
HDLC SERPL-MCNC: 51 MG/DL (ref 40–60)
HGB BLD-MCNC: 17.1 G/DL (ref 13–17.7)
IMM GRANULOCYTES # BLD AUTO: 0.03 10*3/MM3 (ref 0–0.05)
IMM GRANULOCYTES NFR BLD AUTO: 0.3 % (ref 0–0.5)
LDLC SERPL CALC-MCNC: 45 MG/DL (ref 0–100)
LDLC/HDLC SERPL: 0.87 {RATIO}
LYMPHOCYTES # BLD AUTO: 4.73 10*3/MM3 (ref 0.7–3.1)
LYMPHOCYTES NFR BLD AUTO: 41.2 % (ref 19.6–45.3)
MCH RBC QN AUTO: 29.1 PG (ref 26.6–33)
MCHC RBC AUTO-ENTMCNC: 33.6 G/DL (ref 31.5–35.7)
MCV RBC AUTO: 86.6 FL (ref 79–97)
MICROALBUMIN/CREAT UR: 21.1 MG/G (ref 0–29)
MONOCYTES # BLD AUTO: 0.84 10*3/MM3 (ref 0.1–0.9)
MONOCYTES NFR BLD AUTO: 7.3 % (ref 5–12)
NEUTROPHILS NFR BLD AUTO: 4.78 10*3/MM3 (ref 1.7–7)
NEUTROPHILS NFR BLD AUTO: 41.5 % (ref 42.7–76)
NRBC BLD AUTO-RTO: 0 /100 WBC (ref 0–0.2)
PLATELET # BLD AUTO: 311 10*3/MM3 (ref 140–450)
PMV BLD AUTO: 10.9 FL (ref 6–12)
POTASSIUM SERPL-SCNC: 4.7 MMOL/L (ref 3.5–5.2)
PROT SERPL-MCNC: 7.3 G/DL (ref 6–8.5)
RBC # BLD AUTO: 5.88 10*6/MM3 (ref 4.14–5.8)
SODIUM SERPL-SCNC: 145 MMOL/L (ref 136–145)
T4 FREE SERPL-MCNC: 0.9 NG/DL (ref 0.93–1.7)
TRIGL SERPL-MCNC: 82 MG/DL (ref 0–150)
TSH SERPL DL<=0.05 MIU/L-ACNC: 1.96 UIU/ML (ref 0.27–4.2)
VIT B12 BLD-MCNC: 1873 PG/ML (ref 211–946)
VLDLC SERPL-MCNC: 16 MG/DL (ref 5–40)
WBC NRBC COR # BLD: 11.49 10*3/MM3 (ref 3.4–10.8)

## 2023-10-27 PROCEDURE — 85025 COMPLETE CBC W/AUTO DIFF WBC: CPT | Performed by: NURSE PRACTITIONER

## 2023-10-27 PROCEDURE — 82043 UR ALBUMIN QUANTITATIVE: CPT | Performed by: NURSE PRACTITIONER

## 2023-10-27 PROCEDURE — 80061 LIPID PANEL: CPT | Performed by: NURSE PRACTITIONER

## 2023-10-27 PROCEDURE — 82746 ASSAY OF FOLIC ACID SERUM: CPT | Performed by: NURSE PRACTITIONER

## 2023-10-27 PROCEDURE — 84439 ASSAY OF FREE THYROXINE: CPT | Performed by: NURSE PRACTITIONER

## 2023-10-27 PROCEDURE — 80053 COMPREHEN METABOLIC PANEL: CPT | Performed by: NURSE PRACTITIONER

## 2023-10-27 PROCEDURE — 83036 HEMOGLOBIN GLYCOSYLATED A1C: CPT | Performed by: NURSE PRACTITIONER

## 2023-10-27 PROCEDURE — 84443 ASSAY THYROID STIM HORMONE: CPT | Performed by: NURSE PRACTITIONER

## 2023-10-27 PROCEDURE — 82607 VITAMIN B-12: CPT | Performed by: NURSE PRACTITIONER

## 2023-10-27 PROCEDURE — 82570 ASSAY OF URINE CREATININE: CPT | Performed by: NURSE PRACTITIONER

## 2023-10-27 RX ORDER — LANOLIN ALCOHOL/MO/W.PET/CERES
1000 CREAM (GRAM) TOPICAL DAILY
Qty: 90 TABLET | Refills: 0 | Status: SHIPPED | OUTPATIENT
Start: 2023-10-27

## 2023-10-27 NOTE — PROGRESS NOTES
Venipuncture Blood Specimen Collection  Venipuncture performed in left arm by Mell Gee with good hemostasis. Patient tolerated the procedure well without complications.   10/27/23   Mell Gee

## 2023-10-27 NOTE — PROGRESS NOTES
"Chief Complaint  Diabetes    History of Present Illness  Zechariah Fleming is a 58 y.o. male who presents to [facility] with a past medical history of    Past Medical History:   Diagnosis Date    Arthritis 6/9/2022    Mixed hyperlipidemia 6/9/2022    Primary hypertension 6/9/2022    Type 2 diabetes mellitus with hyperglycemia, with long-term current use of insulin 6/9/2022     3 month follow up on diabetes -     He does not check his blood sugars.  His last A1c was greater than 12%.  His A1c seem to spike after experiencing marital discord.  He does not elaborate, but they do not seem to be resolving their issues.  He did use slander in reference to her when asking how that was going.  He does state that he is taking Jardiance, Januvia daily and he is injecting Tresiba \"about 55 units\" daily, and Trulicity 3 mg once weekly.  No polyuria, polydipsia, or polyphagia.  He does endorse chronic leg pain which has not really been improved with a trial of amitriptyline.  He has chronic ED, not improved with Viagra or Cialis.  He does not want to see urology.    He no showed his appointment with gastroenterology secondary to chronic heartburn and dysphagia.  He does not want to follow-up with them at this time.  He voices understanding that the purpose of the evaluation is to rule out any type of esophageal obstruction or cancer, but at this point in time in his life he does not feel that that is important to him.    He also no showed his appointment for hematology secondary to elevated H&H.  Again, he states that he really does not care.    When questioned about depression, he states that he is not depressed, he is just at a point in his life where \"if I have something that is going to kill me, then so be it, I do not want to see any doctors or specialist, I just want to see you\".  He denies any homicidal ideations or suicidal ideations.  He denies any AVH.    He does endorse new onset left " lower extremity pain.  A few weeks ago he was chopping wood with a wood splitter and a piece of wood flew back and hit him in the leg.  He states initially he thought the leg was broken as it was extremely painful.  Since then he has been able to walk on it, but he states about a week after the wood hit his left lower leg the left foot was completely bruised and swollen.  The swelling has improved and so has the bruising, but some of it is still present.  There is a small wound at the medial aspect of the left lower extremity.    Objective   Vital Signs:   Vitals:    10/27/23 1131   BP: 120/80   Pulse: 78   Temp: 97.1 °F (36.2 °C)   SpO2: 97%   Weight: 90.7 kg (200 lb)     Wt Readings from Last 3 Encounters:   10/27/23 90.7 kg (200 lb)   08/09/23 88.9 kg (196 lb)   07/18/23 85.7 kg (189 lb)     BP Readings from Last 3 Encounters:   10/27/23 120/80   08/09/23 120/80   07/18/23 120/80     Physical Exam  Vitals reviewed.   Constitutional:       General: He is not in acute distress.     Appearance: He is well-developed. He is obese.   HENT:      Head: Normocephalic and atraumatic.   Eyes:      General: No scleral icterus.     Extraocular Movements: Extraocular movements intact.      Conjunctiva/sclera: Conjunctivae normal.   Neck:      Thyroid: No thyroid mass, thyromegaly or thyroid tenderness.      Vascular: No carotid bruit.      Trachea: Trachea normal.   Cardiovascular:      Rate and Rhythm: Normal rate and regular rhythm.      Pulses: Normal pulses.      Heart sounds: No murmur heard.  Pulmonary:      Effort: Pulmonary effort is normal. No respiratory distress.      Breath sounds: Normal breath sounds. No wheezing, rhonchi or rales.   Musculoskeletal:         General: Normal range of motion.      Cervical back: Normal range of motion and neck supple. No tenderness.      Right lower leg: No edema.      Left lower leg: Swelling (mild swelling of the distal portion of the LLE; no significant erythema. Superficial  wound present.), laceration and tenderness present. No bony tenderness. No edema.      Left foot: Normal range of motion. Swelling (mild with evidence of ecchymosis) present. No deformity, tenderness or bony tenderness.   Lymphadenopathy:      Cervical: No cervical adenopathy.   Skin:     General: Skin is warm and dry.   Neurological:      Mental Status: He is alert and oriented to person, place, and time.   Psychiatric:         Mood and Affect: Mood and affect normal.         Behavior: Behavior normal.         Thought Content: Thought content normal.         Judgment: Judgment normal.         Result Review :  The following data was reviewed by: BIENVENIDO Ross on 10/27/2023:    No visits with results within 1 Month(s) from this visit.   Latest known visit with results is:   Office Visit on 08/09/2023   Component Date Value    Erythropoietin 08/09/2023 7.1     Reference Lab Report 08/09/2023 See separate report     WBC 08/09/2023 9.37     RBC 08/09/2023 5.83 (H)     Hemoglobin 08/09/2023 16.9     Hematocrit 08/09/2023 50.8     MCV 08/09/2023 87.1     MCH 08/09/2023 29.0     MCHC 08/09/2023 33.3     RDW 08/09/2023 13.4     RDW-SD 08/09/2023 41.9     MPV 08/09/2023 11.1     Platelets 08/09/2023 263     Neutrophil % 08/09/2023 39.0 (L)     Lymphocyte % 08/09/2023 43.0     Monocyte % 08/09/2023 9.0     Eosinophil % 08/09/2023 9.0 (H)     Neutrophils Absolute 08/09/2023 3.65     Lymphocytes Absolute 08/09/2023 4.03 (H)     Monocytes Absolute 08/09/2023 0.84     Eosinophils Absolute 08/09/2023 0.84 (H)     RBC Morphology 08/09/2023 Normal     WBC Morphology 08/09/2023 Normal     Platelet Morphology 08/09/2023 Normal      Common labs          4/13/2023    08:24 4/13/2023    08:37 7/18/2023    11:02 8/9/2023    11:24   Common Labs   Glucose 341   276     BUN 16   9     Creatinine 1.12   0.78     Sodium 133   136     Potassium 4.6   4.2     Chloride 98   102     Calcium 9.6   9.6     Albumin 4.6   4.2     Total  Bilirubin 0.3   0.4     Alkaline Phosphatase 96   93     AST (SGOT) 16   21     ALT (SGPT) 13   16     WBC 13.20   11.09  9.37    Hemoglobin 18.7   18.5  16.9    Hematocrit 55.7   54.8  50.8    Platelets 319   277  263    Total Cholesterol 236   176     Triglycerides 392   294     HDL Cholesterol 48   42     LDL Cholesterol  119   86     Hemoglobin A1C 8.80   12.50     Microalbumin, Urine  6.6      PSA   0.634       XR Foot 3+ View Left (In Office)    Result Date: 10/27/2023      No acute bony injury.    FLIP ROCK MD       Electronically Signed and Approved By: FLIP ROCK MD on 10/27/2023 at 12:40             XR Tibia Fibula 2 View Left (In Office)    Result Date: 10/27/2023   No acute bony injury.      FLIP ROCK MD       Electronically Signed and Approved By: FLIP ROCK MD on 10/27/2023 at 12:30               Procedures        Assessment and Plan   Diagnoses and all orders for this visit:    1. Type 2 diabetes mellitus with hyperglycemia, with long-term current use of insulin (Primary)  -     CBC Auto Differential  -     Comprehensive Metabolic Panel  -     Hemoglobin A1c  -     Lipid Panel  -     TSH+Free T4  -     Microalbumin / Creatinine Urine Ratio - Urine, Clean Catch    2. Diabetic peripheral neuropathy    3. Erectile dysfunction, unspecified erectile dysfunction type    4. Primary hypertension  -     CBC Auto Differential  -     Comprehensive Metabolic Panel  -     Lipid Panel  -     TSH+Free T4  -     Microalbumin / Creatinine Urine Ratio - Urine, Clean Catch    5. Mixed hyperlipidemia  -     Comprehensive Metabolic Panel  -     Lipid Panel    6. B12 deficiency  -     Vitamin B12 & Folate  -     vitamin B-12 (CYANOCOBALAMIN) 1000 MCG tablet; Take 1 tablet by mouth Daily.  Dispense: 90 tablet; Refill: 0    7. Acute leg pain, left  -     XR Tibia Fibula 2 View Left (In Office)  -     oxaprozin (Daypro) 600 MG tablet; Take 2 tablets by mouth Daily.  Dispense: 60 tablet; Refill: 0    8. Acute foot pain,  left  -     XR Foot 3+ View Left (In Office)  -     oxaprozin (Daypro) 600 MG tablet; Take 2 tablets by mouth Daily.  Dispense: 60 tablet; Refill: 0                  FOLLOW UP  Return for recheck in 3-6 months pending outcome of A1c.    We will get repeat labs today.  Medication refills were submitted earlier this week to prevent running out.  I will make further adjustments pending the outcome of his A1c.  If still elevated then we may need to go ahead and increase his Tresiba.  He does not check his blood sugar and does not plan on starting checking his blood sugar.  He does not want to see urology for the ED.  He will let me know if he changes his mind regarding this.    In regards to the leg and foot pain, x-rays are negative.  Neosporin to the superficial wound.  This is healing up without any infection present on exam.  I will give him a trial of oxaprozin for pain.  He will stop his naproxen, and avoid any over-the-counter NSAIDs.  He may take Tylenol in addition to oxaprozin.  Follow-up if no improvement or with worsening symptoms.      Patient was given instructions and counseling regarding his condition or for health maintenance advice. Please see specific information pulled into the AVS if appropriate.       Brittni Birmingham, APRN  10/27/23  13:20 EDT    CURRENT & DISCONTINUED MEDICATIONS  Current Outpatient Medications   Medication Instructions    atenolol (TENORMIN) 25 mg, Oral, Daily    empagliflozin (JARDIANCE) 25 mg, Oral, Daily    ezetimibe (ZETIA) 10 mg, Oral, Daily    famotidine (PEPCID) 20 MG tablet TAKE 1 TABLET BY MOUTH TWICE DAILY FOR HEARTBURN    glucose blood (FREESTYLE LITE) test strip 1 each, Other, Daily, To check fasting    lisinopril (PRINIVIL,ZESTRIL) 5 mg, Oral, Daily    oxaprozin (DAYPRO) 1,200 mg, Oral, Daily    pravastatin (PRAVACHOL) 80 mg, Oral, Daily    SITagliptin (JANUVIA) 100 mg, Oral, Daily    Tresiba FlexTouch 54 Units, Subcutaneous, Daily    Trulicity 3 MG/0.5ML  solution pen-injector ADMINISTER 0.5 ML UNDER THE SKIN 1 TIME EVERY WEEK AS DIRECTED    vitamin B-12 (CYANOCOBALAMIN) 1,000 mcg, Oral, Daily       Medications Discontinued During This Encounter   Medication Reason    tadalafil (Cialis) 10 MG tablet Not Efficacious    vitamin B-12 (CYANOCOBALAMIN) 1000 MCG tablet Reorder    naproxen (NAPROSYN) 500 MG tablet Not Efficacious

## 2023-12-04 DIAGNOSIS — M79.672 ACUTE FOOT PAIN, LEFT: ICD-10-CM

## 2023-12-04 DIAGNOSIS — M79.605 ACUTE LEG PAIN, LEFT: ICD-10-CM

## 2023-12-22 ENCOUNTER — TELEPHONE (OUTPATIENT)
Dept: FAMILY MEDICINE CLINIC | Facility: CLINIC | Age: 58
End: 2023-12-22
Payer: MEDICARE

## 2023-12-22 DIAGNOSIS — E11.65 TYPE 2 DIABETES MELLITUS WITH HYPERGLYCEMIA, WITH LONG-TERM CURRENT USE OF INSULIN: ICD-10-CM

## 2023-12-22 DIAGNOSIS — Z79.4 TYPE 2 DIABETES MELLITUS WITH HYPERGLYCEMIA, WITH LONG-TERM CURRENT USE OF INSULIN: ICD-10-CM

## 2023-12-22 NOTE — TELEPHONE ENCOUNTER
Called wife back again and explained to her that we need more information.  She can have insurance 3 way call our office so we can know exactly what is needed.

## 2023-12-22 NOTE — TELEPHONE ENCOUNTER
Caller: OTTO GUTIERREZ    Relationship to patient: Emergency Contact    Best call back number: [phone]    Patient is needing: PATIENT'S WIFE CALLED IN AND SAID INSURANCE IS NEEDING PRIOR AUTHORIZATION TO SEND PRESCRIPTIONS FOR DIABETES MEDICATION THROUGH  MAIL ORDER. PATIENT'S WIFE GAVE PHARMACY CONTACT NUMBER AS [phone]

## 2023-12-26 ENCOUNTER — OFFICE VISIT (OUTPATIENT)
Dept: FAMILY MEDICINE CLINIC | Facility: CLINIC | Age: 58
End: 2023-12-26
Payer: MEDICARE

## 2023-12-26 VITALS
TEMPERATURE: 97.3 F | OXYGEN SATURATION: 97 % | WEIGHT: 203 LBS | HEART RATE: 90 BPM | HEIGHT: 68 IN | BODY MASS INDEX: 30.77 KG/M2 | SYSTOLIC BLOOD PRESSURE: 120 MMHG | DIASTOLIC BLOOD PRESSURE: 78 MMHG

## 2023-12-26 DIAGNOSIS — R05.1 ACUTE COUGH: ICD-10-CM

## 2023-12-26 DIAGNOSIS — J20.9 ACUTE BRONCHITIS, UNSPECIFIED ORGANISM: Primary | ICD-10-CM

## 2023-12-26 RX ORDER — DULAGLUTIDE 3 MG/.5ML
INJECTION, SOLUTION SUBCUTANEOUS
Qty: 6 ML | Refills: 0 | Status: SHIPPED | OUTPATIENT
Start: 2023-12-26

## 2023-12-26 RX ORDER — NAPROXEN 500 MG/1
1 TABLET ORAL DAILY
COMMUNITY
Start: 2023-12-22

## 2023-12-26 RX ORDER — PREDNISONE 5 MG/1
TABLET ORAL
Qty: 1 EACH | Refills: 0 | Status: SHIPPED | OUTPATIENT
Start: 2023-12-26

## 2023-12-26 RX ORDER — CEFTRIAXONE 1 G/1
1 INJECTION, POWDER, FOR SOLUTION INTRAMUSCULAR; INTRAVENOUS ONCE
Status: COMPLETED | OUTPATIENT
Start: 2023-12-26 | End: 2023-12-26

## 2023-12-26 RX ORDER — AZITHROMYCIN 250 MG/1
TABLET, FILM COATED ORAL
Qty: 6 TABLET | Refills: 0 | Status: SHIPPED | OUTPATIENT
Start: 2023-12-26

## 2023-12-26 RX ADMIN — CEFTRIAXONE 1 G: 1 INJECTION, POWDER, FOR SOLUTION INTRAMUSCULAR; INTRAVENOUS at 10:37

## 2023-12-26 NOTE — PROGRESS NOTES
Chief Complaint  Nasal Congestion (Severe congestion at night, can not sleep) and Cough (All going on for about 2 + weeks)    Subjective        Past Medical History:   Diagnosis Date    Arthritis 6/9/2022    Mixed hyperlipidemia 6/9/2022    Primary hypertension 6/9/2022    Type 2 diabetes mellitus with hyperglycemia, with long-term current use of insulin 6/9/2022     Social History     Tobacco Use    Smoking status: Every Day     Packs/day: 1.00     Years: 42.00     Additional pack years: 0.00     Total pack years: 42.00     Types: Cigarettes     Start date: 1981    Smokeless tobacco: Never   Vaping Use    Vaping Use: Never used   Substance Use Topics    Alcohol use: Never    Drug use: Never      Current Outpatient Medications on File Prior to Visit   Medication Sig    atenolol (TENORMIN) 25 MG tablet Take 1 tablet by mouth Daily.    empagliflozin (Jardiance) 25 MG tablet tablet Take 1 tablet by mouth Daily.    ezetimibe (Zetia) 10 MG tablet Take 1 tablet by mouth Daily.    famotidine (PEPCID) 20 MG tablet TAKE 1 TABLET BY MOUTH TWICE DAILY FOR HEARTBURN    glucose blood (FREESTYLE LITE) test strip 1 each by Other route Daily. To check fasting    Insulin Degludec (Tresiba FlexTouch) 200 UNIT/ML solution pen-injector pen injection Inject 54 Units under the skin into the appropriate area as directed Daily.    lisinopril (PRINIVIL,ZESTRIL) 5 MG tablet Take 1 tablet by mouth Daily.    naproxen (NAPROSYN) 500 MG tablet Take 1 tablet by mouth Daily.    oxaprozin (Daypro) 600 MG tablet Take 2 tablets by mouth Daily.    pravastatin (PRAVACHOL) 80 MG tablet Take 1 tablet by mouth Daily.    SITagliptin (Januvia) 100 MG tablet Take 1 tablet by mouth Daily.    Trulicity 3 MG/0.5ML solution pen-injector ADMINISTER 3 MG UNDER THE SKIN 1 TIME EVERY WEEK AS DIRECTED    vitamin B-12 (CYANOCOBALAMIN) 1000 MCG tablet Take 1 tablet by mouth Daily.    [DISCONTINUED] Trulicity 3 MG/0.5ML solution pen-injector ADMINISTER 0.5 ML UNDER THE  "SKIN 1 TIME EVERY WEEK AS DIRECTED     No current facility-administered medications on file prior to visit.      No Known Allergies   Health Maintenance Due   Topic Date Due    Hepatitis B (1 of 3 - 3-dose series) Never done    TDAP/TD VACCINES (1 - Tdap) Never done    DIABETIC EYE EXAM  07/12/2023    LUNG CANCER SCREENING  09/19/2023      Zechariah Fleming presents to [facility]  History of Present Illness  Here with sinus congestion and drainage x 2-3 weeks. Notes cough and congestion is worse at night and keeps him up at night. He has been taking Mucinex. He feels like he had drainage in the chest but cannot get it out. Has chronic wheezing. No inhaler use.   He had been smoking over 40 years, about 1.5ppd.     Denies fever, chills, body aches, headache, nausea, vomiting, or diarrhea.   Objective   Vital Signs:   /78 (BP Location: Left arm)   Pulse 90   Temp 97.3 °F (36.3 °C)   Ht 171.5 cm (67.5\")   Wt 92.1 kg (203 lb)   SpO2 97%   BMI 31.33 kg/m²     Review of Systems   Physical Exam  Vitals reviewed.   Constitutional:       General: He is not in acute distress.     Appearance: Normal appearance. He is well-developed.   HENT:      Head: Normocephalic and atraumatic.      Right Ear: External ear normal.      Left Ear: External ear normal.   Eyes:      Conjunctiva/sclera: Conjunctivae normal.      Pupils: Pupils are equal, round, and reactive to light.   Cardiovascular:      Rate and Rhythm: Normal rate and regular rhythm.      Heart sounds: Normal heart sounds.   Pulmonary:      Effort: Pulmonary effort is normal.      Breath sounds: Wheezing present.   Musculoskeletal:      Cervical back: Neck supple.   Skin:     General: Skin is warm and dry.   Neurological:      Mental Status: He is alert and oriented to person, place, and time.   Psychiatric:         Mood and Affect: Mood and affect normal.         Behavior: Behavior normal.         Thought Content: Thought content " normal.         Judgment: Judgment normal.        Result Review :   The following data was reviewed by: BIENVENIDO Davis on 12/26/2023:    Data reviewed : Radiologic studies CXR           Assessment and Plan    Diagnoses and all orders for this visit:    1. Acute bronchitis, unspecified organism (Primary)  -     cefTRIAXone (ROCEPHIN) injection 1 g  -     azithromycin (Zithromax Z-Casimiro) 250 MG tablet; Take 2 tablets by mouth on day 1, then 1 tablet daily on days 2-5  Dispense: 6 tablet; Refill: 0  -     predniSONE 5 MG (21) tablet therapy pack dose pack; Take as directed on package instructions.  Dispense: 1 each; Refill: 0    2. Acute cough  -     XR Chest PA & Lateral (In Office)        Will notify of CXR read by Radiology.      Patient states he is willing to have a low-dose CT scan done early in the morning towards the end of January at the diagnostic center.  Will discuss with referral/scheduling.    Follow Up   Return if symptoms worsen or fail to improve.  Patient was given instructions and counseling regarding his condition or for health maintenance advice. Please see specific information pulled into the AVS if appropriate.

## 2024-01-03 ENCOUNTER — OFFICE VISIT (OUTPATIENT)
Dept: FAMILY MEDICINE CLINIC | Facility: CLINIC | Age: 59
End: 2024-01-03
Payer: MEDICARE

## 2024-01-03 VITALS
OXYGEN SATURATION: 98 % | HEART RATE: 137 BPM | WEIGHT: 202 LBS | HEIGHT: 67 IN | BODY MASS INDEX: 31.71 KG/M2 | SYSTOLIC BLOOD PRESSURE: 110 MMHG | DIASTOLIC BLOOD PRESSURE: 70 MMHG

## 2024-01-03 DIAGNOSIS — R09.81 NASAL CONGESTION WITH RHINORRHEA: ICD-10-CM

## 2024-01-03 DIAGNOSIS — J34.89 NASAL CONGESTION WITH RHINORRHEA: ICD-10-CM

## 2024-01-03 DIAGNOSIS — J20.9 SUBACUTE BRONCHITIS: Primary | ICD-10-CM

## 2024-01-03 DIAGNOSIS — R05.2 SUBACUTE COUGH: ICD-10-CM

## 2024-01-03 PROCEDURE — 1160F RVW MEDS BY RX/DR IN RCRD: CPT | Performed by: NURSE PRACTITIONER

## 2024-01-03 PROCEDURE — 99213 OFFICE O/P EST LOW 20 MIN: CPT | Performed by: NURSE PRACTITIONER

## 2024-01-03 PROCEDURE — 3078F DIAST BP <80 MM HG: CPT | Performed by: NURSE PRACTITIONER

## 2024-01-03 PROCEDURE — 1159F MED LIST DOCD IN RCRD: CPT | Performed by: NURSE PRACTITIONER

## 2024-01-03 PROCEDURE — 3074F SYST BP LT 130 MM HG: CPT | Performed by: NURSE PRACTITIONER

## 2024-01-03 RX ORDER — GUAIFENESIN 600 MG/1
1200 TABLET, EXTENDED RELEASE ORAL 2 TIMES DAILY
Qty: 40 TABLET | Refills: 0 | Status: SHIPPED | OUTPATIENT
Start: 2024-01-03

## 2024-01-03 RX ORDER — FLUTICASONE PROPIONATE 50 MCG
SPRAY, SUSPENSION (ML) NASAL
Qty: 48 G | OUTPATIENT
Start: 2024-01-03

## 2024-01-03 RX ORDER — FLUTICASONE PROPIONATE 50 MCG
2 SPRAY, SUSPENSION (ML) NASAL DAILY
Qty: 16 G | Refills: 0 | Status: SHIPPED | OUTPATIENT
Start: 2024-01-03

## 2024-01-03 RX ORDER — CETIRIZINE HYDROCHLORIDE 10 MG/1
10 TABLET ORAL DAILY
Qty: 30 TABLET | Refills: 0 | Status: SHIPPED | OUTPATIENT
Start: 2024-01-03

## 2024-01-03 RX ORDER — FLUTICASONE FUROATE AND VILANTEROL 100; 25 UG/1; UG/1
1 POWDER RESPIRATORY (INHALATION)
Qty: 60 EACH | Refills: 0 | Status: SHIPPED | OUTPATIENT
Start: 2024-01-03

## 2024-01-03 NOTE — PROGRESS NOTES
"Chief Complaint  Cough      History of Present Illness  Zechariah Fleming is a 58 y.o. male who presents to [facility] with a past medical history of    Past Medical History:   Diagnosis Date    Arthritis 6/9/2022    Mixed hyperlipidemia 6/9/2022    Primary hypertension 6/9/2022    Type 2 diabetes mellitus with hyperglycemia, with long-term current use of insulin 6/9/2022     Vipul presents to the office today for follow-up on recent cough/URI.  He was seen last week, the day after [place], and treated for acute bronchitis.  He received a Rocephin injection, in addition to Z-Casimiro and steroid pack.  Today, he denies any fever, chills, or bodyaches.  He is having nasal congestion with rhinorrhea and postnasal drainage, as well as persistent cough.  Cough is productive of clear sputum.  He does not feel the medications have helped with his cough whatsoever.  He denies any nausea, vomiting, or abdominal pain.  No diarrhea.  He has some ear pain on the right, but not on the left.  No sore throat.    Objective   Vital Signs:   Vitals:    01/03/24 1126   BP: 110/70   Pulse: (!) 137   SpO2: 98%   Weight: 91.6 kg (202 lb)   Height: 170.2 cm (67\")     Body mass index is 31.64 kg/m².    Wt Readings from Last 3 Encounters:   01/03/24 91.6 kg (202 lb)   12/26/23 92.1 kg (203 lb)   10/27/23 90.7 kg (200 lb)     BP Readings from Last 3 Encounters:   01/03/24 110/70   12/26/23 120/78   10/27/23 120/80       Health Maintenance   Topic Date Due    Hepatitis B (1 of 3 - 3-dose series) Never done    TDAP/TD VACCINES (1 - Tdap) Never done    DIABETIC EYE EXAM  07/12/2023    LUNG CANCER SCREENING  09/19/2023    DIABETIC FOOT EXAM  12/30/2023    HEMOGLOBIN A1C  04/27/2024    ANNUAL WELLNESS VISIT  07/18/2024    BMI FOLLOWUP  07/18/2024    LIPID PANEL  10/27/2024    URINE MICROALBUMIN  10/27/2024    COLORECTAL CANCER SCREENING  09/11/2025    HEPATITIS C SCREENING  Completed    Pneumococcal Vaccine 0-64  " Completed    INFLUENZA VACCINE  Completed    COVID-19 Vaccine  Discontinued    ZOSTER VACCINE  Discontinued       Physical Exam  Vitals reviewed.   Constitutional:       General: He is not in acute distress.     Appearance: He is well-developed. He is obese.   HENT:      Head: Normocephalic and atraumatic.      Right Ear: Ear canal and external ear normal. Tympanic membrane is retracted. Tympanic membrane is not perforated, erythematous or bulging.      Left Ear: Ear canal and external ear normal. Tympanic membrane is retracted. Tympanic membrane is not perforated, erythematous or bulging.      Nose: Congestion present. No rhinorrhea.      Mouth/Throat:      Mouth: Mucous membranes are moist.      Pharynx: Oropharynx is clear. Oropharyngeal exudate present. No posterior oropharyngeal erythema.      Comments: Mild amount of clear drainage/exudate in the posterior oropharynx  Eyes:      General: No scleral icterus.        Right eye: No discharge.         Left eye: No discharge.      Extraocular Movements: Extraocular movements intact.      Conjunctiva/sclera: Conjunctivae normal.   Neck:      Trachea: Trachea normal.   Cardiovascular:      Rate and Rhythm: Normal rate and regular rhythm.      Pulses: Normal pulses.      Heart sounds: No murmur heard.  Pulmonary:      Effort: Pulmonary effort is normal. No respiratory distress.      Breath sounds: Normal breath sounds. No wheezing, rhonchi or rales.      Comments: Cough witnessed on exam.  Clear sputum expectorated.  Musculoskeletal:         General: Normal range of motion.      Cervical back: Normal range of motion and neck supple. No tenderness.      Right lower leg: No edema.      Left lower leg: No edema.   Lymphadenopathy:      Cervical: No cervical adenopathy.   Skin:     General: Skin is warm and dry.   Neurological:      Mental Status: He is alert and oriented to person, place, and time.   Psychiatric:         Mood and Affect: Mood and affect normal.          Behavior: Behavior normal.         Thought Content: Thought content normal.         Judgment: Judgment normal.         Result Review :  The following data was reviewed by: BIENVENIDO Ross on 01/03/2024:    No visits with results within 1 Month(s) from this visit.   Latest known visit with results is:   Office Visit on 10/27/2023   Component Date Value    WBC 10/27/2023 11.49 (H)     RBC 10/27/2023 5.88 (H)     Hemoglobin 10/27/2023 17.1     Hematocrit 10/27/2023 50.9     MCV 10/27/2023 86.6     MCH 10/27/2023 29.1     MCHC 10/27/2023 33.6     RDW 10/27/2023 13.7     RDW-SD 10/27/2023 43.5     MPV 10/27/2023 10.9     Platelets 10/27/2023 311     Neutrophil % 10/27/2023 41.5 (L)     Lymphocyte % 10/27/2023 41.2     Monocyte % 10/27/2023 7.3     Eosinophil % 10/27/2023 8.8 (H)     Basophil % 10/27/2023 0.9     Immature Grans % 10/27/2023 0.3     Neutrophils, Absolute 10/27/2023 4.78     Lymphocytes, Absolute 10/27/2023 4.73 (H)     Monocytes, Absolute 10/27/2023 0.84     Eosinophils, Absolute 10/27/2023 1.01 (H)     Basophils, Absolute 10/27/2023 0.10     Immature Grans, Absolute 10/27/2023 0.03     nRBC 10/27/2023 0.0     Glucose 10/27/2023 109 (H)     BUN 10/27/2023 12     Creatinine 10/27/2023 0.92     Sodium 10/27/2023 145     Potassium 10/27/2023 4.7     Chloride 10/27/2023 109 (H)     CO2 10/27/2023 28.4     Calcium 10/27/2023 9.4     Total Protein 10/27/2023 7.3     Albumin 10/27/2023 4.4     ALT (SGPT) 10/27/2023 10     AST (SGOT) 10/27/2023 13     Alkaline Phosphatase 10/27/2023 81     Total Bilirubin 10/27/2023 0.4     Globulin 10/27/2023 2.9     A/G Ratio 10/27/2023 1.5     BUN/Creatinine Ratio 10/27/2023 13.0     Anion Gap 10/27/2023 7.6     eGFR 10/27/2023 96.4     Hemoglobin A1C 10/27/2023 10.10 (H)     Total Cholesterol 10/27/2023 112     Triglycerides 10/27/2023 82     HDL Cholesterol 10/27/2023 51     LDL Cholesterol  10/27/2023 45     VLDL Cholesterol 10/27/2023 16     LDL/HDL Ratio  10/27/2023 0.87     TSH 10/27/2023 1.960     Free T4 10/27/2023 0.90 (L)     Microalbumin/Creatinine * 10/27/2023 21.1     Creatinine, Urine 10/27/2023 85.2     Microalbumin, Urine 10/27/2023 1.8     Folate 10/27/2023 7.43     Vitamin B-12 10/27/2023 1,873 (H)      XR Chest PA & Lateral (In Office)    Result Date: 12/26/2023    1. There are suggested findings of granulomatous exposure involving the left hemithorax.     ALAN LA MD       Electronically Signed and Approved By: ALAN LA MD on 12/26/2023 at 14:02                   Procedures        Assessment and Plan   Diagnoses and all orders for this visit:    1. Subacute bronchitis (Primary)  -     Fluticasone Furoate-Vilanterol (Breo Ellipta) 100-25 MCG/ACT aerosol powder ; Inhale 1 puff Daily.  Dispense: 60 each; Refill: 0  -     guaiFENesin (Mucinex) 600 MG 12 hr tablet; Take 2 tablets by mouth 2 (Two) Times a Day.  Dispense: 40 tablet; Refill: 0    2. Subacute cough  -     Cancel: COVID-19, FLU A/B, RSV PCR 1 HR TAT - Swab, Nasopharynx  -     guaiFENesin (Mucinex) 600 MG 12 hr tablet; Take 2 tablets by mouth 2 (Two) Times a Day.  Dispense: 40 tablet; Refill: 0    3. Nasal congestion with rhinorrhea  -     Cancel: COVID-19, FLU A/B, RSV PCR 1 HR TAT - Swab, Nasopharynx  -     cetirizine (zyrTEC) 10 MG tablet; Take 1 tablet by mouth Daily.  Dispense: 30 tablet; Refill: 0  -     fluticasone (FLONASE) 50 MCG/ACT nasal spray; 2 sprays into the nostril(s) as directed by provider Daily.  Dispense: 16 g; Refill: 0                  FOLLOW UP  Return if symptoms worsen or fail to improve in 7-10 days.    I suspect viral illness as his symptoms are essentially unchanged with Rocephin IM and azithromycin.  I am hesitant to administer prednisone since he just finished a steroid pack without significant relief of symptoms and his most recent A1c was greater than 10%.  He is not checking his blood sugar daily.  Lung sounds are clear on exam.  Recent chest x-ray a week  ago showed no acute findings.  He has a CT scheduled for the end of the month.  He is not in respiratory distress.  I am going to give him a trial of Breo.  I have stressed the importance of rinsing his mouth after use.  I will also give him Zyrtec, Flonase, and Mucinex to help with his symptoms.  Robitussin as needed for cough.  Follow-up in 7 to 10 days if symptoms or not improving.    Patient was given instructions and counseling regarding his condition or for health maintenance advice. Please see specific information pulled into the AVS if appropriate.       Brittni Birmingham, APRN  01/03/24  11:58 EST    CURRENT & DISCONTINUED MEDICATIONS  Current Outpatient Medications   Medication Instructions    atenolol (TENORMIN) 25 mg, Oral, Daily    cetirizine (ZYRTEC) 10 mg, Oral, Daily    empagliflozin (JARDIANCE) 25 mg, Oral, Daily    ezetimibe (ZETIA) 10 mg, Oral, Daily    famotidine (PEPCID) 20 MG tablet TAKE 1 TABLET BY MOUTH TWICE DAILY FOR HEARTBURN    fluticasone (FLONASE) 50 MCG/ACT nasal spray 2 sprays, Nasal, Daily    Fluticasone Furoate-Vilanterol (Breo Ellipta) 100-25 MCG/ACT aerosol powder  1 puff, Inhalation, Daily - RT    glucose blood (FREESTYLE LITE) test strip 1 each, Other, Daily, To check fasting    guaiFENesin (MUCINEX) 1,200 mg, Oral, 2 Times Daily    lisinopril (PRINIVIL,ZESTRIL) 5 mg, Oral, Daily    naproxen (NAPROSYN) 500 MG tablet 1 tablet, Oral, Daily    oxaprozin (DAYPRO) 1,200 mg, Oral, Daily    pravastatin (PRAVACHOL) 80 mg, Oral, Daily    SITagliptin (JANUVIA) 100 mg, Oral, Daily    Tresiba FlexTouch 54 Units, Subcutaneous, Daily    Trulicity 3 MG/0.5ML solution pen-injector ADMINISTER 3 MG UNDER THE SKIN 1 TIME EVERY WEEK AS DIRECTED    vitamin B-12 (CYANOCOBALAMIN) 1,000 mcg, Oral, Daily       Medications Discontinued During This Encounter   Medication Reason    azithromycin (Zithromax Z-Casimiro) 250 MG tablet *Therapy completed    predniSONE 5 MG (21) tablet therapy pack dose pack  *Therapy completed

## 2024-01-29 ENCOUNTER — HOSPITAL ENCOUNTER (OUTPATIENT)
Dept: CT IMAGING | Facility: HOSPITAL | Age: 59
Discharge: HOME | End: 2024-01-29
Admitting: NURSE PRACTITIONER
Payer: MEDICARE

## 2024-01-29 PROCEDURE — 71271 CT THORAX LUNG CANCER SCR C-: CPT

## 2024-02-14 ENCOUNTER — TELEPHONE (OUTPATIENT)
Dept: FAMILY MEDICINE CLINIC | Facility: CLINIC | Age: 59
End: 2024-02-14
Payer: MEDICARE

## 2024-02-14 DIAGNOSIS — E11.65 TYPE 2 DIABETES MELLITUS WITH HYPERGLYCEMIA, WITH LONG-TERM CURRENT USE OF INSULIN: ICD-10-CM

## 2024-02-14 DIAGNOSIS — I10 PRIMARY HYPERTENSION: ICD-10-CM

## 2024-02-14 DIAGNOSIS — J20.9 SUBACUTE BRONCHITIS: ICD-10-CM

## 2024-02-14 DIAGNOSIS — R12 CHRONIC HEARTBURN: ICD-10-CM

## 2024-02-14 DIAGNOSIS — E78.2 MIXED HYPERLIPIDEMIA: ICD-10-CM

## 2024-02-14 DIAGNOSIS — Z79.4 TYPE 2 DIABETES MELLITUS WITH HYPERGLYCEMIA, WITH LONG-TERM CURRENT USE OF INSULIN: ICD-10-CM

## 2024-02-14 DIAGNOSIS — R13.10 DYSPHAGIA, UNSPECIFIED TYPE: ICD-10-CM

## 2024-02-14 DIAGNOSIS — E53.8 B12 DEFICIENCY: ICD-10-CM

## 2024-02-15 RX ORDER — LANOLIN ALCOHOL/MO/W.PET/CERES
1000 CREAM (GRAM) TOPICAL DAILY
Qty: 90 TABLET | Refills: 1 | Status: SHIPPED | OUTPATIENT
Start: 2024-02-15

## 2024-02-15 RX ORDER — DULAGLUTIDE 3 MG/.5ML
3 INJECTION, SOLUTION SUBCUTANEOUS WEEKLY
Qty: 6 ML | Refills: 0 | Status: SHIPPED | OUTPATIENT
Start: 2024-02-15

## 2024-02-15 RX ORDER — FLUTICASONE FUROATE AND VILANTEROL 100; 25 UG/1; UG/1
1 POWDER RESPIRATORY (INHALATION)
Qty: 180 EACH | Refills: 1 | Status: SHIPPED | OUTPATIENT
Start: 2024-02-15

## 2024-02-15 RX ORDER — LISINOPRIL 5 MG/1
5 TABLET ORAL DAILY
Qty: 90 TABLET | Refills: 1 | Status: SHIPPED | OUTPATIENT
Start: 2024-02-15

## 2024-02-15 RX ORDER — EZETIMIBE 10 MG/1
10 TABLET ORAL DAILY
Qty: 90 TABLET | Refills: 1 | Status: SHIPPED | OUTPATIENT
Start: 2024-02-15

## 2024-02-15 RX ORDER — FAMOTIDINE 20 MG/1
TABLET, FILM COATED ORAL
Qty: 180 TABLET | Refills: 1 | Status: SHIPPED | OUTPATIENT
Start: 2024-02-15

## 2024-02-15 RX ORDER — PRAVASTATIN SODIUM 80 MG/1
80 TABLET ORAL DAILY
Qty: 90 TABLET | Refills: 1 | Status: SHIPPED | OUTPATIENT
Start: 2024-02-15

## 2024-02-15 RX ORDER — ATENOLOL 25 MG/1
25 TABLET ORAL DAILY
Qty: 90 TABLET | Refills: 1 | Status: SHIPPED | OUTPATIENT
Start: 2024-02-15

## 2024-02-15 RX ORDER — INSULIN DEGLUDEC 200 U/ML
54 INJECTION, SOLUTION SUBCUTANEOUS DAILY
Qty: 27 ML | Refills: 0 | Status: SHIPPED | OUTPATIENT
Start: 2024-02-15

## 2024-02-28 ENCOUNTER — OFFICE VISIT (OUTPATIENT)
Dept: FAMILY MEDICINE CLINIC | Facility: CLINIC | Age: 59
End: 2024-02-28
Payer: MEDICARE

## 2024-02-28 VITALS
HEART RATE: 76 BPM | WEIGHT: 196 LBS | DIASTOLIC BLOOD PRESSURE: 70 MMHG | TEMPERATURE: 98 F | SYSTOLIC BLOOD PRESSURE: 110 MMHG | BODY MASS INDEX: 30.7 KG/M2 | OXYGEN SATURATION: 96 %

## 2024-02-28 DIAGNOSIS — R13.10 DYSPHAGIA, UNSPECIFIED TYPE: ICD-10-CM

## 2024-02-28 DIAGNOSIS — E78.2 MIXED HYPERLIPIDEMIA: ICD-10-CM

## 2024-02-28 DIAGNOSIS — R12 CHRONIC HEARTBURN: ICD-10-CM

## 2024-02-28 DIAGNOSIS — E11.42 DIABETIC PERIPHERAL NEUROPATHY: ICD-10-CM

## 2024-02-28 DIAGNOSIS — I10 PRIMARY HYPERTENSION: ICD-10-CM

## 2024-02-28 DIAGNOSIS — E53.8 B12 DEFICIENCY: ICD-10-CM

## 2024-02-28 DIAGNOSIS — Z79.4 TYPE 2 DIABETES MELLITUS WITH HYPERGLYCEMIA, WITH LONG-TERM CURRENT USE OF INSULIN: Primary | ICD-10-CM

## 2024-02-28 DIAGNOSIS — E11.65 TYPE 2 DIABETES MELLITUS WITH HYPERGLYCEMIA, WITH LONG-TERM CURRENT USE OF INSULIN: Primary | ICD-10-CM

## 2024-02-28 LAB
ALBUMIN SERPL-MCNC: 4.1 G/DL (ref 3.5–5.2)
ALBUMIN/GLOB SERPL: 1.2 G/DL
ALP SERPL-CCNC: 101 U/L (ref 39–117)
ALT SERPL W P-5'-P-CCNC: 12 U/L (ref 1–41)
ANION GAP SERPL CALCULATED.3IONS-SCNC: 12.2 MMOL/L (ref 5–15)
AST SERPL-CCNC: 15 U/L (ref 1–40)
BASOPHILS # BLD AUTO: 0.09 10*3/MM3 (ref 0–0.2)
BASOPHILS NFR BLD AUTO: 0.8 % (ref 0–1.5)
BILIRUB SERPL-MCNC: 0.5 MG/DL (ref 0–1.2)
BUN SERPL-MCNC: 16 MG/DL (ref 6–20)
BUN/CREAT SERPL: 13.8 (ref 7–25)
CALCIUM SPEC-SCNC: 9.3 MG/DL (ref 8.6–10.5)
CHLORIDE SERPL-SCNC: 100 MMOL/L (ref 98–107)
CHOLEST SERPL-MCNC: 116 MG/DL (ref 0–200)
CO2 SERPL-SCNC: 23.8 MMOL/L (ref 22–29)
CREAT SERPL-MCNC: 1.16 MG/DL (ref 0.76–1.27)
DEPRECATED RDW RBC AUTO: 39.6 FL (ref 37–54)
EGFRCR SERPLBLD CKD-EPI 2021: 73 ML/MIN/1.73
EOSINOPHIL # BLD AUTO: 0.66 10*3/MM3 (ref 0–0.4)
EOSINOPHIL NFR BLD AUTO: 5.6 % (ref 0.3–6.2)
ERYTHROCYTE [DISTWIDTH] IN BLOOD BY AUTOMATED COUNT: 13.1 % (ref 12.3–15.4)
GLOBULIN UR ELPH-MCNC: 3.3 GM/DL
GLUCOSE SERPL-MCNC: 255 MG/DL (ref 65–99)
HBA1C MFR BLD: 10.6 % (ref 4.8–5.6)
HCT VFR BLD AUTO: 53.8 % (ref 37.5–51)
HDLC SERPL-MCNC: 45 MG/DL (ref 40–60)
HGB BLD-MCNC: 17.9 G/DL (ref 13–17.7)
IMM GRANULOCYTES # BLD AUTO: 0.06 10*3/MM3 (ref 0–0.05)
IMM GRANULOCYTES NFR BLD AUTO: 0.5 % (ref 0–0.5)
LDLC SERPL CALC-MCNC: 44 MG/DL (ref 0–100)
LDLC/HDLC SERPL: 0.88 {RATIO}
LYMPHOCYTES # BLD AUTO: 4.61 10*3/MM3 (ref 0.7–3.1)
LYMPHOCYTES NFR BLD AUTO: 38.9 % (ref 19.6–45.3)
MCH RBC QN AUTO: 27.9 PG (ref 26.6–33)
MCHC RBC AUTO-ENTMCNC: 33.3 G/DL (ref 31.5–35.7)
MCV RBC AUTO: 83.9 FL (ref 79–97)
MONOCYTES # BLD AUTO: 0.95 10*3/MM3 (ref 0.1–0.9)
MONOCYTES NFR BLD AUTO: 8 % (ref 5–12)
NEUTROPHILS NFR BLD AUTO: 46.2 % (ref 42.7–76)
NEUTROPHILS NFR BLD AUTO: 5.47 10*3/MM3 (ref 1.7–7)
NRBC BLD AUTO-RTO: 0 /100 WBC (ref 0–0.2)
PLATELET # BLD AUTO: 386 10*3/MM3 (ref 140–450)
PMV BLD AUTO: 11.2 FL (ref 6–12)
POTASSIUM SERPL-SCNC: 4.9 MMOL/L (ref 3.5–5.2)
PROT SERPL-MCNC: 7.4 G/DL (ref 6–8.5)
RBC # BLD AUTO: 6.41 10*6/MM3 (ref 4.14–5.8)
SODIUM SERPL-SCNC: 136 MMOL/L (ref 136–145)
TRIGL SERPL-MCNC: 158 MG/DL (ref 0–150)
VLDLC SERPL-MCNC: 27 MG/DL (ref 5–40)
WBC NRBC COR # BLD AUTO: 11.84 10*3/MM3 (ref 3.4–10.8)

## 2024-02-28 PROCEDURE — 80053 COMPREHEN METABOLIC PANEL: CPT | Performed by: NURSE PRACTITIONER

## 2024-02-28 PROCEDURE — 80061 LIPID PANEL: CPT | Performed by: NURSE PRACTITIONER

## 2024-02-28 PROCEDURE — 83036 HEMOGLOBIN GLYCOSYLATED A1C: CPT | Performed by: NURSE PRACTITIONER

## 2024-02-28 PROCEDURE — 85025 COMPLETE CBC W/AUTO DIFF WBC: CPT | Performed by: NURSE PRACTITIONER

## 2024-02-28 RX ORDER — INSULIN DEGLUDEC 200 U/ML
60 INJECTION, SOLUTION SUBCUTANEOUS DAILY
Qty: 30 ML | Refills: 0 | Status: SHIPPED | OUTPATIENT
Start: 2024-02-28

## 2024-02-28 NOTE — PROGRESS NOTES
Chief Complaint  Diabetes    History of Present Illness  Zechariah Fleming is a 58 y.o. male who presents to [facility] with a past medical history of    Past Medical History:   Diagnosis Date    Arthritis 6/9/2022    Mixed hyperlipidemia 6/9/2022    Primary hypertension 6/9/2022    Type 2 diabetes mellitus with hyperglycemia, with long-term current use of insulin 6/9/2022     Mr. Lopez presents to the office today for his 3-month follow-up appointment; however, he was last seen in October 2023.    He continues to take B12 for B12 deficiency.    He is taking famotidine 20 mg daily for treatment of chronic heartburn and dysphagia.  It has been recommended that he pursue further evaluation of his dysphagia with UGI versus EGD; however, he has declined evaluation citing that his symptoms are improved with Pepcid.  He has been taking Pepcid for the past year, approximately.    His dyslipidemia is currently treated with pravastatin 80 mg and Zetia 10 mg.  His last lipid profile in October was excellent.  It was the best that it has ever been.  No myalgia with use.    His allergy symptoms are stable with Zyrtec and Flonase.  Denies any sinus complaints today.    He is taking atenolol and lisinopril for treatment of hypertension.  Blood pressure is normotensive on exam, 110/70.  He is without complaints of chest pain, palpitations, headaches, dizziness, or shortness of breath.  No lower extremity edema.    He uses Breo for chronic bronchitis/COPD.  He was finally amenable to CT of the chest for lung cancer screening on 1/30/2024.  CT of the chest showed no suspicious pulmonary nodules, but coronary artery atherosclerosis was noted.    In regards to his diabetes, in October his A1c had decreased from 12% down to 10%.  He is injecting Trulicity 3 mg weekly.  His Tresiba was increased to 60 units daily from 54 units daily.  He continues to take Januvia and Jardiance orally.  He does not check  his blood sugar.  He states he has been out of Tresiba for the past few days.  He reports compliance with medications.  He does not necessarily follow a diabetic diet.  He eats/drinks what ever he wants.  He denies polyuria, polydipsia, or polyphagia.  He has not had a recent eye exam.  He has polyneuropathy symptoms.  He was previously prescribed amitriptyline for this but did not tolerate due to daytime somnolence and excessive fatigue.    He has a history of polycythemia and eosinophilia, as well as leukocytosis, on labs but has declined follow-up hematology evaluation.  He went for an initial consultation and had labs but has lacked follow-up.    Objective   Vital Signs:   Vitals:    02/28/24 0951   BP: 110/70   Pulse: 76   Temp: 98 °F (36.7 °C)   SpO2: 96%   Weight: 88.9 kg (196 lb)     Body mass index is 30.7 kg/m².    Wt Readings from Last 3 Encounters:   02/28/24 88.9 kg (196 lb)   01/03/24 91.6 kg (202 lb)   12/26/23 92.1 kg (203 lb)     BP Readings from Last 3 Encounters:   02/28/24 110/70   01/03/24 110/70   12/26/23 120/78       Health Maintenance   Topic Date Due    Hepatitis B (1 of 3 - 3-dose series) Never done    TDAP/TD VACCINES (1 - Tdap) Never done    DIABETIC EYE EXAM  07/12/2023    DIABETIC FOOT EXAM  12/30/2023    HEMOGLOBIN A1C  04/27/2024    ANNUAL WELLNESS VISIT  07/18/2024    BMI FOLLOWUP  07/18/2024    LIPID PANEL  10/27/2024    URINE MICROALBUMIN  10/27/2024    LUNG CANCER SCREENING  01/29/2025    COLORECTAL CANCER SCREENING  09/11/2025    HEPATITIS C SCREENING  Completed    Pneumococcal Vaccine 0-64  Completed    INFLUENZA VACCINE  Completed    COVID-19 Vaccine  Discontinued    ZOSTER VACCINE  Discontinued       Physical Exam  Vitals reviewed.   Constitutional:       General: He is not in acute distress.     Appearance: He is well-developed. He is obese.   HENT:      Head: Normocephalic and atraumatic.   Eyes:      General: No scleral icterus.     Extraocular Movements: Extraocular  movements intact.      Conjunctiva/sclera: Conjunctivae normal.   Neck:      Vascular: No carotid bruit.      Trachea: Trachea normal.   Cardiovascular:      Rate and Rhythm: Normal rate and regular rhythm.      Pulses: Normal pulses.   Pulmonary:      Effort: Pulmonary effort is normal.      Breath sounds: Normal breath sounds. No wheezing, rhonchi or rales.   Musculoskeletal:         General: Normal range of motion.      Cervical back: Normal range of motion and neck supple. No tenderness.      Right lower leg: No edema.      Left lower leg: No edema.   Lymphadenopathy:      Cervical: No cervical adenopathy.   Skin:     General: Skin is warm and dry.   Neurological:      Mental Status: He is alert and oriented to person, place, and time.   Psychiatric:         Mood and Affect: Mood and affect normal.         Behavior: Behavior normal.         Thought Content: Thought content normal.         Judgment: Judgment normal.           Result Review :  The following data was reviewed by: BIENVENIDO Ross on 02/28/2024:    No visits with results within 1 Month(s) from this visit.   Latest known visit with results is:   Office Visit on 10/27/2023   Component Date Value    WBC 10/27/2023 11.49 (H)     RBC 10/27/2023 5.88 (H)     Hemoglobin 10/27/2023 17.1     Hematocrit 10/27/2023 50.9     MCV 10/27/2023 86.6     MCH 10/27/2023 29.1     MCHC 10/27/2023 33.6     RDW 10/27/2023 13.7     RDW-SD 10/27/2023 43.5     MPV 10/27/2023 10.9     Platelets 10/27/2023 311     Neutrophil % 10/27/2023 41.5 (L)     Lymphocyte % 10/27/2023 41.2     Monocyte % 10/27/2023 7.3     Eosinophil % 10/27/2023 8.8 (H)     Basophil % 10/27/2023 0.9     Immature Grans % 10/27/2023 0.3     Neutrophils, Absolute 10/27/2023 4.78     Lymphocytes, Absolute 10/27/2023 4.73 (H)     Monocytes, Absolute 10/27/2023 0.84     Eosinophils, Absolute 10/27/2023 1.01 (H)     Basophils, Absolute 10/27/2023 0.10     Immature Grans, Absolute 10/27/2023 0.03      nRBC 10/27/2023 0.0     Glucose 10/27/2023 109 (H)     BUN 10/27/2023 12     Creatinine 10/27/2023 0.92     Sodium 10/27/2023 145     Potassium 10/27/2023 4.7     Chloride 10/27/2023 109 (H)     CO2 10/27/2023 28.4     Calcium 10/27/2023 9.4     Total Protein 10/27/2023 7.3     Albumin 10/27/2023 4.4     ALT (SGPT) 10/27/2023 10     AST (SGOT) 10/27/2023 13     Alkaline Phosphatase 10/27/2023 81     Total Bilirubin 10/27/2023 0.4     Globulin 10/27/2023 2.9     A/G Ratio 10/27/2023 1.5     BUN/Creatinine Ratio 10/27/2023 13.0     Anion Gap 10/27/2023 7.6     eGFR 10/27/2023 96.4     Hemoglobin A1C 10/27/2023 10.10 (H)     Total Cholesterol 10/27/2023 112     Triglycerides 10/27/2023 82     HDL Cholesterol 10/27/2023 51     LDL Cholesterol  10/27/2023 45     VLDL Cholesterol 10/27/2023 16     LDL/HDL Ratio 10/27/2023 0.87     TSH 10/27/2023 1.960     Free T4 10/27/2023 0.90 (L)     Microalbumin/Creatinine * 10/27/2023 21.1     Creatinine, Urine 10/27/2023 85.2     Microalbumin, Urine 10/27/2023 1.8     Folate 10/27/2023 7.43     Vitamin B-12 10/27/2023 1,873 (H)       CT Chest Low Dose Cancer Screening WO    Result Date: 1/30/2024     1. No suspicious pulmonary nodule  2. Coronary artery atherosclerosis  LUNG-RADS CLASSIFICATION:  Lung-RADS 1 - Negative.  No nodules or definitely benign nodules.  Recommendation: Continue annual screening with LDCT in 12 months.  Estimated population prevalence is 39%.  Reference: ACR Lung-RADS 2022.        JOSH HOLLINGSWORTH MD       Electronically Signed and Approved By: JOSH HOLLINGSWORTH MD on 1/30/2024 at 9:51             XR Chest PA & Lateral (In Office)    Result Date: 12/26/2023    1. There are suggested findings of granulomatous exposure involving the left hemithorax.     ALAN LA MD       Electronically Signed and Approved By: ALAN LA MD on 12/26/2023 at 14:02             Consult with Fanny Maldonado APRN (07/20/2022)       Procedures        Assessment and Plan    Diagnoses and all orders for this visit:    1. Type 2 diabetes mellitus with hyperglycemia, with long-term current use of insulin (Primary)  -     CBC Auto Differential  -     Comprehensive Metabolic Panel  -     Hemoglobin A1c  -     Lipid Panel  -     Insulin Degludec (Tresiba FlexTouch) 200 UNIT/ML solution pen-injector pen injection; Inject 60 Units under the skin into the appropriate area as directed Daily.  Dispense: 30 mL; Refill: 0    2. Diabetic peripheral neuropathy    3. Primary hypertension  -     CBC Auto Differential  -     Comprehensive Metabolic Panel  -     Lipid Panel    4. Mixed hyperlipidemia  -     Comprehensive Metabolic Panel  -     Lipid Panel    5. Chronic heartburn    6. Dysphagia, unspecified type    7. B12 deficiency                  FOLLOW UP  Return for recheck in 3-6 months pending outcome of A1c.    We will get labs today to repeat hemoglobin A1c.  Patient reminded that his A1c goal is less than 7%.  Adherence to a diabetic diet is recommended for optimal glycemic control.  He is currently on max dose of Trulicity.  He is also on max dose of Januvia and Jardiance.  He is injecting 60 units daily of Tresiba.  I will resend that prescription to the pharmacy as he states they did not receive the prescription that was sent on 2/15/2024.  He will call me if he is not able to obtain this prescription as it may need a prior authorization or there has been a formulary change.  Continue to monitor peripheral neuropathy.    Hypertension is stable.  Dyslipidemia improved on last labs.    He denies any change in heartburn and dysphagia.  Symptoms currently stable.  He does not wish to see a specialist at this time.    He will continue B12 for B12 deficiency.  If labs continue to show polycythemia and eosinophilia then I will also encourage follow-up with hematology.  He has refused up to this point.    Patient was given instructions and counseling regarding his condition or for health  maintenance advice. Please see specific information pulled into the AVS if appropriate.       Brittni Birmingham, APRN  02/28/24  10:31 EST    CURRENT & DISCONTINUED MEDICATIONS  Current Outpatient Medications   Medication Instructions    atenolol (TENORMIN) 25 mg, Oral, Daily    cetirizine (ZYRTEC) 10 mg, Oral, Daily    empagliflozin (JARDIANCE) 25 mg, Oral, Daily    ezetimibe (ZETIA) 10 mg, Oral, Daily    famotidine (PEPCID) 20 MG tablet TAKE 1 TABLET BY MOUTH TWICE DAILY FOR HEARTBURN    fluticasone (FLONASE) 50 MCG/ACT nasal spray 2 sprays, Nasal, Daily    Fluticasone Furoate-Vilanterol (Breo Ellipta) 100-25 MCG/ACT aerosol powder  1 puff, Inhalation, Daily - RT    glucose blood (FREESTYLE LITE) test strip 1 each, Other, Daily, To check fasting    guaiFENesin (MUCINEX) 1,200 mg, Oral, 2 Times Daily    lisinopril (PRINIVIL,ZESTRIL) 5 mg, Oral, Daily    pravastatin (PRAVACHOL) 80 mg, Oral, Daily    SITagliptin (JANUVIA) 100 mg, Oral, Daily    Tresiba FlexTouch 60 Units, Subcutaneous, Daily    Trulicity 3 mg, Subcutaneous, Weekly    vitamin B-12 (CYANOCOBALAMIN) 1,000 mcg, Oral, Daily       Medications Discontinued During This Encounter   Medication Reason    Insulin Degludec (Tresiba FlexTouch) 200 UNIT/ML solution pen-injector pen injection Reorder

## 2024-02-28 NOTE — PROGRESS NOTES
Venipuncture Blood Specimen Collection  Venipuncture performed in left arm by Leandra Beckwith with good hemostasis. Patient tolerated the procedure well without complications.   02/28/24   Leandra Beckwith

## 2024-03-20 DIAGNOSIS — M19.90 ARTHRITIS: ICD-10-CM

## 2024-03-20 RX ORDER — NAPROXEN 500 MG/1
500 TABLET ORAL DAILY
Qty: 90 TABLET | Refills: 0 | OUTPATIENT
Start: 2024-03-20

## 2024-07-17 ENCOUNTER — OFFICE VISIT (OUTPATIENT)
Dept: FAMILY MEDICINE CLINIC | Facility: CLINIC | Age: 59
End: 2024-07-17
Payer: MEDICARE

## 2024-07-17 VITALS
HEART RATE: 104 BPM | BODY MASS INDEX: 29.13 KG/M2 | SYSTOLIC BLOOD PRESSURE: 122 MMHG | OXYGEN SATURATION: 95 % | DIASTOLIC BLOOD PRESSURE: 72 MMHG | WEIGHT: 186 LBS

## 2024-07-17 DIAGNOSIS — R13.10 DYSPHAGIA, UNSPECIFIED TYPE: ICD-10-CM

## 2024-07-17 DIAGNOSIS — E53.8 B12 DEFICIENCY: ICD-10-CM

## 2024-07-17 DIAGNOSIS — J20.9 SUBACUTE BRONCHITIS: ICD-10-CM

## 2024-07-17 DIAGNOSIS — Z00.00 MEDICARE ANNUAL WELLNESS VISIT, SUBSEQUENT: Primary | ICD-10-CM

## 2024-07-17 DIAGNOSIS — E78.2 MIXED HYPERLIPIDEMIA: ICD-10-CM

## 2024-07-17 DIAGNOSIS — J34.89 NASAL CONGESTION WITH RHINORRHEA: ICD-10-CM

## 2024-07-17 DIAGNOSIS — Z71.85 VACCINE COUNSELING: ICD-10-CM

## 2024-07-17 DIAGNOSIS — R09.81 NASAL CONGESTION WITH RHINORRHEA: ICD-10-CM

## 2024-07-17 DIAGNOSIS — I10 PRIMARY HYPERTENSION: ICD-10-CM

## 2024-07-17 DIAGNOSIS — Z12.5 PROSTATE CANCER SCREENING: ICD-10-CM

## 2024-07-17 DIAGNOSIS — R35.0 URINARY FREQUENCY: ICD-10-CM

## 2024-07-17 DIAGNOSIS — D58.2 ELEVATED HEMOGLOBIN: ICD-10-CM

## 2024-07-17 DIAGNOSIS — R12 CHRONIC HEARTBURN: ICD-10-CM

## 2024-07-17 DIAGNOSIS — Z13.31 POSITIVE SCREENING FOR DEPRESSION ON 9-ITEM PATIENT HEALTH QUESTIONNAIRE (PHQ-9): ICD-10-CM

## 2024-07-17 DIAGNOSIS — E11.65 TYPE 2 DIABETES MELLITUS WITH HYPERGLYCEMIA, WITH LONG-TERM CURRENT USE OF INSULIN: ICD-10-CM

## 2024-07-17 DIAGNOSIS — D72.829 LEUKOCYTOSIS, UNSPECIFIED TYPE: ICD-10-CM

## 2024-07-17 DIAGNOSIS — I25.10 CORONARY ARTERY CALCIFICATION SEEN ON CT SCAN: ICD-10-CM

## 2024-07-17 DIAGNOSIS — Z79.4 TYPE 2 DIABETES MELLITUS WITH HYPERGLYCEMIA, WITH LONG-TERM CURRENT USE OF INSULIN: ICD-10-CM

## 2024-07-17 LAB
ALBUMIN UR-MCNC: <1.2 MG/DL
BILIRUB BLD-MCNC: NEGATIVE MG/DL
CLARITY, POC: CLEAR
COLOR UR: YELLOW
CREAT UR-MCNC: 20.4 MG/DL
EXPIRATION DATE: ABNORMAL
GLUCOSE UR STRIP-MCNC: ABNORMAL MG/DL
KETONES UR QL: NEGATIVE
LEUKOCYTE EST, POC: NEGATIVE
Lab: ABNORMAL
MICROALBUMIN/CREAT UR: NORMAL MG/G{CREAT}
NITRITE UR-MCNC: NEGATIVE MG/ML
PH UR: 5 [PH] (ref 5–8)
PROT UR STRIP-MCNC: NEGATIVE MG/DL
RBC # UR STRIP: NEGATIVE /UL
SP GR UR: 1 (ref 1–1.03)
UROBILINOGEN UR QL: ABNORMAL

## 2024-07-17 PROCEDURE — 82043 UR ALBUMIN QUANTITATIVE: CPT | Performed by: NURSE PRACTITIONER

## 2024-07-17 PROCEDURE — 82570 ASSAY OF URINE CREATININE: CPT | Performed by: NURSE PRACTITIONER

## 2024-07-17 RX ORDER — DULAGLUTIDE 3 MG/.5ML
3 INJECTION, SOLUTION SUBCUTANEOUS WEEKLY
Qty: 6 ML | Refills: 1 | Status: SHIPPED | OUTPATIENT
Start: 2024-07-17

## 2024-07-17 RX ORDER — ATENOLOL 25 MG/1
25 TABLET ORAL DAILY
Qty: 90 TABLET | Refills: 1 | Status: SHIPPED | OUTPATIENT
Start: 2024-07-17

## 2024-07-17 RX ORDER — FLUTICASONE PROPIONATE 50 MCG
2 SPRAY, SUSPENSION (ML) NASAL DAILY
Qty: 16 G | Refills: 0 | Status: SHIPPED | OUTPATIENT
Start: 2024-07-17

## 2024-07-17 RX ORDER — FLUTICASONE FUROATE AND VILANTEROL 100; 25 UG/1; UG/1
1 POWDER RESPIRATORY (INHALATION)
Qty: 180 EACH | Refills: 1 | Status: SHIPPED | OUTPATIENT
Start: 2024-07-17

## 2024-07-17 RX ORDER — PRAVASTATIN SODIUM 80 MG/1
80 TABLET ORAL DAILY
Qty: 90 TABLET | Refills: 1 | Status: SHIPPED | OUTPATIENT
Start: 2024-07-17

## 2024-07-17 RX ORDER — LISINOPRIL 5 MG/1
5 TABLET ORAL DAILY
Qty: 90 TABLET | Refills: 1 | Status: SHIPPED | OUTPATIENT
Start: 2024-07-17

## 2024-07-17 RX ORDER — ASPIRIN 81 MG/1
81 TABLET ORAL DAILY
Qty: 90 TABLET | Refills: 3 | Status: SHIPPED | OUTPATIENT
Start: 2024-07-17

## 2024-07-17 RX ORDER — FLUTICASONE PROPIONATE 50 MCG
SPRAY, SUSPENSION (ML) NASAL
Qty: 48 G | OUTPATIENT
Start: 2024-07-17

## 2024-07-17 RX ORDER — EZETIMIBE 10 MG/1
10 TABLET ORAL DAILY
Qty: 90 TABLET | Refills: 1 | Status: SHIPPED | OUTPATIENT
Start: 2024-07-17

## 2024-07-17 RX ORDER — LANOLIN ALCOHOL/MO/W.PET/CERES
1000 CREAM (GRAM) TOPICAL DAILY
Qty: 90 TABLET | Refills: 1 | Status: SHIPPED | OUTPATIENT
Start: 2024-07-17

## 2024-07-17 RX ORDER — CETIRIZINE HYDROCHLORIDE 10 MG/1
10 TABLET ORAL DAILY
Qty: 30 TABLET | Refills: 0 | Status: SHIPPED | OUTPATIENT
Start: 2024-07-17

## 2024-07-17 RX ORDER — FAMOTIDINE 20 MG/1
TABLET, FILM COATED ORAL
Qty: 180 TABLET | Refills: 1 | Status: SHIPPED | OUTPATIENT
Start: 2024-07-17

## 2024-07-17 RX ORDER — INSULIN DEGLUDEC 200 U/ML
60 INJECTION, SOLUTION SUBCUTANEOUS DAILY
Qty: 30 ML | Refills: 0 | Status: SHIPPED | OUTPATIENT
Start: 2024-07-17

## 2024-07-17 NOTE — PROGRESS NOTES
Subjective   The ABCs of the Annual Wellness Visit  Medicare Wellness Visit      Zechariah Fleming is a 58 y.o. patient who presents for a Medicare Wellness Visit.    The following portions of the patient's history were reviewed and updated as appropriate: allergies, current medications, past family history, past medical history, past social history, past surgical history, and problem list.    Compared to one year ago, the patient's physical health is the same.    Compared to one year ago, the patient's mental health is the same.    Recent Hospitalizations:  He was not admitted to the hospital during the last year.     Current Medical Providers:  Patient Care Team:  Brittni Birmingham APRN as PCP - General (Nurse Practitioner)    Outpatient Medications Prior to Visit   Medication Sig Dispense Refill    glucose blood (FREESTYLE LITE) test strip 1 each by Other route Daily. To check fasting 100 each 1    guaiFENesin (Mucinex) 600 MG 12 hr tablet Take 2 tablets by mouth 2 (Two) Times a Day. 40 tablet 0    atenolol (TENORMIN) 25 MG tablet Take 1 tablet by mouth Daily. 90 tablet 1    cetirizine (zyrTEC) 10 MG tablet Take 1 tablet by mouth Daily. 30 tablet 0    Dulaglutide (Trulicity) 3 MG/0.5ML solution pen-injector Inject 0.5 mL under the skin into the appropriate area as directed 1 (One) Time Per Week. 6 mL 0    empagliflozin (Jardiance) 25 MG tablet tablet Take 1 tablet by mouth Daily. 90 tablet 1    ezetimibe (Zetia) 10 MG tablet Take 1 tablet by mouth Daily. 90 tablet 1    famotidine (PEPCID) 20 MG tablet TAKE 1 TABLET BY MOUTH TWICE DAILY FOR HEARTBURN 180 tablet 1    fluticasone (FLONASE) 50 MCG/ACT nasal spray 2 sprays into the nostril(s) as directed by provider Daily. 16 g 0    Fluticasone Furoate-Vilanterol (Breo Ellipta) 100-25 MCG/ACT aerosol powder  Inhale 1 puff Daily. 180 each 1    Insulin Degludec (Tresiba FlexTouch) 200 UNIT/ML solution pen-injector pen injection Inject 60 Units under the skin into the  "appropriate area as directed Daily. 30 mL 0    lisinopril (PRINIVIL,ZESTRIL) 5 MG tablet Take 1 tablet by mouth Daily. 90 tablet 1    pravastatin (PRAVACHOL) 80 MG tablet Take 1 tablet by mouth Daily. 90 tablet 1    SITagliptin (Januvia) 100 MG tablet Take 1 tablet by mouth Daily. 90 tablet 1    vitamin B-12 (CYANOCOBALAMIN) 1000 MCG tablet Take 1 tablet by mouth Daily. 90 tablet 1     No facility-administered medications prior to visit.     No opioid medication identified on active medication list. I have reviewed chart for other potential  high risk medication/s and harmful drug interactions in the elderly.      Aspirin is not on active medication list.  Aspirin use is indicated based on review of current medical condition/s. Pros and cons of this therapy have been discussed with this patient. Benefits of this medication outweigh potential harm.  Patient has been instructed to start taking this medication.    Patient Active Problem List   Diagnosis    Type 2 diabetes mellitus with hyperglycemia, with long-term current use of insulin    Primary hypertension    Mixed hyperlipidemia    Arthritis    Coronary artery calcification seen on CT scan     Advance Care Planning (Click this link to access ACP Navigator)  Advance Directive is not on file.  ACP discussion was held with the patient during this visit. Patient does not have an advance directive, declines further assistance.        Objective   Vitals:    07/17/24 1518   BP: 122/72   Pulse: 104   SpO2: 95%   Weight: 84.4 kg (186 lb)       Estimated body mass index is 29.13 kg/m² as calculated from the following:    Height as of 1/3/24: 170.2 cm (67\").    Weight as of this encounter: 84.4 kg (186 lb).    BMI is >= 25 and <30. (Overweight) The following options were offered after discussion;: information on healthy weight added to patient's after visit summary         Does the patient have evidence of cognitive impairment? No                                             "                                                    Health  Risk Assessment    Smoking Status:  Social History     Tobacco Use   Smoking Status Every Day    Current packs/day: 1.00    Average packs/day: 1 pack/day for 43.5 years (43.5 ttl pk-yrs)    Types: Cigarettes    Start date:    Smokeless Tobacco Never     Alcohol Consumption:  Social History     Substance and Sexual Activity   Alcohol Use Never     Fall Risk Screen:  CHRIS Fall Risk Assessment was completed, and patient is at LOW risk for falls.Assessment completed on:2024    Depression Screenin/17/2024     3:00 PM   PHQ-2/PHQ-9 Depression Screening   Little Interest or Pleasure in Doing Things 0-->not at all   Feeling Down, Depressed or Hopeless 3-->nearly every day   Trouble Falling or Staying Asleep, or Sleeping Too Much 3-->nearly every day   Feeling Tired or Having Little Energy 3-->nearly every day   Poor Appetite or Overeating 3-->nearly every day   Feeling Bad about Yourself - or that You are a Failure or Have Let Yourself or Your Family Down 3-->nearly every day   Trouble Concentrating on Things, Such as Reading the Newspaper or Watching Television 3-->nearly every day   Moving or Speaking So Slowly that Other People Could Have Noticed? Or the Opposite - Being So Fidgety 2-->more than half the days   Thoughts that You Would be Better Off Dead or of Hurting Yourself in Some Way 0-->not at all   PHQ-9: Brief Depression Severity Measure Score 20   If You Checked Off Any Problems, How Difficult Have These Problems Made It For You to Do Your Work, Take Care of Things at Home, or Get Along with Other People? not difficult at all     Health Habits and Functional and Cognitive Screenin/17/2024     3:00 PM   Functional & Cognitive Status   Do you have difficulty preparing food and eating? No   Do you have difficulty bathing yourself, getting dressed or grooming yourself? No   Do you have difficulty using the toilet? No   Do you have  difficulty moving around from place to place? No   Do you have trouble with steps or getting out of a bed or a chair? No   Current Diet Well Balanced Diet   Dental Exam Unknown        Dental Exam Comment 6-7 yrs   Eye Exam Unknown        Eye Exam Comment 1-2yrs   Exercise (times per week) 7 times per week        Exercise Frequency Comment 1-2yrs   Current Exercises Include Walking   Do you need help using the phone?  No   Are you deaf or do you have serious difficulty hearing?  No   Do you need help to go to places out of walking distance? No   Do you need help shopping? No   Do you need help preparing meals?  No   Do you need help with housework?  No   Do you need help with laundry? No   Do you need help taking your medications? No   Do you need help managing money? No   Do you ever drive or ride in a car without wearing a seat belt? Yes   Have you felt unusual stress, anger or loneliness in the last month? Yes   Who do you live with? Spouse   If you need help, do you have trouble finding someone available to you? No   Have you been bothered in the last four weeks by sexual problems? No   Do you have difficulty concentrating, remembering or making decisions? No             Age-appropriate Screening Schedule:  Refer to the list below for future screening recommendations based on patient's age, sex and/or medical conditions. Orders for these recommended tests are listed in the plan section. The patient has been provided with a written plan.    Health Maintenance List  Health Maintenance   Topic Date Due    Hepatitis B (1 of 3 - 19+ 3-dose series) Never done    TDAP/TD VACCINES (1 - Tdap) Never done    DIABETIC EYE EXAM  07/12/2023    DIABETIC FOOT EXAM  12/30/2023    HEMOGLOBIN A1C  08/28/2024    BMI FOLLOWUP  07/18/2024    INFLUENZA VACCINE  08/01/2024    URINE MICROALBUMIN  10/27/2024    LUNG CANCER SCREENING  01/29/2025    LIPID PANEL  02/28/2025    ANNUAL WELLNESS VISIT  07/17/2025    COLORECTAL CANCER SCREENING   09/11/2025    HEPATITIS C SCREENING  Completed    Pneumococcal Vaccine 0-64  Completed    COVID-19 Vaccine  Discontinued    ZOSTER VACCINE  Discontinued                                                                                                                                                CMS Preventative Services Quick Reference  Risk Factors Identified During Encounter    Depression/Dysphoria:  Elevated PHQ-9 score - admits to depression but declines treatment - denies self harm - no HI/SI. He denies any AVH.     Immunizations Discussed/Encouraged: Tdap and Hepatitis B Vaccine/Series    Inactivity/Sedentary: Patient was advised to exercise at least 150 minutes a week per CDC recommendations.    Tobacco Use/Dependance Risk   Zechariah Fleming  reports that he has been smoking cigarettes. He started smoking about 43 years ago. He has a 43.5 pack-year smoking history. He has never used smokeless tobacco. I have educated him on the risk of diseases from using tobacco products such as cancer, COPD, and heart disease. I advised him to quit and he is not willing to quit. I spent 3  minutes counseling the patient.    Dental Screening Recommended  Vision Screening Recommended    The above risks/problems have been discussed with the patient.    Pertinent information has been shared with the patient in the After Visit Summary.    An After Visit Summary and PPPS were made available to the patient.    Follow Up:     Next Medicare Wellness visit to be scheduled in 1 year.         Additional E&M Note during same encounter follows:  Patient has additional, significant, and separately identifiable condition(s)/problem(s) that require work above and beyond the Medicare Wellness Visit     Chief Complaint  Medicare Wellness-subsequent, Med Refill, Hypertension, and Diabetes    Subjective     HPI  DEENA is also being seen today for additional medical problem/s.    He continues to take B12 for B12 deficiency.     He reports that he  is still taking famotidine 20 mg daily for treatment of chronic heartburn and dysphagia.  Dysphagia symptoms have improved since he initially mentions them, but they are still present on occasion.  He continues to decline referral for UGI and/or EGD.  He has lost an additional 10 pounds since he was seen in January.  He does not eat much per his report.  Endorses lack of appetite.  Denies nausea, vomiting, or abdominal pain.  Denies constipation, diarrhea, rectal bleeding, or melena.  He has had colorectal cancer screening with [facility].     His dyslipidemia is currently treated with pravastatin 80 mg and Zetia 10 mg.  His last lipid profile in October was excellent.  It was the best that it has ever been.  No myalgia with use.  He reports that he continues to take both pravastatin and Zetia.  He has not taken baby aspirin for at least 2 years.  He does have evidence for coronary artery atherosclerosis on recent CT of the chest performed in January for lung cancer screening.     His allergy symptoms are stable with Zyrtec and Flonase.  Denies any sinus complaints today.     He is taking atenolol and lisinopril for treatment of hypertension.  Blood pressure is normotensive on exam, 122/72.  He is without complaints of chest pain, palpitations, headaches, dizziness, or shortness of breath.  No lower extremity edema.  He reports being compliant with his blood pressure medication.     He is prescribed Breo for chronic bronchitis/COPD.  He was finally amenable to CT of the chest for lung cancer screening on 1/30/2024.  CT of the chest showed no suspicious pulmonary nodules, but coronary artery atherosclerosis was noted.  He states that he is not taking Breo as he cannot afford it.  In fact, he states there are several medications that he is without currently due to affordability.    He is currently not taking any medications for his diabetes.  He states that he cannot afford Trulicity, Jardiance, Januvia, or Tresiba.   He states that his co-pays are significantly high out-of-pocket.  His last A1c was elevated at 10.6% in February.  He did not return for his 3-month follow-up appointment.  He did not call the office to let me know that he could not get his medications due to out-of-pocket expense.  He is having frequent urination.  He states once he has the urge to void he has to get to the bathroom right away or else he might wet himself.  Sometimes he does dribble on himself.  Denies burning with urination.  No concern for STD. He should be injecting Trulicity once per week, 3 mg, taking Jardiance 25 mg daily, and using Tresiba 60 units nightly.  Basal insulin dosing is upper limits, 0.7 units/kg.  We cannot increase the basal insulin any higher.      He has a history of polycythemia and eosinophilia, as well as leukocytosis, on labs. He went for an initial consultation with hematology/oncology and had labs, but has lacked follow-up.  He was referred back to heme-onc in August, but he states that he never received an appointment.  After looking at the chart, it appears that someone spoke with his wife regarding the appointment.  He states that she never told him    Objective   Vital Signs:  /72   Pulse 104   Wt 84.4 kg (186 lb)   SpO2 95%   BMI 29.13 kg/m²     Physical Exam  Vitals reviewed.   Constitutional:       General: He is not in acute distress.     Appearance: He is well-developed and overweight.   HENT:      Head: Normocephalic and atraumatic.   Eyes:      General: No scleral icterus.        Right eye: No discharge.         Left eye: No discharge.      Extraocular Movements: Extraocular movements intact.      Conjunctiva/sclera: Conjunctivae normal.      Pupils: Pupils are equal, round, and reactive to light.   Neck:      Vascular: No carotid bruit.      Trachea: Trachea normal.   Cardiovascular:      Rate and Rhythm: Normal rate and regular rhythm.      Pulses: Normal pulses.      Heart sounds: No murmur  heard.  Pulmonary:      Effort: Pulmonary effort is normal.      Breath sounds: Normal breath sounds. No wheezing, rhonchi or rales.   Musculoskeletal:         General: Normal range of motion.      Cervical back: Normal range of motion and neck supple. No tenderness.      Right lower leg: No edema.      Left lower leg: No edema.   Lymphadenopathy:      Cervical: No cervical adenopathy.   Skin:     General: Skin is warm and dry.   Neurological:      Mental Status: He is alert and oriented to person, place, and time.   Psychiatric:         Mood and Affect: Mood and affect normal.         Behavior: Behavior normal.         Thought Content: Thought content normal.         Judgment: Judgment normal.         The following data was reviewed by: Brittni Birmingham APRN on 07/17/2024:    Office Visit on 07/17/2024   Component Date Value    Color 07/17/2024 Yellow     Clarity, UA 07/17/2024 Clear     Specific Gravity  07/17/2024 1.005     pH, Urine 07/17/2024 5.0     Leukocytes 07/17/2024 Negative     Nitrite, UA 07/17/2024 Negative     Protein, POC 07/17/2024 Negative     Glucose, UA 07/17/2024 500 mg/dL (A)     Ketones, UA 07/17/2024 Negative     Urobilinogen, UA 07/17/2024 0.2 E.U./dL     Bilirubin 07/17/2024 Negative     Blood, UA 07/17/2024 Negative     Lot Number 07/17/2024 [id number]     Expiration Date 07/17/2024 11,302,025      Common labs          8/9/2023    11:24 10/27/2023    12:13 2/28/2024    10:06   Common Labs   Glucose  109  255    BUN  12  16    Creatinine  0.92  1.16    Sodium  145  136    Potassium  4.7  4.9    Chloride  109  100    Calcium  9.4  9.3    Albumin  4.4  4.1    Total Bilirubin  0.4  0.5    Alkaline Phosphatase  81  101    AST (SGOT)  13  15    ALT (SGPT)  10  12    WBC 9.37  11.49  11.84    Hemoglobin 16.9  17.1  17.9    Hematocrit 50.8  50.9  53.8    Platelets 263  311  386    Total Cholesterol  112  116    Triglycerides  82  158    HDL Cholesterol  51  45    LDL Cholesterol   45  44     Hemoglobin A1C  10.10  10.60    Microalbumin, Urine  1.8       CT Chest Low Dose Cancer Screening WO (01/29/2024 10:21)         Assessment and Plan     Additional age appropriate preventative wellness advice topics were discussed during today's preventative wellness exam(some topics already addressed during AWV portion of the note above):     Healthy Weight: Discussed current and goal BMI with patient. Steps to attain this goal discussed. Information shared in after visit summary.     Motor Vehicle Safety Discussion:  Wearing Seatbelt While in Motor Vehicle recommendation. Adhering to posted speed limit recommendation.      Diagnoses and all orders for this visit:    1. Medicare annual wellness visit, subsequent (Primary)    2. Vaccine counseling  Comments:  Age appropriate: hep b series, TDaP    3. Prostate cancer screening  -     PSA Screen; Future    4. Type 2 diabetes mellitus with hyperglycemia, with long-term current use of insulin  -     Dulaglutide (Trulicity) 3 MG/0.5ML solution pen-injector; Inject 0.5 mL under the skin into the appropriate area as directed 1 (One) Time Per Week.  Dispense: 6 mL; Refill: 1  -     empagliflozin (Jardiance) 25 MG tablet tablet; Take 1 tablet by mouth Daily.  Dispense: 90 tablet; Refill: 1  -     Insulin Degludec (Tresiba FlexTouch) 200 UNIT/ML solution pen-injector pen injection; Inject 60 Units under the skin into the appropriate area as directed Daily.  Dispense: 30 mL; Refill: 0  -     CBC Auto Differential; Future  -     Comprehensive Metabolic Panel; Future  -     Hemoglobin A1c; Future  -     Lipid Panel; Future  -     TSH+Free T4; Future  -     Microalbumin / Creatinine Urine Ratio - Urine, Clean Catch  -     Ambulatory Referral to Chronic Care Management Medication Adherence, Barriers to Care    5. Primary hypertension  -     atenolol (TENORMIN) 25 MG tablet; Take 1 tablet by mouth Daily.  Dispense: 90 tablet; Refill: 1  -     lisinopril (PRINIVIL,ZESTRIL) 5 MG  tablet; Take 1 tablet by mouth Daily.  Dispense: 90 tablet; Refill: 1  -     CBC Auto Differential; Future  -     Comprehensive Metabolic Panel; Future  -     Lipid Panel; Future  -     TSH+Free T4; Future  -     Microalbumin / Creatinine Urine Ratio - Urine, Clean Catch  -     Ambulatory Referral to Chronic Care Management Medication Adherence, Barriers to Care    6. Mixed hyperlipidemia  -     ezetimibe (Zetia) 10 MG tablet; Take 1 tablet by mouth Daily.  Dispense: 90 tablet; Refill: 1  -     pravastatin (PRAVACHOL) 80 MG tablet; Take 1 tablet by mouth Daily.  Dispense: 90 tablet; Refill: 1  -     Comprehensive Metabolic Panel; Future  -     Lipid Panel; Future  -     Ambulatory Referral to Chronic Care Management Medication Adherence, Barriers to Care    7. Coronary artery calcification seen on CT scan  -     aspirin 81 MG EC tablet; Take 1 tablet by mouth Daily.  Dispense: 90 tablet; Refill: 3    8. Chronic heartburn  -     famotidine (PEPCID) 20 MG tablet; TAKE 1 TABLET BY MOUTH TWICE DAILY FOR HEARTBURN  Dispense: 180 tablet; Refill: 1    9. Dysphagia, unspecified type  -     famotidine (PEPCID) 20 MG tablet; TAKE 1 TABLET BY MOUTH TWICE DAILY FOR HEARTBURN  Dispense: 180 tablet; Refill: 1    10. B12 deficiency  -     vitamin B-12 (CYANOCOBALAMIN) 1000 MCG tablet; Take 1 tablet by mouth Daily.  Dispense: 90 tablet; Refill: 1  -     Vitamin B12 & Folate; Future    11. Nasal congestion with rhinorrhea  -     cetirizine (zyrTEC) 10 MG tablet; Take 1 tablet by mouth Daily.  Dispense: 30 tablet; Refill: 0  -     fluticasone (FLONASE) 50 MCG/ACT nasal spray; 2 sprays into the nostril(s) as directed by provider Daily.  Dispense: 16 g; Refill: 0    12. Subacute bronchitis  -     Fluticasone Furoate-Vilanterol (Breo Ellipta) 100-25 MCG/ACT aerosol powder ; Inhale 1 puff Daily.  Dispense: 180 each; Refill: 1    13. Urinary frequency  -     POCT urinalysis dipstick, automated    14. Positive screening for depression on  9-item Patient Health Questionnaire (PHQ-9)    15. Leukocytosis, unspecified type  -     Ambulatory Referral to Hematology / Oncology    16. Elevated hemoglobin  -     Ambulatory Referral to Hematology / Oncology        New Medications Ordered This Visit   Medications    aspirin 81 MG EC tablet     Sig: Take 1 tablet by mouth Daily.     Dispense:  90 tablet     Refill:  3    vitamin B-12 (CYANOCOBALAMIN) 1000 MCG tablet     Sig: Take 1 tablet by mouth Daily.     Dispense:  90 tablet     Refill:  1    famotidine (PEPCID) 20 MG tablet     Sig: TAKE 1 TABLET BY MOUTH TWICE DAILY FOR HEARTBURN     Dispense:  180 tablet     Refill:  1    ezetimibe (Zetia) 10 MG tablet     Sig: Take 1 tablet by mouth Daily.     Dispense:  90 tablet     Refill:  1    pravastatin (PRAVACHOL) 80 MG tablet     Sig: Take 1 tablet by mouth Daily.     Dispense:  90 tablet     Refill:  1    cetirizine (zyrTEC) 10 MG tablet     Sig: Take 1 tablet by mouth Daily.     Dispense:  30 tablet     Refill:  0    fluticasone (FLONASE) 50 MCG/ACT nasal spray     Si sprays into the nostril(s) as directed by provider Daily.     Dispense:  16 g     Refill:  0    atenolol (TENORMIN) 25 MG tablet     Sig: Take 1 tablet by mouth Daily.     Dispense:  90 tablet     Refill:  1    lisinopril (PRINIVIL,ZESTRIL) 5 MG tablet     Sig: Take 1 tablet by mouth Daily.     Dispense:  90 tablet     Refill:  1    Fluticasone Furoate-Vilanterol (Breo Ellipta) 100-25 MCG/ACT aerosol powder      Sig: Inhale 1 puff Daily.     Dispense:  180 each     Refill:  1    Dulaglutide (Trulicity) 3 MG/0.5ML solution pen-injector     Sig: Inject 0.5 mL under the skin into the appropriate area as directed 1 (One) Time Per Week.     Dispense:  6 mL     Refill:  1    empagliflozin (Jardiance) 25 MG tablet tablet     Sig: Take 1 tablet by mouth Daily.     Dispense:  90 tablet     Refill:  1    Insulin Degludec (Tresiba FlexTouch) 200 UNIT/ML solution pen-injector pen injection     Sig:  Inject 60 Units under the skin into the appropriate area as directed Daily.     Dispense:  30 mL     Refill:  0          Follow Up     Return in about 3 months (around 10/17/2024) for Next scheduled follow up.    Urinalysis in the office today with glucose urea, but otherwise normal.  Explained to patient that his urinary urgency and frequency are most likely secondary to poorly controlled diabetes, but we will check PSA as well.  He is due for prostate cancer screening.  He is going to return to the office in the morning for his fasting labs.  We will include hemoglobin A1c, CBC, CMP, lipid, thyroid profile, and urine microalbumin/creatinine ratio.    For the past 4 to 6 weeks he has been without any of his diabetic medication, citing cost as the reason for not taking.  I am going to prescribe all of the medication to his local pharmacy, but also refer to chronic care management urgently to see if they can help us with patient assistance.  He is on disability currently and does not draw and any other and, otherwise.  He states that his monthly out-of-pocket expense for prescriptions exceeds his income.    Initiate aspirin 81 mg daily for coronary artery calcifications as noted on CT.  Continue high-dose statin therapy and Zetia.    We did discuss his elevated PHQ-9 score in the office today.  He does endorse depression but denies any thoughts of hurting himself or others.  He does not wish to take medication for depression.  His depression seemingly stems from his relationship with his spouse.  He will follow-up with me should he change his mind regarding treatment, or should he develop any suicidal ideations or homicidal ideations.    Patient was given instructions and counseling regarding his condition or for health maintenance advice. Please see specific information pulled into the AVS if appropriate.

## 2024-07-18 ENCOUNTER — REFERRAL TRIAGE (OUTPATIENT)
Dept: CASE MANAGEMENT | Facility: OTHER | Age: 59
End: 2024-07-18
Payer: MEDICARE

## 2024-07-18 ENCOUNTER — CLINICAL SUPPORT (OUTPATIENT)
Dept: FAMILY MEDICINE CLINIC | Facility: CLINIC | Age: 59
End: 2024-07-18
Payer: MEDICARE

## 2024-07-18 DIAGNOSIS — I10 PRIMARY HYPERTENSION: ICD-10-CM

## 2024-07-18 DIAGNOSIS — Z12.5 PROSTATE CANCER SCREENING: ICD-10-CM

## 2024-07-18 DIAGNOSIS — E78.2 MIXED HYPERLIPIDEMIA: ICD-10-CM

## 2024-07-18 DIAGNOSIS — E53.8 B12 DEFICIENCY: ICD-10-CM

## 2024-07-18 DIAGNOSIS — E11.65 TYPE 2 DIABETES MELLITUS WITH HYPERGLYCEMIA, WITH LONG-TERM CURRENT USE OF INSULIN: ICD-10-CM

## 2024-07-18 DIAGNOSIS — Z79.4 TYPE 2 DIABETES MELLITUS WITH HYPERGLYCEMIA, WITH LONG-TERM CURRENT USE OF INSULIN: ICD-10-CM

## 2024-07-18 LAB
ALBUMIN SERPL-MCNC: 3.7 G/DL (ref 3.5–5.2)
ALBUMIN/GLOB SERPL: 1 G/DL
ALP SERPL-CCNC: 122 U/L (ref 39–117)
ALT SERPL W P-5'-P-CCNC: 9 U/L (ref 1–41)
ANION GAP SERPL CALCULATED.3IONS-SCNC: 16.1 MMOL/L (ref 5–15)
AST SERPL-CCNC: 13 U/L (ref 1–40)
BASOPHILS # BLD AUTO: 0.1 10*3/MM3 (ref 0–0.2)
BASOPHILS NFR BLD AUTO: 0.8 % (ref 0–1.5)
BILIRUB SERPL-MCNC: 0.3 MG/DL (ref 0–1.2)
BUN SERPL-MCNC: 23 MG/DL (ref 6–20)
BUN/CREAT SERPL: 18.4 (ref 7–25)
CALCIUM SPEC-SCNC: 9.8 MG/DL (ref 8.6–10.5)
CHLORIDE SERPL-SCNC: 95 MMOL/L (ref 98–107)
CHOLEST SERPL-MCNC: 135 MG/DL (ref 0–200)
CO2 SERPL-SCNC: 22.9 MMOL/L (ref 22–29)
CREAT SERPL-MCNC: 1.25 MG/DL (ref 0.76–1.27)
DEPRECATED RDW RBC AUTO: 40.2 FL (ref 37–54)
EGFRCR SERPLBLD CKD-EPI 2021: 66.7 ML/MIN/1.73
EOSINOPHIL # BLD AUTO: 0.75 10*3/MM3 (ref 0–0.4)
EOSINOPHIL NFR BLD AUTO: 5.9 % (ref 0.3–6.2)
ERYTHROCYTE [DISTWIDTH] IN BLOOD BY AUTOMATED COUNT: 13 % (ref 12.3–15.4)
FOLATE SERPL-MCNC: 6.97 NG/ML (ref 4.78–24.2)
GLOBULIN UR ELPH-MCNC: 3.8 GM/DL
GLUCOSE SERPL-MCNC: 339 MG/DL (ref 65–99)
HBA1C MFR BLD: 13.4 % (ref 4.8–5.6)
HCT VFR BLD AUTO: 53.6 % (ref 37.5–51)
HDLC SERPL-MCNC: 35 MG/DL (ref 40–60)
HGB BLD-MCNC: 17.6 G/DL (ref 13–17.7)
IMM GRANULOCYTES # BLD AUTO: 0.06 10*3/MM3 (ref 0–0.05)
IMM GRANULOCYTES NFR BLD AUTO: 0.5 % (ref 0–0.5)
LDLC SERPL CALC-MCNC: 55 MG/DL (ref 0–100)
LDLC/HDLC SERPL: 1.21 {RATIO}
LYMPHOCYTES # BLD AUTO: 4.39 10*3/MM3 (ref 0.7–3.1)
LYMPHOCYTES NFR BLD AUTO: 34.6 % (ref 19.6–45.3)
MCH RBC QN AUTO: 28.3 PG (ref 26.6–33)
MCHC RBC AUTO-ENTMCNC: 32.8 G/DL (ref 31.5–35.7)
MCV RBC AUTO: 86.3 FL (ref 79–97)
MONOCYTES # BLD AUTO: 0.84 10*3/MM3 (ref 0.1–0.9)
MONOCYTES NFR BLD AUTO: 6.6 % (ref 5–12)
NEUTROPHILS NFR BLD AUTO: 51.6 % (ref 42.7–76)
NEUTROPHILS NFR BLD AUTO: 6.55 10*3/MM3 (ref 1.7–7)
NRBC BLD AUTO-RTO: 0 /100 WBC (ref 0–0.2)
PLATELET # BLD AUTO: 372 10*3/MM3 (ref 140–450)
PMV BLD AUTO: 11.3 FL (ref 6–12)
POTASSIUM SERPL-SCNC: 4.6 MMOL/L (ref 3.5–5.2)
PROT SERPL-MCNC: 7.5 G/DL (ref 6–8.5)
PSA SERPL-MCNC: 0.48 NG/ML (ref 0–4)
RBC # BLD AUTO: 6.21 10*6/MM3 (ref 4.14–5.8)
SODIUM SERPL-SCNC: 134 MMOL/L (ref 136–145)
T4 FREE SERPL-MCNC: 1.24 NG/DL (ref 0.92–1.68)
TRIGL SERPL-MCNC: 289 MG/DL (ref 0–150)
TSH SERPL DL<=0.05 MIU/L-ACNC: 4.72 UIU/ML (ref 0.27–4.2)
VIT B12 BLD-MCNC: >2000 PG/ML (ref 211–946)
VLDLC SERPL-MCNC: 45 MG/DL (ref 5–40)
WBC NRBC COR # BLD AUTO: 12.69 10*3/MM3 (ref 3.4–10.8)

## 2024-07-18 PROCEDURE — 80053 COMPREHEN METABOLIC PANEL: CPT | Performed by: NURSE PRACTITIONER

## 2024-07-18 PROCEDURE — 83036 HEMOGLOBIN GLYCOSYLATED A1C: CPT | Performed by: NURSE PRACTITIONER

## 2024-07-18 PROCEDURE — 82746 ASSAY OF FOLIC ACID SERUM: CPT | Performed by: NURSE PRACTITIONER

## 2024-07-18 PROCEDURE — 85025 COMPLETE CBC W/AUTO DIFF WBC: CPT | Performed by: NURSE PRACTITIONER

## 2024-07-18 PROCEDURE — 82607 VITAMIN B-12: CPT | Performed by: NURSE PRACTITIONER

## 2024-07-18 PROCEDURE — 36415 COLL VENOUS BLD VENIPUNCTURE: CPT | Performed by: NURSE PRACTITIONER

## 2024-07-18 PROCEDURE — 84443 ASSAY THYROID STIM HORMONE: CPT | Performed by: NURSE PRACTITIONER

## 2024-07-18 PROCEDURE — 84439 ASSAY OF FREE THYROXINE: CPT | Performed by: NURSE PRACTITIONER

## 2024-07-18 PROCEDURE — 80061 LIPID PANEL: CPT | Performed by: NURSE PRACTITIONER

## 2024-07-18 PROCEDURE — G0103 PSA SCREENING: HCPCS | Performed by: NURSE PRACTITIONER

## 2024-07-18 NOTE — PROGRESS NOTES
Venipuncture Blood Specimen Collection  Venipuncture performed in left arm by Jamila Valadez with good hemostasis. Patient tolerated the procedure well without complications.   07/18/24   Jamila Valadez

## 2024-07-20 DIAGNOSIS — R94.6 ABNORMAL FINDING ON THYROID FUNCTION TEST: Primary | ICD-10-CM

## 2024-07-23 ENCOUNTER — PATIENT OUTREACH (OUTPATIENT)
Dept: CASE MANAGEMENT | Facility: OTHER | Age: 59
End: 2024-07-23
Payer: MEDICARE

## 2024-07-23 NOTE — OUTREACH NOTE
"AMBULATORY CASE MANAGEMENT NOTE    Names and Relationships of Patient/Support Persons: Contact: Zechariah Fleming \"DEENA\"; Relationship: Self -     Patient Outreach    Called and spoke to patient regarding medication affordability. We will plan to attempt enrollment in to patient assistance programs for Tresiba, Jardiance, Trulicity, and Breo-Ellipta as patient is unable to afford these.    He is on disability and draws $1350 per month for this. He is unsure what his wife's income per month is, but he will call me with this information once he has it.    Education Documentation  No documentation found.        ARELY YING  Ambulatory Case Management    7/23/2024, 15:45 EDT  "

## 2024-07-25 ENCOUNTER — PATIENT OUTREACH (OUTPATIENT)
Dept: CASE MANAGEMENT | Facility: OTHER | Age: 59
End: 2024-07-25
Payer: MEDICARE

## 2024-07-25 NOTE — OUTREACH NOTE
AMBULATORY CASE MANAGEMENT NOTE    Names and Relationships of Patient/Support Persons: Contact: [employer]work; Relationship:  -     Care Coordination    Filled out patient assistance forms for Jardiance, Breo-Ellipta, Trulicity and Tresiba.    Will print at office this afternoon.     Education Documentation  No documentation found.        ARELY YING  Ambulatory Case Management    7/25/2024, 13:19 EDT

## 2024-07-26 ENCOUNTER — PATIENT OUTREACH (OUTPATIENT)
Dept: CASE MANAGEMENT | Facility: OTHER | Age: 59
End: 2024-07-26
Payer: MEDICARE

## 2024-07-26 NOTE — OUTREACH NOTE
AMBULATORY CASE MANAGEMENT NOTE    Names and Relationships of Patient/Support Persons:  -     Care Coordination    Printed PAPs for Jardiance, Trulicity, Breo-Ellipta, and Tresiba at office and placed in front office for patients signature.    Patient Outreach    Spoke with patients spouse and informed her that forms are at office and ready for signature. She states that she will let patient know.    She verbalizes that her income is about $840 per month, gross. And the patients is about $1350.    Education Documentation  No documentation found.        ARELY YING  Ambulatory Case Management    7/26/2024, 09:22 EDT

## 2024-08-01 ENCOUNTER — PATIENT OUTREACH (OUTPATIENT)
Dept: CASE MANAGEMENT | Facility: OTHER | Age: 59
End: 2024-08-01
Payer: MEDICARE

## 2024-08-01 ENCOUNTER — TELEPHONE (OUTPATIENT)
Dept: CASE MANAGEMENT | Facility: OTHER | Age: 59
End: 2024-08-01
Payer: MEDICARE

## 2024-08-01 DIAGNOSIS — J20.9 SUBACUTE BRONCHITIS: ICD-10-CM

## 2024-08-01 RX ORDER — FLUTICASONE FUROATE AND VILANTEROL 100; 25 UG/1; UG/1
1 POWDER RESPIRATORY (INHALATION)
Qty: 180 EACH | Refills: 1 | Status: SHIPPED | OUTPATIENT
Start: 2024-08-01

## 2024-08-01 NOTE — OUTREACH NOTE
AMBULATORY CASE MANAGEMENT NOTE    Names and Relationships of Patient/Support Persons: Contact: [employer]work; Relationship:  -     Care Coordination    Received signed PAPs and income forms from patient.    Completed forms and placed on PCP desk for signature.    Education Documentation  No documentation found.        ARELY YING  Ambulatory Case Management    8/1/2024, 13:38 EDT

## 2024-08-01 NOTE — TELEPHONE ENCOUNTER
Evan,  I am completing a patient assistance program for this patients Breo-Ellipta. Could you please print a script for me and place in my box when you have a minute?    Thanks so much!!  DANNIE Matson

## 2024-08-02 ENCOUNTER — PATIENT OUTREACH (OUTPATIENT)
Dept: CASE MANAGEMENT | Facility: OTHER | Age: 59
End: 2024-08-02
Payer: MEDICARE

## 2024-08-02 NOTE — OUTREACH NOTE
AMBULATORY CASE MANAGEMENT NOTE    Names and Relationships of Patient/Support Persons: Contact: [employer]; Relationship: Other  Contact: [employer]; Relationship: Other -     Care Coordination    Faxed PAP applications to [employer] and [employer].    Will need to fill in the refill section on [employer] application & fax. PCP office printed script for [employer] application. Will fax once scanned in.    Education Documentation  No documentation found.        ARELY YING  Ambulatory Case Management    8/2/2024, 15:27 EDT

## 2024-08-06 ENCOUNTER — PATIENT OUTREACH (OUTPATIENT)
Dept: CASE MANAGEMENT | Facility: OTHER | Age: 59
End: 2024-08-06
Payer: MEDICARE

## 2024-08-06 ENCOUNTER — TELEPHONE (OUTPATIENT)
Dept: CASE MANAGEMENT | Facility: OTHER | Age: 59
End: 2024-08-06
Payer: MEDICARE

## 2024-08-06 DIAGNOSIS — Z79.4 TYPE 2 DIABETES MELLITUS WITH HYPERGLYCEMIA, WITH LONG-TERM CURRENT USE OF INSULIN: Primary | ICD-10-CM

## 2024-08-06 DIAGNOSIS — E11.65 TYPE 2 DIABETES MELLITUS WITH HYPERGLYCEMIA, WITH LONG-TERM CURRENT USE OF INSULIN: Primary | ICD-10-CM

## 2024-08-06 RX ORDER — SEMAGLUTIDE 0.68 MG/ML
INJECTION, SOLUTION SUBCUTANEOUS
Start: 2024-08-06 | End: 2024-11-12

## 2024-08-06 NOTE — TELEPHONE ENCOUNTER
Brittni,  I received a letter from [employer] that patients Trulicity application was denied due to medication back orders. They are not accepting new applications for Trulicity at this time.    Is there another injectable you would like to try for the patient? Ozempic and Victoza both have patient assistance programs. Mounjaro does not.     Thanks!  DANNIE Matson

## 2024-08-06 NOTE — OUTREACH NOTE
AMBULATORY CASE MANAGEMENT NOTE    Names and Relationships of Patient/Support Persons: Contact: [employer]work; Relationship: Other -     Care Coordination    Received fax from [employer] that patients application was denied due to back order of Trulicity. They are not accepting new Trulicity applications at this time. Sent telephone note to PCP to see if she would like to change this to a different medication.    Printed [employer] PAP and fixed refills section. Placed in scan pile to be scanned back in and faxed.    Printed [employer] PAP and script. These are in my box. Patient will need to bring out of pocket expense report for himself and wife to see if he will qualify.     Education Documentation  No documentation found.        ARELY YING  Ambulatory Case Management    8/6/2024, 13:07 EDT

## 2024-08-08 ENCOUNTER — PATIENT OUTREACH (OUTPATIENT)
Dept: CASE MANAGEMENT | Facility: OTHER | Age: 59
End: 2024-08-08
Payer: MEDICARE

## 2024-08-08 ENCOUNTER — OFFICE VISIT (OUTPATIENT)
Dept: FAMILY MEDICINE CLINIC | Facility: CLINIC | Age: 59
End: 2024-08-08
Payer: MEDICARE

## 2024-08-08 VITALS
HEART RATE: 104 BPM | BODY MASS INDEX: 28.04 KG/M2 | DIASTOLIC BLOOD PRESSURE: 72 MMHG | SYSTOLIC BLOOD PRESSURE: 136 MMHG | WEIGHT: 179 LBS | TEMPERATURE: 97.4 F | OXYGEN SATURATION: 98 %

## 2024-08-08 DIAGNOSIS — Z79.4 TYPE 2 DIABETES MELLITUS WITH HYPERGLYCEMIA, WITH LONG-TERM CURRENT USE OF INSULIN: ICD-10-CM

## 2024-08-08 DIAGNOSIS — R35.0 URINE FREQUENCY: ICD-10-CM

## 2024-08-08 DIAGNOSIS — E11.65 TYPE 2 DIABETES MELLITUS WITH HYPERGLYCEMIA, WITH LONG-TERM CURRENT USE OF INSULIN: ICD-10-CM

## 2024-08-08 DIAGNOSIS — K13.0 ANGULAR CHEILITIS: Primary | ICD-10-CM

## 2024-08-08 LAB
BILIRUB BLD-MCNC: NEGATIVE MG/DL
CLARITY, POC: CLEAR
COLOR UR: YELLOW
EXPIRATION DATE: ABNORMAL
GLUCOSE UR STRIP-MCNC: ABNORMAL MG/DL
KETONES UR QL: NEGATIVE
LEUKOCYTE EST, POC: NEGATIVE
Lab: ABNORMAL
NITRITE UR-MCNC: NEGATIVE MG/ML
PH UR: 5.5 [PH] (ref 5–8)
PROT UR STRIP-MCNC: NEGATIVE MG/DL
RBC # UR STRIP: NEGATIVE /UL
SP GR UR: 1 (ref 1–1.03)
UROBILINOGEN UR QL: ABNORMAL

## 2024-08-08 PROCEDURE — 3075F SYST BP GE 130 - 139MM HG: CPT | Performed by: NURSE PRACTITIONER

## 2024-08-08 PROCEDURE — 1159F MED LIST DOCD IN RCRD: CPT | Performed by: NURSE PRACTITIONER

## 2024-08-08 PROCEDURE — 1160F RVW MEDS BY RX/DR IN RCRD: CPT | Performed by: NURSE PRACTITIONER

## 2024-08-08 PROCEDURE — 3078F DIAST BP <80 MM HG: CPT | Performed by: NURSE PRACTITIONER

## 2024-08-08 PROCEDURE — 3046F HEMOGLOBIN A1C LEVEL >9.0%: CPT | Performed by: NURSE PRACTITIONER

## 2024-08-08 PROCEDURE — 1126F AMNT PAIN NOTED NONE PRSNT: CPT | Performed by: NURSE PRACTITIONER

## 2024-08-08 PROCEDURE — 99214 OFFICE O/P EST MOD 30 MIN: CPT | Performed by: NURSE PRACTITIONER

## 2024-08-08 PROCEDURE — 81003 URINALYSIS AUTO W/O SCOPE: CPT | Performed by: NURSE PRACTITIONER

## 2024-08-08 RX ORDER — FLUCONAZOLE 100 MG/1
100 TABLET ORAL DAILY
Qty: 7 TABLET | Refills: 0 | Status: SHIPPED | OUTPATIENT
Start: 2024-08-08

## 2024-08-08 NOTE — OUTREACH NOTE
"AMBULATORY CASE MANAGEMENT NOTE    Names and Relationships of Patient/Support Persons: Contact: Zechariah Fleming \"DEENA\"; Relationship: Self -     Care Coordination    Received message from PCP requesting update on PAP status, supplied update.  Called [employer] and [employer]. Both applications have been faxed but not yet processed.       Patient Outreach    Called and spoke with patient advising I will need OOP expense report from pharmacy. Spoke with wife, she wrote this down and will bring in report for patient and herself from pharmacy today.    Patient address is wrong on file. Street address is correct but city and zip were incorrect. This has been corrected in chart. Will update PAP forms and re-fax so companies have correct address to send medications to, should he be accepted in to their programs.    Education Documentation  No documentation found.        ARELY YING  Ambulatory Case Management    8/8/2024, 11:29 EDT  "

## 2024-08-08 NOTE — PROGRESS NOTES
Chief Complaint  Skin Problem (Lips-2months) and Urinary Frequency (1-2months)    History of Present Illness  Zechariah Fleming is a 58 y.o. male who presents to [facility] with a past medical history of  Past Medical History:   Diagnosis Date    Arthritis 6/9/2022    Mixed hyperlipidemia 6/9/2022    Primary hypertension 6/9/2022    Type 2 diabetes mellitus with hyperglycemia, with long-term current use of insulin 6/9/2022     Vipul presents to the office today secondary to concerns for lip irritation.  He states that it has been ongoing for the past 2 months, but has progressively worsened.  He initially thought it was just chapped lips, but treatment for chapped lips was not working.  He then thought maybe it was fever blisters so he has been using Abreva over-the-counter.  That has not been working.  He states his lips are sore and red and now the lower lip is somewhat swollen from irritation.  He does not wear dentures currently as they are broken.  He does endorse frequent drooling, worse at night.  No oral lesions.  No difficulty swallowing or pain with swallowing.    Additionally, he continues to endorse urinary frequency.  He was evaluated for urinary frequency at the time of his last appointment on 7/17/2024.  At that time glucosuria was noted, but otherwise urinalysis was normal.  He continues to endorse frequent urination but denies gross hematuria, burning, or urgency.  A1c returned at 13.4%.  At the time of his appointment he had reported difficulty with getting his medications including Tresiba, Jardiance, and Trulicity.  Was also having difficulty getting Breo.  Chronic care management is currently helping patient receive patient assistance.  Trulicity does not have patient assistance so he is going to be switched to Ozempic.    Objective   Vital Signs:   Vitals:    08/08/24 0957   BP: 136/72   Pulse: 104   Temp: 97.4 °F (36.3 °C)   TempSrc: Temporal   SpO2: 98%    Weight: 81.2 kg (179 lb)     Body mass index is 28.04 kg/m².    Wt Readings from Last 3 Encounters:   08/08/24 81.2 kg (179 lb)   07/17/24 84.4 kg (186 lb)   02/28/24 88.9 kg (196 lb)     BP Readings from Last 3 Encounters:   08/08/24 136/72   07/17/24 122/72   02/28/24 110/70       Health Maintenance   Topic Date Due    Hepatitis B (1 of 3 - 19+ 3-dose series) Never done    TDAP/TD VACCINES (1 - Tdap) Never done    DIABETIC EYE EXAM  07/12/2023    DIABETIC FOOT EXAM  12/30/2023    INFLUENZA VACCINE  08/01/2024    HEMOGLOBIN A1C  01/18/2025    LUNG CANCER SCREENING  01/29/2025    ANNUAL WELLNESS VISIT  07/17/2025    URINE MICROALBUMIN  07/17/2025    LIPID PANEL  07/18/2025    BMI FOLLOWUP  08/06/2025    COLORECTAL CANCER SCREENING  09/11/2025    HEPATITIS C SCREENING  Completed    Pneumococcal Vaccine 0-64  Completed    COVID-19 Vaccine  Discontinued    ZOSTER VACCINE  Discontinued       Physical Exam  Vitals reviewed.   Constitutional:       General: He is not in acute distress.     Appearance: He is well-developed.   HENT:      Head: Normocephalic and atraumatic.      Mouth/Throat:      Lips: Pink. No lesions.      Mouth: Mucous membranes are moist. No injury, lacerations, oral lesions or angioedema.      Dentition: Abnormal dentition. No gingival swelling, dental abscesses or gum lesions.      Tongue: No lesions. Tongue does not deviate from midline.      Palate: No mass and lesions.      Comments: Lips are erythematous without lesions or vesicles.  Appear to be irritated and chapped, and slightly swollen, but does not appear consistent with angioedema.  There is cheilitis present bilaterally.  Eyes:      General: No scleral icterus.        Right eye: No discharge.         Left eye: No discharge.      Extraocular Movements: Extraocular movements intact.      Conjunctiva/sclera: Conjunctivae normal.   Cardiovascular:      Rate and Rhythm: Normal rate and regular rhythm.      Pulses: Normal pulses.      Heart  sounds: No murmur heard.  Pulmonary:      Effort: Pulmonary effort is normal.      Breath sounds: Normal breath sounds. No rhonchi.   Musculoskeletal:         General: Normal range of motion.      Cervical back: Normal range of motion.      Right lower leg: No edema.      Left lower leg: No edema.   Skin:     General: Skin is warm and dry.   Neurological:      Mental Status: He is alert and oriented to person, place, and time.   Psychiatric:         Mood and Affect: Mood and affect normal.         Behavior: Behavior normal.         Thought Content: Thought content normal.         Judgment: Judgment normal.         Result Review :  The following data was reviewed by: BIENVENIDO Ross on 08/08/2024:    Office Visit on 08/08/2024   Component Date Value    Color 08/08/2024 Yellow     Clarity, UA 08/08/2024 Clear     Specific Gravity  08/08/2024 1.005     pH, Urine 08/08/2024 5.5     Leukocytes 08/08/2024 Negative     Nitrite, UA 08/08/2024 Negative     Protein, POC 08/08/2024 Negative     Glucose, UA 08/08/2024 500 mg/dL (A)     Ketones, UA 08/08/2024 Negative     Urobilinogen, UA 08/08/2024 0.2 E.U./dL     Bilirubin 08/08/2024 Negative     Blood, UA 08/08/2024 Negative     Lot Number 08/08/2024 [id number]     Expiration Date 08/08/2024 3,032,026    Clinical Support on 07/18/2024   Component Date Value    WBC 07/18/2024 12.69 (H)     RBC 07/18/2024 6.21 (H)     Hemoglobin 07/18/2024 17.6     Hematocrit 07/18/2024 53.6 (H)     MCV 07/18/2024 86.3     MCH 07/18/2024 28.3     MCHC 07/18/2024 32.8     RDW 07/18/2024 13.0     RDW-SD 07/18/2024 40.2     MPV 07/18/2024 11.3     Platelets 07/18/2024 372     Neutrophil % 07/18/2024 51.6     Lymphocyte % 07/18/2024 34.6     Monocyte % 07/18/2024 6.6     Eosinophil % 07/18/2024 5.9     Basophil % 07/18/2024 0.8     Immature Grans % 07/18/2024 0.5     Neutrophils, Absolute 07/18/2024 6.55     Lymphocytes, Absolute 07/18/2024 4.39 (H)     Monocytes, Absolute 07/18/2024  0.84     Eosinophils, Absolute 07/18/2024 0.75 (H)     Basophils, Absolute 07/18/2024 0.10     Immature Grans, Absolute 07/18/2024 0.06 (H)     nRBC 07/18/2024 0.0     Glucose 07/18/2024 339 (H)     BUN 07/18/2024 23 (H)     Creatinine 07/18/2024 1.25     Sodium 07/18/2024 134 (L)     Potassium 07/18/2024 4.6     Chloride 07/18/2024 95 (L)     CO2 07/18/2024 22.9     Calcium 07/18/2024 9.8     Total Protein 07/18/2024 7.5     Albumin 07/18/2024 3.7     ALT (SGPT) 07/18/2024 9     AST (SGOT) 07/18/2024 13     Alkaline Phosphatase 07/18/2024 122 (H)     Total Bilirubin 07/18/2024 0.3     Globulin 07/18/2024 3.8     A/G Ratio 07/18/2024 1.0     BUN/Creatinine Ratio 07/18/2024 18.4     Anion Gap 07/18/2024 16.1 (H)     eGFR 07/18/2024 66.7     Hemoglobin A1C 07/18/2024 13.40 (H)     Total Cholesterol 07/18/2024 135     Triglycerides 07/18/2024 289 (H)     HDL Cholesterol 07/18/2024 35 (L)     LDL Cholesterol  07/18/2024 55     VLDL Cholesterol 07/18/2024 45 (H)     LDL/HDL Ratio 07/18/2024 1.21     TSH 07/18/2024 4.720 (H)     Free T4 07/18/2024 1.24     PSA 07/18/2024 0.484     Folate 07/18/2024 6.97     Vitamin B-12 07/18/2024 >2,000 (H)    Office Visit on 07/17/2024   Component Date Value    Microalbumin/Creatinine * 07/17/2024      Creatinine, Urine 07/17/2024 20.4     Microalbumin, Urine 07/17/2024 <1.2     Color 07/17/2024 Yellow     Clarity, UA 07/17/2024 Clear     Specific Gravity  07/17/2024 1.005     pH, Urine 07/17/2024 5.0     Leukocytes 07/17/2024 Negative     Nitrite, UA 07/17/2024 Negative     Protein, POC 07/17/2024 Negative     Glucose, UA 07/17/2024 500 mg/dL (A)     Ketones, UA 07/17/2024 Negative     Urobilinogen, UA 07/17/2024 0.2 E.U./dL     Bilirubin 07/17/2024 Negative     Blood, UA 07/17/2024 Negative     Lot Number 07/17/2024 [id number]     Expiration Date 07/17/2024 11302,791      Procedures        Assessment and Plan   Diagnoses and all orders for this visit:    1. Angular cheilitis  (Primary)  -     Clotrimazole 1 % ointment; Apply 1 Application topically 2 (Two) Times a Day.  Dispense: 56.7 g; Refill: 0  -     fluconazole (Diflucan) 100 MG tablet; Take 1 tablet by mouth Daily.  Dispense: 7 tablet; Refill: 0    2. Type 2 diabetes mellitus with hyperglycemia, with long-term current use of insulin    3. Urine frequency  -     POCT urinalysis dipstick, automated                  FOLLOW UP  Return if symptoms worsen or fail to improve in 7-10 days.    I am going to treat him for cheilitis with clotrimazole topically and fluconazole orally.  Advised to follow-up with me in 7 to 10 days if not improving or resolved.  I do not suspect his Breo is contributing as he has been out of that, but symptoms may be related to poorly controlled diabetes.  A1c is over 13%.  Of note, he has lost approximately 20 pounds since the beginning of this year.  I will message chronic care management to see where we are with patient assistance programs for his diabetic regimen, and if receiving medication will be delayed then we need to get him on other basal insulin or other generic oral medications to aid in blood sugar reduction as he is very symptomatic.    Patient was given instructions and counseling regarding his condition or for health maintenance advice. Please see specific information pulled into the AVS if appropriate.       Brittni Birmingham, APRN  08/08/24  10:58 EDT    CURRENT & DISCONTINUED MEDICATIONS  Current Outpatient Medications   Medication Instructions    aspirin 81 mg, Oral, Daily    atenolol (TENORMIN) 25 mg, Oral, Daily    cetirizine (ZYRTEC) 10 mg, Oral, Daily    Clotrimazole 1 % ointment 1 Application, Apply externally, 2 Times Daily    empagliflozin (JARDIANCE) 25 mg, Oral, Daily    ezetimibe (ZETIA) 10 mg, Oral, Daily    famotidine (PEPCID) 20 MG tablet TAKE 1 TABLET BY MOUTH TWICE DAILY FOR HEARTBURN    fluconazole (DIFLUCAN) 100 mg, Oral, Daily    fluticasone (FLONASE) 50 MCG/ACT nasal  spray 2 sprays, Nasal, Daily    Fluticasone Furoate-Vilanterol (Breo Ellipta) 100-25 MCG/ACT aerosol powder  1 puff, Inhalation, Daily - RT    glucose blood (FREESTYLE LITE) test strip 1 each, Other, Daily, To check fasting    guaiFENesin (MUCINEX) 1,200 mg, Oral, 2 Times Daily    lisinopril (PRINIVIL,ZESTRIL) 5 mg, Oral, Daily    pravastatin (PRAVACHOL) 80 mg, Oral, Daily    Semaglutide,0.25 or 0.5MG/DOS, (Ozempic, 0.25 or 0.5 MG/DOSE,) 2 MG/3ML solution pen-injector Inject 0.25 mg under the skin into the appropriate area as directed 1 (One) Time Per Week for 28 days, THEN 0.5 mg 1 (One) Time Per Week for 70 days.    Tresiba FlexTouch 60 Units, Subcutaneous, Daily    vitamin B-12 (CYANOCOBALAMIN) 1,000 mcg, Oral, Daily       There are no discontinued medications.

## 2024-08-08 NOTE — Clinical Note
Do you know where we are regarding patient assistance and his diabetic regimen?  He is very symptomatic and I need to know if I need to try an alternative until we get his medicine through patient assistance.  He is not taking anything currently.

## 2024-08-09 ENCOUNTER — PATIENT OUTREACH (OUTPATIENT)
Dept: CASE MANAGEMENT | Facility: OTHER | Age: 59
End: 2024-08-09
Payer: MEDICARE

## 2024-08-09 NOTE — OUTREACH NOTE
"AMBULATORY CASE MANAGEMENT NOTE    Names and Relationships of Patient/Support Persons: Contact: Zechariah Fleming \"DEENA\"; Relationship: Self -     Care Coordination    Refaxed applications to [employer] and [employer] with corrected home address.    Patient Outreach    Per provider request, I was able to get samples of Ozempic 0.25mg from another [facility]. These were delivered to the office yesterday afternoon. I attempted to call patient to inform that he could pick these up and receive education on how to inject the medication at the office. Left message to call back.     Patient Outreach    Called and spoke with patients wife. She will inform patient that Ozempic samples are at office for .        Education Documentation  No documentation found.        ARELY YING  Ambulatory Case Management    8/9/2024, 11:57 EDT  "

## 2024-08-22 ENCOUNTER — PATIENT OUTREACH (OUTPATIENT)
Dept: CASE MANAGEMENT | Facility: OTHER | Age: 59
End: 2024-08-22
Payer: MEDICARE

## 2024-08-22 ENCOUNTER — OFFICE VISIT (OUTPATIENT)
Dept: FAMILY MEDICINE CLINIC | Facility: CLINIC | Age: 59
End: 2024-08-22
Payer: MEDICARE

## 2024-08-22 VITALS
DIASTOLIC BLOOD PRESSURE: 68 MMHG | BODY MASS INDEX: 28.19 KG/M2 | WEIGHT: 180 LBS | HEART RATE: 100 BPM | OXYGEN SATURATION: 98 % | SYSTOLIC BLOOD PRESSURE: 118 MMHG

## 2024-08-22 DIAGNOSIS — Z79.4 TYPE 2 DIABETES MELLITUS WITH HYPERGLYCEMIA, WITH LONG-TERM CURRENT USE OF INSULIN: ICD-10-CM

## 2024-08-22 DIAGNOSIS — K13.0 ANGULAR CHEILITIS: ICD-10-CM

## 2024-08-22 DIAGNOSIS — E11.65 TYPE 2 DIABETES MELLITUS WITH HYPERGLYCEMIA, WITH LONG-TERM CURRENT USE OF INSULIN: ICD-10-CM

## 2024-08-22 DIAGNOSIS — L98.9 LESION OF SUBCUTANEOUS TISSUE: Primary | ICD-10-CM

## 2024-08-22 PROCEDURE — 3074F SYST BP LT 130 MM HG: CPT | Performed by: NURSE PRACTITIONER

## 2024-08-22 PROCEDURE — 99213 OFFICE O/P EST LOW 20 MIN: CPT | Performed by: NURSE PRACTITIONER

## 2024-08-22 PROCEDURE — 1159F MED LIST DOCD IN RCRD: CPT | Performed by: NURSE PRACTITIONER

## 2024-08-22 PROCEDURE — 1160F RVW MEDS BY RX/DR IN RCRD: CPT | Performed by: NURSE PRACTITIONER

## 2024-08-22 PROCEDURE — 1126F AMNT PAIN NOTED NONE PRSNT: CPT | Performed by: NURSE PRACTITIONER

## 2024-08-22 PROCEDURE — 3078F DIAST BP <80 MM HG: CPT | Performed by: NURSE PRACTITIONER

## 2024-08-22 PROCEDURE — 3046F HEMOGLOBIN A1C LEVEL >9.0%: CPT | Performed by: NURSE PRACTITIONER

## 2024-08-22 RX ORDER — CLOTRIMAZOLE 1 %
CREAM (GRAM) TOPICAL SEE ADMIN INSTRUCTIONS
COMMUNITY
Start: 2024-08-08 | End: 2024-08-22

## 2024-08-22 NOTE — PROGRESS NOTES
"Chief Complaint  Skin Problem (lip) and Diabetes    Skin Problem    Diabetes      Zechariah Fleming is a 59 y.o. male who presents to [facility] with a past medical history of    Past Medical History:   Diagnosis Date    Arthritis 6/9/2022    Mixed hyperlipidemia 6/9/2022    Primary hypertension 6/9/2022    Type 2 diabetes mellitus with hyperglycemia, with long-term current use of insulin 6/9/2022     Vipul presents to the office today to follow-up on cheilitis.  He has completed the oral fluconazole.  He continues to use clotrimazole ointment but states that he is running low on the tube.  He reports significant improvement in his symptoms; however, he can palpate a knot in the upper lip.  He reports redness and swelling are better.  Pain is better.    In regards to his weight, his weight is stable when compared to his last visit on the eighth.  He was 179 pounds and he is 180 pounds today.  In July he was 186 pounds.  He denies any nausea, vomiting, abdominal pain, constipation, diarrhea, rectal bleeding.  He endorses lack of appetite but attributes that to his mood in general.  He and his wife have been going back and forth in regards to their relationship.  At one point they were going to be getting a divorce.  They still live together and he states that they \"still are not right\".  He does not want to see a therapist.  He does not want to take an antidepressant.  He denies homicidal ideations or suicidal ideations.    In regards to his diabetes, he continues to endorse urinary frequency.  He was only able to get Ozempic samples as of yesterday.  He is still awaiting patient assistance for Jardiance and Tresiba, and also Breo.  Chronic care management has submitted his paperwork.  He states he was going to wait to start Ozempic once he got the other medications.    Objective   Vital Signs:   Vitals:    08/22/24 0922   BP: 118/68   Pulse: 100   SpO2: 98%   Weight: 81.6 kg (180 lb) "     Body mass index is 28.19 kg/m².    Wt Readings from Last 3 Encounters:   08/22/24 81.6 kg (180 lb)   08/08/24 81.2 kg (179 lb)   07/17/24 84.4 kg (186 lb)     BP Readings from Last 3 Encounters:   08/22/24 118/68   08/08/24 136/72   07/17/24 122/72       Health Maintenance   Topic Date Due    Hepatitis B (1 of 3 - 19+ 3-dose series) Never done    TDAP/TD VACCINES (1 - Tdap) Never done    DIABETIC EYE EXAM  07/12/2023    DIABETIC FOOT EXAM  12/30/2023    INFLUENZA VACCINE  08/01/2024    HEMOGLOBIN A1C  01/18/2025    LUNG CANCER SCREENING  01/29/2025    ANNUAL WELLNESS VISIT  07/17/2025    URINE MICROALBUMIN  07/17/2025    LIPID PANEL  07/18/2025    BMI FOLLOWUP  08/06/2025    COLORECTAL CANCER SCREENING  09/11/2025    HEPATITIS C SCREENING  Completed    Pneumococcal Vaccine 0-64  Completed    COVID-19 Vaccine  Discontinued    ZOSTER VACCINE  Discontinued       Physical Exam  Vitals reviewed.   Constitutional:       General: He is not in acute distress.     Appearance: He is well-developed and overweight. He is not ill-appearing.   HENT:      Head: Normocephalic and atraumatic.      Mouth/Throat:      Lips: Pink. Lesions (There is a slightly larger than pea-sized subcutaneous nodule palpated in the upper lip, left of midline just above the vermilion border) present.      Mouth: Mucous membranes are moist. No injury or oral lesions.      Dentition: Abnormal dentition. No gum lesions.      Pharynx: Oropharynx is clear. Uvula midline.        Comments: Significant improvement noted of the lips.  Mild erythema present, but no longer swollen.  Cheilitis still present but also appears to be improved from 2 weeks ago.  Eyes:      General: No scleral icterus.        Right eye: No discharge.         Left eye: No discharge.      Extraocular Movements: Extraocular movements intact.      Conjunctiva/sclera: Conjunctivae normal.   Cardiovascular:      Rate and Rhythm: Normal rate and regular rhythm.      Pulses: Normal pulses.       Heart sounds: No murmur heard.  Pulmonary:      Effort: Pulmonary effort is normal.      Breath sounds: Normal breath sounds.   Musculoskeletal:         General: Normal range of motion.      Cervical back: Normal range of motion.   Skin:     General: Skin is warm and dry.   Neurological:      Mental Status: He is alert and oriented to person, place, and time.   Psychiatric:         Mood and Affect: Mood and affect normal.         Behavior: Behavior normal.         Thought Content: Thought content normal.         Judgment: Judgment normal.         Result Review :  The following data was reviewed by: BIENVENIDO Ross on 08/22/2024:    Office Visit on 08/08/2024   Component Date Value    Color 08/08/2024 Yellow     Clarity, UA 08/08/2024 Clear     Specific Gravity  08/08/2024 1.005     pH, Urine 08/08/2024 5.5     Leukocytes 08/08/2024 Negative     Nitrite, UA 08/08/2024 Negative     Protein, POC 08/08/2024 Negative     Glucose, UA 08/08/2024 500 mg/dL (A)     Ketones, UA 08/08/2024 Negative     Urobilinogen, UA 08/08/2024 0.2 E.U./dL     Bilirubin 08/08/2024 Negative     Blood, UA 08/08/2024 Negative     Lot Number 08/08/2024 [id number]     Expiration Date 08/08/2024 3,032,026      Common labs          2/28/2024    10:06 7/17/2024    15:53 7/18/2024    08:32   Common Labs   Glucose 255   339    BUN 16   23    Creatinine 1.16   1.25    Sodium 136   134    Potassium 4.9   4.6    Chloride 100   95    Calcium 9.3   9.8    Albumin 4.1   3.7    Total Bilirubin 0.5   0.3    Alkaline Phosphatase 101   122    AST (SGOT) 15   13    ALT (SGPT) 12   9    WBC 11.84   12.69    Hemoglobin 17.9   17.6    Hematocrit 53.8   53.6    Platelets 386   372    Total Cholesterol 116   135    Triglycerides 158   289    HDL Cholesterol 45   35    LDL Cholesterol  44   55    Hemoglobin A1C 10.60   13.40    Microalbumin, Urine  <1.2     PSA   0.484      Procedures        Assessment and Plan   Diagnoses and all orders for this  visit:    1. Lesion of subcutaneous tissue (Primary)    2. Angular cheilitis  -     Clotrimazole 1 % ointment; Apply 1 Application topically 2 (Two) Times a Day.  Dispense: 56.7 g; Refill: 0    3. Type 2 diabetes mellitus with hyperglycemia, with long-term current use of insulin                  FOLLOW UP  Return in about 2 weeks (around 9/5/2024) for Next scheduled follow up.    We discussed the palpable lesion of the upper lip.  This was not appreciated on my previous exam, but he feels it has been present the entire time.  I recommended further evaluation with ultrasound versus referral to Derm/ENT/general surgery to further assess.  At this time he would like to give it another 2 weeks prior to any additional testing or referrals.  The cheilitis is improving with the completion of oral fluconazole and topical clotrimazole.  I will give him another prescription for the clotrimazole that he can use over the next 1 to 2 weeks.  He will follow-up to reassess at that time and if symptoms are ongoing we will place a referral then.  Voices understanding.    In regards to his diabetes, he did receive his Ozempic yesterday.  He states that he was going to wait until he got his other medications before initiating Ozempic, but I advised him to go ahead and start that due to elevated A1c and also symptomatic with urinary symptoms.  I will continue to work with chronic care management for his patient assistance.  It is my understanding that his paperwork has been approved we are just waiting for him to receive the medications.  This would include his Jardiance, Tresiba, and Breo.    Patient was given instructions and counseling regarding his condition or for health maintenance advice. Please see specific information pulled into the AVS if appropriate.       Brittni Birmingham, APRN  08/22/24  09:54 EDT    CURRENT & DISCONTINUED MEDICATIONS  Current Outpatient Medications   Medication Instructions    aspirin 81 mg, Oral,  Daily    atenolol (TENORMIN) 25 mg, Oral, Daily    Clotrimazole 1 % ointment 1 Application, Apply externally, 2 Times Daily    empagliflozin (JARDIANCE) 25 mg, Oral, Daily    ezetimibe (ZETIA) 10 mg, Oral, Daily    famotidine (PEPCID) 20 MG tablet TAKE 1 TABLET BY MOUTH TWICE DAILY FOR HEARTBURN    Fluticasone Furoate-Vilanterol (Breo Ellipta) 100-25 MCG/ACT aerosol powder  1 puff, Inhalation, Daily - RT    glucose blood (FREESTYLE LITE) test strip 1 each, Other, Daily, To check fasting    lisinopril (PRINIVIL,ZESTRIL) 5 mg, Oral, Daily    pravastatin (PRAVACHOL) 80 mg, Oral, Daily    Semaglutide,0.25 or 0.5MG/DOS, (Ozempic, 0.25 or 0.5 MG/DOSE,) 2 MG/3ML solution pen-injector Inject 0.25 mg under the skin into the appropriate area as directed 1 (One) Time Per Week for 28 days, THEN 0.5 mg 1 (One) Time Per Week for 70 days.    Tresiba FlexTouch 60 Units, Subcutaneous, Daily    vitamin B-12 (CYANOCOBALAMIN) 1,000 mcg, Oral, Daily       Medications Discontinued During This Encounter   Medication Reason    guaiFENesin (Mucinex) 600 MG 12 hr tablet *Therapy completed    cetirizine (zyrTEC) 10 MG tablet *Therapy completed    fluticasone (FLONASE) 50 MCG/ACT nasal spray *Therapy completed    clotrimazole (LOTRIMIN) 1 % cream *Therapy completed    fluconazole (Diflucan) 100 MG tablet *Therapy completed    Clotrimazole 1 % ointment Reorder

## 2024-08-22 NOTE — OUTREACH NOTE
AMBULATORY CASE MANAGEMENT NOTE    Names and Relationships of Patient/Support Persons: Contact: Paperwork; Relationship:  -     Care Coordination    Received message from [employer] stating that patient application could not be processed due to illegible patient phone number. Re-printed and wrote in patient phone number again. Placed in scan pile to be scanned and refaxed.    Called and spoke to [facility] for status update. Unfortunately, they state that they still cannot locate patients application. I explained that I have faxed the application twice now and received confirmations both times on my fax. They supplied me with an expedited fax of [phone].     I have refaxed [employer] application to the expedited number listed above.    Education Documentation  No documentation found.        ARELY YING  Ambulatory Case Management    8/22/2024, 13:47 EDT

## 2024-08-23 ENCOUNTER — PATIENT OUTREACH (OUTPATIENT)
Dept: CASE MANAGEMENT | Facility: OTHER | Age: 59
End: 2024-08-23
Payer: MEDICARE

## 2024-08-23 NOTE — OUTREACH NOTE
AMBULATORY CASE MANAGEMENT NOTE    Names and Relationships of Patient/Support Persons: Contact: [employer]; Relationship: Other -     Care Coordination    Faxed [employer] application page with phone numbers written in so hopefully company can read the fax this time.    Education Documentation  No documentation found.        ARELY YING  Ambulatory Case Management    8/23/2024, 11:42 EDT

## 2024-08-26 ENCOUNTER — PATIENT OUTREACH (OUTPATIENT)
Dept: CASE MANAGEMENT | Facility: OTHER | Age: 59
End: 2024-08-26
Payer: MEDICARE

## 2024-08-26 NOTE — OUTREACH NOTE
AMBULATORY CASE MANAGEMENT NOTE    Names and Relationships of Patient/Support Persons: Contact: Milton; Relationship: Other  Contact: [facility]; Relationship: Other -     Care Coordination    Called and spoke with [place] to check status of Ozempic and Tresiba and to ensure they received phone numbers as requested. Rep states that they did receive the fax with the legible phone numbers, however, they are still needing prescription information. They state that forms that were faxed were left blank in the prescription section. I am unsure how they have blank prescription sections, as the prescription sections are filled out in the forms that are scanned in to patients chart, however, I have refaxed the prescription pages.    Called  [employer] to check status of Jardiance application. They state that application was received and approved on 8/12/2024 and the medication was shipped and delivered to patients home on 8/15/24. Verified correct patient again with rep as I was told on 8/22 that application had not been received. We confirmed that this is the correct patient.    Patient Outreach    Attempted to call patient to confirm that he received Jardiance, and to update on NovoNordisk.    Also need patient to bring OOP expense for himself and spouse if he wants to proceed with Breo Ellipta application.     Education Documentation  No documentation found.        ARELY YING  Ambulatory Case Management    8/26/2024, 10:36 EDT

## 2024-08-27 ENCOUNTER — PATIENT OUTREACH (OUTPATIENT)
Dept: CASE MANAGEMENT | Facility: OTHER | Age: 59
End: 2024-08-27
Payer: MEDICARE

## 2024-08-27 NOTE — OUTREACH NOTE
"AMBULATORY CASE MANAGEMENT NOTE    Names and Relationships of Patient/Support Persons: Contact: Milton; Relationship: Other  Contact: Zechariah Fleming \"DEENA\"; Relationship: Self -     Care Coordination    Called [employer] regarding Tresiba and Ozempic. Patients application was approved through 12/31/2024.    Patient Outreach    Called patient and advised of above. Advised that medication would be shipped to PCP office and should arrive within 10-14 business days.     Reminded patient that I will need OOP expense on himself and wife to move forward with [employer] [employer]. He states that he went to the pharmacy to get this but they did not know what he was talking about so they did not give him any forms. Advised that I would call to see if they could print it for patient. He advises that he could pick this up today if they can get it ready for him.    I inquired if patient has received Jardiance yet. He says he has not. KATHERINE oliveira states that it was delivered to his home address on 8/15/24. He states that he has not seen it but he will look around for it and call me if he does not find it.     Care Coordination    Sent PCP and MA update on above.    Called konstantin x 3 but I have received a busy signal each time.    Care Coordination    Attempted to call Konstantin again at 2 pm x 2. Still receiving busy signal.    Care Coordination    Called and spoke with patients pharmacy. They will get OOP expense report ready for patient to . They state that wife will have to be present for her to get hers as well. Patient cannot  his wife's copy.    Patient Outreach    Called and spoke to patients wife. She verbalizes understanding of above and will let patient know.    Education Documentation  No documentation found.        ARELY YING  Ambulatory Case Management    8/27/2024, 11:50 EDT  "

## 2024-09-06 ENCOUNTER — OFFICE VISIT (OUTPATIENT)
Dept: FAMILY MEDICINE CLINIC | Facility: CLINIC | Age: 59
End: 2024-09-06
Payer: MEDICARE

## 2024-09-06 VITALS
OXYGEN SATURATION: 95 % | SYSTOLIC BLOOD PRESSURE: 114 MMHG | HEIGHT: 67 IN | DIASTOLIC BLOOD PRESSURE: 60 MMHG | HEART RATE: 102 BPM | WEIGHT: 178 LBS | BODY MASS INDEX: 27.94 KG/M2

## 2024-09-06 DIAGNOSIS — E11.65 TYPE 2 DIABETES MELLITUS WITH HYPERGLYCEMIA, WITH LONG-TERM CURRENT USE OF INSULIN: Primary | ICD-10-CM

## 2024-09-06 DIAGNOSIS — L98.9 LESION OF SUBCUTANEOUS TISSUE: ICD-10-CM

## 2024-09-06 DIAGNOSIS — Z79.4 TYPE 2 DIABETES MELLITUS WITH HYPERGLYCEMIA, WITH LONG-TERM CURRENT USE OF INSULIN: Primary | ICD-10-CM

## 2024-09-06 DIAGNOSIS — K13.0 ANGULAR CHEILITIS: ICD-10-CM

## 2024-09-06 PROCEDURE — 1160F RVW MEDS BY RX/DR IN RCRD: CPT | Performed by: NURSE PRACTITIONER

## 2024-09-06 PROCEDURE — 3078F DIAST BP <80 MM HG: CPT | Performed by: NURSE PRACTITIONER

## 2024-09-06 PROCEDURE — 99214 OFFICE O/P EST MOD 30 MIN: CPT | Performed by: NURSE PRACTITIONER

## 2024-09-06 PROCEDURE — 3046F HEMOGLOBIN A1C LEVEL >9.0%: CPT | Performed by: NURSE PRACTITIONER

## 2024-09-06 PROCEDURE — 1126F AMNT PAIN NOTED NONE PRSNT: CPT | Performed by: NURSE PRACTITIONER

## 2024-09-06 PROCEDURE — 3074F SYST BP LT 130 MM HG: CPT | Performed by: NURSE PRACTITIONER

## 2024-09-06 PROCEDURE — 1159F MED LIST DOCD IN RCRD: CPT | Performed by: NURSE PRACTITIONER

## 2024-09-06 NOTE — PROGRESS NOTES
"Chief Complaint  Diabetes (Follow up) and Skin Problem (Lip is doing better still dry)    Diabetes    Skin Problem      Zechariah Fleming is a 59 y.o. male who presents to [facility] with a past medical history of    Past Medical History:   Diagnosis Date    Arthritis 6/9/2022    Mixed hyperlipidemia 6/9/2022    Primary hypertension 6/9/2022    Type 2 diabetes mellitus with hyperglycemia, with long-term current use of insulin 6/9/2022     He just got his Ozempic today - he does not know what his blood sugar reading are - he does not check them. He is still waiting on some of his medication to come in. Reportedly, his Jardiance was shipped out in mid-August and delivered to his house, but he told CCM that he did not get it. He has been approved for Tresiba and that should be arriving soon.     His lips are much better with continued use of clotrimazole - he feels that the knot is resolving. His lips are dry but he has not been using Chapstick or anything other than the clotrimazole. He has used 1 full tube, and part of a second.     Objective   Vital Signs:   Vitals:    09/06/24 1302   BP: 114/60   Pulse: 102   SpO2: 95%   Weight: 80.7 kg (178 lb)   Height: 170.2 cm (67\")     Body mass index is 27.88 kg/m².    Wt Readings from Last 3 Encounters:   09/06/24 80.7 kg (178 lb)   08/22/24 81.6 kg (180 lb)   08/08/24 81.2 kg (179 lb)     BP Readings from Last 3 Encounters:   09/06/24 114/60   08/22/24 118/68   08/08/24 136/72       Health Maintenance   Topic Date Due    Hepatitis B (1 of 3 - 19+ 3-dose series) Never done    TDAP/TD VACCINES (1 - Tdap) Never done    DIABETIC EYE EXAM  07/12/2023    DIABETIC FOOT EXAM  12/30/2023    INFLUENZA VACCINE  08/01/2024    HEMOGLOBIN A1C  01/18/2025    LUNG CANCER SCREENING  01/29/2025    ANNUAL WELLNESS VISIT  07/17/2025    URINE MICROALBUMIN  07/17/2025    LIPID PANEL  07/18/2025    BMI FOLLOWUP  08/06/2025    COLORECTAL CANCER SCREENING  09/11/2025 "    HEPATITIS C SCREENING  Completed    Pneumococcal Vaccine 0-64  Completed    COVID-19 Vaccine  Discontinued    ZOSTER VACCINE  Discontinued       Physical Exam  Vitals reviewed.   Constitutional:       General: He is not in acute distress.     Appearance: He is well-developed and overweight. He is not ill-appearing.   HENT:      Head: Normocephalic and atraumatic.      Mouth/Throat:      Lips: Pink. Lesions: Lips are pink, but dry.      Dentition: Abnormal dentition.        Comments: Subcutaneous nodule previously palpated is still palpated today; however, this does appear to be smaller than previous.  Nodule is left of midline just above the vermilion border.  Eyes:      General: No scleral icterus.        Right eye: No discharge.         Left eye: No discharge.      Extraocular Movements: Extraocular movements intact.      Conjunctiva/sclera: Conjunctivae normal.   Cardiovascular:      Rate and Rhythm: Normal rate and regular rhythm.   Pulmonary:      Effort: Pulmonary effort is normal.      Breath sounds: Normal breath sounds.   Musculoskeletal:         General: Normal range of motion.      Cervical back: Normal range of motion.   Skin:     General: Skin is warm and dry.   Neurological:      Mental Status: He is alert and oriented to person, place, and time.   Psychiatric:         Mood and Affect: Mood and affect normal.         Behavior: Behavior normal.         Thought Content: Thought content normal.         Judgment: Judgment normal.         Result Review :  The following data was reviewed by: BIENVENIDO Ross on 09/06/2024:    Office Visit on 08/08/2024   Component Date Value    Color 08/08/2024 Yellow     Clarity, UA 08/08/2024 Clear     Specific Gravity  08/08/2024 1.005     pH, Urine 08/08/2024 5.5     Leukocytes 08/08/2024 Negative     Nitrite, UA 08/08/2024 Negative     Protein, POC 08/08/2024 Negative     Glucose, UA 08/08/2024 500 mg/dL (A)     Ketones, UA 08/08/2024 Negative      Urobilinogen, UA 08/08/2024 0.2 E.U./dL     Bilirubin 08/08/2024 Negative     Blood, UA 08/08/2024 Negative     Lot Number 08/08/2024 [id number]     Expiration Date 08/08/2024 3,032,533      Common labs          2/28/2024    10:06 7/17/2024    15:53 7/18/2024    08:32   Common Labs   Glucose 255   339    BUN 16   23    Creatinine 1.16   1.25    Sodium 136   134    Potassium 4.9   4.6    Chloride 100   95    Calcium 9.3   9.8    Albumin 4.1   3.7    Total Bilirubin 0.5   0.3    Alkaline Phosphatase 101   122    AST (SGOT) 15   13    ALT (SGPT) 12   9    WBC 11.84   12.69    Hemoglobin 17.9   17.6    Hematocrit 53.8   53.6    Platelets 386   372    Total Cholesterol 116   135    Triglycerides 158   289    HDL Cholesterol 45   35    LDL Cholesterol  44   55    Hemoglobin A1C 10.60   13.40    Microalbumin, Urine  <1.2     PSA   0.484      Procedures        Assessment and Plan   Diagnoses and all orders for this visit:    1. Type 2 diabetes mellitus with hyperglycemia, with long-term current use of insulin (Primary)  Comments:  Start Ozempic today - 0.25mg weekly x4 weeks, then 0.5mg weekly x4 weeks. Follow up in 6 weeks with BG log - check at least 3 x weekly. Currently not checking.    2. Lesion of subcutaneous tissue  Comments:  Improving - patient wants to defer referral to ENT until reassessment at 6 week follow up.    3. Angular cheilitis  Comments:  Complete course of clotrimazole ointment, then transistion to lip balm for dryness. Reassess for recurrence at 6 week follow up.        FOLLOW UP  Return in about 6 weeks (around 10/18/2024).    He is going to see me back in 6 weeks.  At that time he will be due for his every 3 month A1c.  He is going to start Ozempic today.  He should be able to start Tresiba within the next 1 week.  That is coming from patient assistance.  It is still unclear whether or not he has received Jardiance as there was apparently a mixup with mail/delivery.  I will look into this further  with chronic care management.    He does not want to see ENT at this time secondary to the lesion of the upper lip.  He feels that it is improving in size and it does appear smaller today.  If that is still present at his follow-up we will go ahead and refer at that time.    For the angular cheilitis is also improving with continued use of clotrimazole.  He is going to complete his second tube of clotrimazole ointment and then discontinue and transition to a lip balm.  We will reassess at his 6-week follow-up for recurrence.  If recurrent then ENT is recommended for this as well.    Patient was given instructions and counseling regarding his condition or for health maintenance advice. Please see specific information pulled into the AVS if appropriate.       Brittni Birmingham, APRN  09/06/24  13:51 EDT    CURRENT & DISCONTINUED MEDICATIONS  Current Outpatient Medications   Medication Instructions    aspirin 81 mg, Oral, Daily    atenolol (TENORMIN) 25 mg, Oral, Daily    Clotrimazole 1 % ointment 1 Application, Apply externally, 2 Times Daily    empagliflozin (JARDIANCE) 25 mg, Oral, Daily    ezetimibe (ZETIA) 10 mg, Oral, Daily    famotidine (PEPCID) 20 MG tablet TAKE 1 TABLET BY MOUTH TWICE DAILY FOR HEARTBURN    Fluticasone Furoate-Vilanterol (Breo Ellipta) 100-25 MCG/ACT aerosol powder  1 puff, Inhalation, Daily - RT    glucose blood (FREESTYLE LITE) test strip 1 each, Other, Daily, To check fasting    lisinopril (PRINIVIL,ZESTRIL) 5 mg, Oral, Daily    pravastatin (PRAVACHOL) 80 mg, Oral, Daily    Semaglutide,0.25 or 0.5MG/DOS, (Ozempic, 0.25 or 0.5 MG/DOSE,) 2 MG/3ML solution pen-injector Inject 0.25 mg under the skin into the appropriate area as directed 1 (One) Time Per Week for 28 days, THEN 0.5 mg 1 (One) Time Per Week for 70 days.    Tresiba FlexTouch 60 Units, Subcutaneous, Daily    vitamin B-12 (CYANOCOBALAMIN) 1,000 mcg, Oral, Daily       There are no discontinued medications.

## 2024-09-21 DIAGNOSIS — E78.2 MIXED HYPERLIPIDEMIA: ICD-10-CM

## 2024-09-21 DIAGNOSIS — I10 PRIMARY HYPERTENSION: ICD-10-CM

## 2024-10-18 ENCOUNTER — OFFICE VISIT (OUTPATIENT)
Dept: FAMILY MEDICINE CLINIC | Facility: CLINIC | Age: 59
End: 2024-10-18
Payer: MEDICARE

## 2024-10-18 VITALS
HEIGHT: 67 IN | WEIGHT: 171 LBS | OXYGEN SATURATION: 98 % | TEMPERATURE: 98.1 F | SYSTOLIC BLOOD PRESSURE: 126 MMHG | HEART RATE: 90 BPM | DIASTOLIC BLOOD PRESSURE: 74 MMHG | BODY MASS INDEX: 26.84 KG/M2

## 2024-10-18 DIAGNOSIS — Z79.4 TYPE 2 DIABETES MELLITUS WITH HYPERGLYCEMIA, WITH LONG-TERM CURRENT USE OF INSULIN: Primary | ICD-10-CM

## 2024-10-18 DIAGNOSIS — Z23 NEED FOR TDAP VACCINATION: ICD-10-CM

## 2024-10-18 DIAGNOSIS — Z23 NEED FOR INFLUENZA VACCINATION: ICD-10-CM

## 2024-10-18 DIAGNOSIS — E78.2 MIXED HYPERLIPIDEMIA: ICD-10-CM

## 2024-10-18 DIAGNOSIS — I10 PRIMARY HYPERTENSION: ICD-10-CM

## 2024-10-18 DIAGNOSIS — Z12.11 COLON CANCER SCREENING: ICD-10-CM

## 2024-10-18 DIAGNOSIS — E11.9 ENCOUNTER FOR DIABETIC FOOT EXAM: ICD-10-CM

## 2024-10-18 DIAGNOSIS — E11.65 TYPE 2 DIABETES MELLITUS WITH HYPERGLYCEMIA, WITH LONG-TERM CURRENT USE OF INSULIN: Primary | ICD-10-CM

## 2024-10-18 DIAGNOSIS — R94.6 ABNORMAL FINDING ON THYROID FUNCTION TEST: ICD-10-CM

## 2024-10-18 LAB
ALBUMIN SERPL-MCNC: 4 G/DL (ref 3.5–5.2)
ALBUMIN UR-MCNC: 11.8 MG/DL
ALBUMIN/GLOB SERPL: 1 G/DL
ALP SERPL-CCNC: 101 U/L (ref 39–117)
ALT SERPL W P-5'-P-CCNC: 11 U/L (ref 1–41)
ANION GAP SERPL CALCULATED.3IONS-SCNC: 14.6 MMOL/L (ref 5–15)
AST SERPL-CCNC: 14 U/L (ref 1–40)
BILIRUB SERPL-MCNC: 0.3 MG/DL (ref 0–1.2)
BUN SERPL-MCNC: 15 MG/DL (ref 6–20)
BUN/CREAT SERPL: 18.1 (ref 7–25)
CALCIUM SPEC-SCNC: 9.8 MG/DL (ref 8.6–10.5)
CHLORIDE SERPL-SCNC: 101 MMOL/L (ref 98–107)
CHOLEST SERPL-MCNC: 104 MG/DL (ref 0–200)
CO2 SERPL-SCNC: 26.4 MMOL/L (ref 22–29)
CREAT SERPL-MCNC: 0.83 MG/DL (ref 0.76–1.27)
CREAT UR-MCNC: 156.2 MG/DL
EGFRCR SERPLBLD CKD-EPI 2021: 100.8 ML/MIN/1.73
GLOBULIN UR ELPH-MCNC: 3.9 GM/DL
GLUCOSE SERPL-MCNC: 65 MG/DL (ref 65–99)
HBA1C MFR BLD: 11.6 % (ref 4.8–5.6)
HDLC SERPL-MCNC: 50 MG/DL (ref 40–60)
LDLC SERPL CALC-MCNC: 37 MG/DL (ref 0–100)
LDLC/HDLC SERPL: 0.74 {RATIO}
MICROALBUMIN/CREAT UR: 75.5 MG/G (ref 0–29)
POTASSIUM SERPL-SCNC: 4.3 MMOL/L (ref 3.5–5.2)
PROT SERPL-MCNC: 7.9 G/DL (ref 6–8.5)
SODIUM SERPL-SCNC: 142 MMOL/L (ref 136–145)
TRIGL SERPL-MCNC: 84 MG/DL (ref 0–150)
TSH SERPL DL<=0.05 MIU/L-ACNC: 2.21 UIU/ML (ref 0.27–4.2)
VLDLC SERPL-MCNC: 17 MG/DL (ref 5–40)

## 2024-10-18 PROCEDURE — 1160F RVW MEDS BY RX/DR IN RCRD: CPT | Performed by: NURSE PRACTITIONER

## 2024-10-18 PROCEDURE — 83036 HEMOGLOBIN GLYCOSYLATED A1C: CPT | Performed by: NURSE PRACTITIONER

## 2024-10-18 PROCEDURE — 3074F SYST BP LT 130 MM HG: CPT | Performed by: NURSE PRACTITIONER

## 2024-10-18 PROCEDURE — 80053 COMPREHEN METABOLIC PANEL: CPT | Performed by: NURSE PRACTITIONER

## 2024-10-18 PROCEDURE — 3046F HEMOGLOBIN A1C LEVEL >9.0%: CPT | Performed by: NURSE PRACTITIONER

## 2024-10-18 PROCEDURE — 82570 ASSAY OF URINE CREATININE: CPT | Performed by: NURSE PRACTITIONER

## 2024-10-18 PROCEDURE — 1159F MED LIST DOCD IN RCRD: CPT | Performed by: NURSE PRACTITIONER

## 2024-10-18 PROCEDURE — 3078F DIAST BP <80 MM HG: CPT | Performed by: NURSE PRACTITIONER

## 2024-10-18 PROCEDURE — G0008 ADMIN INFLUENZA VIRUS VAC: HCPCS | Performed by: NURSE PRACTITIONER

## 2024-10-18 PROCEDURE — 82043 UR ALBUMIN QUANTITATIVE: CPT | Performed by: NURSE PRACTITIONER

## 2024-10-18 PROCEDURE — 1126F AMNT PAIN NOTED NONE PRSNT: CPT | Performed by: NURSE PRACTITIONER

## 2024-10-18 PROCEDURE — 80061 LIPID PANEL: CPT | Performed by: NURSE PRACTITIONER

## 2024-10-18 PROCEDURE — 84443 ASSAY THYROID STIM HORMONE: CPT | Performed by: NURSE PRACTITIONER

## 2024-10-18 PROCEDURE — 90656 IIV3 VACC NO PRSV 0.5 ML IM: CPT | Performed by: NURSE PRACTITIONER

## 2024-10-18 PROCEDURE — 99214 OFFICE O/P EST MOD 30 MIN: CPT | Performed by: NURSE PRACTITIONER

## 2024-10-18 NOTE — PROGRESS NOTES
..  Venipuncture Blood Specimen Collection  Venipuncture performed in LT arm by Stefany Fisher MA with good hemostasis. Patient tolerated the procedure well without complications.   10/18/24   Stefany Fisher MA

## 2024-10-18 NOTE — PROGRESS NOTES
"Chief Complaint  Diabetes (6 wk follow up)    History of Present Illness  Zechariah Fleming is a 59 y.o. male who presents to [facility] with a past medical history of    Past Medical History:   Diagnosis Date    Arthritis 6/9/2022    Mixed hyperlipidemia 6/9/2022    Primary hypertension 6/9/2022    Type 2 diabetes mellitus with hyperglycemia, with long-term current use of insulin 6/9/2022     Vipul presents to the office today for his diabetes follow up -at the time of his last labs in July his A1c was up to 13.4% from 10.6% in February.  In October 2023 it was 10.10%, and then in July 2023 it was 12.5%. His lowest A1c over the past 2 years was 8.8% in April 2023.  After further discussion he revealed to me that he did not have any of his medication due to out-of-pocket expense.  Since that time he has been referred to chronic care management and is now getting patient assistance for his medications.    Over the past 6 weeks he has been taking Jardiance 25mg daily, and injecting Tresiba 55 units daily, in addition to Ozempic 0.5mg weekly. He now has a glucometer but has not been using it. He would like a CGM but does not have a smart phone. Urinary frequency has improved significantly - he is not having frequent thirst or hunger. He denies any nausea or vomiting. No complaints of abdominal pain, constipation, diarrhea, rectal bleeding.     He continues to take Zetia and pravastatin for dyslipidemia.  His pravastatin dose is 80 mg and Zetia is 10 mg.  No complaints of myalgia with use.    Blood pressure is controlled.  He is taking atenolol and lisinopril.  Denies chest pain, palpitations, headaches, dizziness, shortness of breath.    Objective   Vital Signs:   Vitals:    10/18/24 0825   BP: 126/74   BP Location: Left arm   Pulse: 90   Temp: 98.1 °F (36.7 °C)   TempSrc: Temporal   SpO2: 98%   Weight: 77.6 kg (171 lb)   Height: 170.2 cm (67\")     Body mass index is 26.78 kg/m².    Wt " Readings from Last 3 Encounters:   10/18/24 77.6 kg (171 lb)   09/06/24 80.7 kg (178 lb)   08/22/24 81.6 kg (180 lb)     BP Readings from Last 3 Encounters:   10/18/24 126/74   09/06/24 114/60   08/22/24 118/68       Health Maintenance   Topic Date Due    Hepatitis B (1 of 3 - 19+ 3-dose series) Never done    TDAP/TD VACCINES (1 - Tdap) Never done    DIABETIC EYE EXAM  07/12/2023    COLORECTAL CANCER SCREENING  07/14/2024    HEMOGLOBIN A1C  01/18/2025    LUNG CANCER SCREENING  01/29/2025    ANNUAL WELLNESS VISIT  07/17/2025    URINE MICROALBUMIN  07/17/2025    LIPID PANEL  07/18/2025    BMI FOLLOWUP  08/06/2025    DIABETIC FOOT EXAM  10/18/2025    HEPATITIS C SCREENING  Completed    Pneumococcal Vaccine 0-64  Completed    INFLUENZA VACCINE  Completed    COVID-19 Vaccine  Discontinued    ZOSTER VACCINE  Discontinued       Physical Exam  Vitals reviewed.   Constitutional:       General: He is not in acute distress.     Appearance: He is well-developed. He is obese. He is not ill-appearing.   HENT:      Head: Normocephalic and atraumatic.   Eyes:      General: No scleral icterus.        Right eye: No discharge.         Left eye: No discharge.      Extraocular Movements: Extraocular movements intact.      Conjunctiva/sclera: Conjunctivae normal.   Cardiovascular:      Rate and Rhythm: Normal rate and regular rhythm.      Pulses:           Dorsalis pedis pulses are 1+ on the right side and 1+ on the left side.        Posterior tibial pulses are 1+ on the right side and 1+ on the left side.      Heart sounds: No murmur heard.  Pulmonary:      Effort: Pulmonary effort is normal.      Breath sounds: Normal breath sounds. No wheezing, rhonchi or rales.   Musculoskeletal:         General: Normal range of motion.      Cervical back: Normal range of motion.      Right lower leg: No edema.      Left lower leg: No edema.      Right foot: Normal range of motion. No deformity or bunion.      Left foot: Normal range of motion. No  deformity or bunion.        Feet:    Feet:      Right foot:      Protective Sensation: 9 sites tested.  8 sites sensed.      Skin integrity: Skin integrity normal. No ulcer or blister.      Toenail Condition: Right toenails are normal.      Left foot:      Protective Sensation: 9 sites tested.  8 sites sensed.      Skin integrity: Skin integrity normal. No ulcer or blister.      Toenail Condition: Left toenails are normal.      Comments: Diabetic Foot Exam Performed and Monofilament Test Performed - decreased sensation with dorsal touch.      Skin:     General: Skin is warm and dry.   Neurological:      Mental Status: He is alert and oriented to person, place, and time.   Psychiatric:         Mood and Affect: Mood and affect normal.         Behavior: Behavior normal.         Thought Content: Thought content normal.         Judgment: Judgment normal.         Result Review :  The following data was reviewed by: BIENVENIDO Ross on 10/18/2024:    No visits with results within 1 Month(s) from this visit.   Latest known visit with results is:   Office Visit on 08/08/2024   Component Date Value    Color 08/08/2024 Yellow     Clarity, UA 08/08/2024 Clear     Specific Gravity  08/08/2024 1.005     pH, Urine 08/08/2024 5.5     Leukocytes 08/08/2024 Negative     Nitrite, UA 08/08/2024 Negative     Protein, POC 08/08/2024 Negative     Glucose, UA 08/08/2024 500 mg/dL (A)     Ketones, UA 08/08/2024 Negative     Urobilinogen, UA 08/08/2024 0.2 E.U./dL     Bilirubin 08/08/2024 Negative     Blood, UA 08/08/2024 Negative     Lot Number 08/08/2024 [id number]     Expiration Date 08/08/2024 [id number]      Common labs          2/28/2024    10:06 7/17/2024    15:53 7/18/2024    08:32   Common Labs   Glucose 255   339    BUN 16   23    Creatinine 1.16   1.25    Sodium 136   134    Potassium 4.9   4.6    Chloride 100   95    Calcium 9.3   9.8    Albumin 4.1   3.7    Total Bilirubin 0.5   0.3    Alkaline Phosphatase 101   122     AST (SGOT) 15   13    ALT (SGPT) 12   9    WBC 11.84   12.69    Hemoglobin 17.9   17.6    Hematocrit 53.8   53.6    Platelets 386   372    Total Cholesterol 116   135    Triglycerides 158   289    HDL Cholesterol 45   35    LDL Cholesterol  44   55    Hemoglobin A1C 10.60   13.40    Microalbumin, Urine  <1.2     PSA   0.484      TSH          10/27/2023    12:13 7/18/2024    08:32   TSH   TSH 1.960  4.720      SCANNED - COLOGUARD (07/14/2021)     Procedures        Assessment and Plan   Diagnoses and all orders for this visit:    1. Type 2 diabetes mellitus with hyperglycemia, with long-term current use of insulin (Primary)  Comments:  Reassess A1c today.  We will make adjustments in Ozempic pending outcome of A1c.  Orders:  -     Comprehensive Metabolic Panel  -     Hemoglobin A1c  -     Lipid Panel  -     Microalbumin / Creatinine Urine Ratio - Urine, Clean Catch    2. Encounter for diabetic foot exam    3. Primary hypertension  Comments:  Controlled    4. Mixed hyperlipidemia  Comments:  Reassess fasting lipid profile today  Orders:  -     Comprehensive Metabolic Panel  -     Lipid Panel    5. Need for influenza vaccination  -     Fluzone >6mos (0825-0661)    6. Need for Tdap vaccination    7. Colon cancer screening  -     Cologuard - Stool, Per Rectum; Future    8. Abnormal finding on thyroid function test  -     TSH Rfx On Abnormal To Free T4                  FOLLOW UP  Return in about 3 months (around 1/18/2025) for Next scheduled follow up.    I have encouraged him to check his blood sugar at least 3 times per week fasting and record.  Currently he is not taking his blood sugar whatsoever.  Continue Jardiance, Tresiba, and Ozempic as prescribed, but I will consider making adjustments pending outcome of A1c.  His hyperglycemia symptoms are improving, specifically urinary urgency and frequency.    Continue pravastatin and Zetia for dyslipidemia.  We will repeat lipid profile today.      Blood pressure is  controlled on current meds.  No change.    He did have an abnormal TSH in July so we will also recheck that today.    Patient was given instructions and counseling regarding his condition or for health maintenance advice. Please see specific information pulled into the AVS if appropriate.       Brittni Birmingham, APRN  10/18/24  08:56 EDT    CURRENT & DISCONTINUED MEDICATIONS  Current Outpatient Medications   Medication Instructions    aspirin 81 mg, Oral, Daily    atenolol (TENORMIN) 25 mg, Oral, Daily    empagliflozin (JARDIANCE) 25 mg, Oral, Daily    ezetimibe (ZETIA) 10 mg, Oral, Daily    famotidine (PEPCID) 20 MG tablet TAKE 1 TABLET BY MOUTH TWICE DAILY FOR HEARTBURN    Fluticasone Furoate-Vilanterol (Breo Ellipta) 100-25 MCG/ACT aerosol powder  1 puff, Inhalation, Daily - RT    glucose blood (FREESTYLE LITE) test strip 1 each, Other, Daily, To check fasting    lisinopril (PRINIVIL,ZESTRIL) 5 mg, Oral, Daily    pravastatin (PRAVACHOL) 80 mg, Oral, Daily    Semaglutide,0.25 or 0.5MG/DOS, (Ozempic, 0.25 or 0.5 MG/DOSE,) 2 MG/3ML solution pen-injector Inject 0.25 mg under the skin into the appropriate area as directed 1 (One) Time Per Week for 28 days, THEN 0.5 mg 1 (One) Time Per Week for 70 days.    Tresiba FlexTouch 60 Units, Subcutaneous, Daily    vitamin B-12 (CYANOCOBALAMIN) 1,000 mcg, Oral, Daily       Medications Discontinued During This Encounter   Medication Reason    Clotrimazole 1 % ointment *Therapy completed

## 2024-10-25 ENCOUNTER — PATIENT OUTREACH (OUTPATIENT)
Dept: CASE MANAGEMENT | Facility: OTHER | Age: 59
End: 2024-10-25
Payer: MEDICARE

## 2024-10-25 NOTE — OUTREACH NOTE
AMBULATORY CASE MANAGEMENT NOTE    Names and Relationships of Patient/Support Persons: Contact: Paperwork; Relationship: Other -     Care Coordination    Completed ozempic increase paperwork per PCP request.    Care Coordination    Placed on PCP desk for signature.        Education Documentation  No documentation found.        ARELY YING  Ambulatory Case Management    10/25/2024, 10:42 EDT

## 2024-10-29 ENCOUNTER — PATIENT OUTREACH (OUTPATIENT)
Dept: CASE MANAGEMENT | Facility: OTHER | Age: 59
End: 2024-10-29
Payer: MEDICARE

## 2024-10-29 NOTE — OUTREACH NOTE
AMBULATORY CASE MANAGEMENT NOTE    Names and Relationships of Patient/Support Persons: Contact: NovoNordisk; Relationship: Other -     Care Coordination    Faxed NovoNordisk dose increase request.    Education Documentation  No documentation found.        ARELY YING  Ambulatory Case Management    10/29/2024, 07:50 EDT

## 2024-11-05 ENCOUNTER — PATIENT OUTREACH (OUTPATIENT)
Dept: CASE MANAGEMENT | Facility: OTHER | Age: 59
End: 2024-11-05
Payer: MEDICARE

## 2024-11-05 NOTE — OUTREACH NOTE
AMBULATORY CASE MANAGEMENT NOTE    Names and Relationships of Patient/Support Persons: Contact: Milton; Relationship: Other -     Care Coordination    Spoke with Milton to inquire on status of dose increase. Rep states that this will need to be sent on a reorder form. She has emailed me a copy of this to be filled out and sent back in.    Education Documentation  No documentation found.        ARELY YING  Ambulatory Case Management    11/5/2024, 15:57 EST

## 2024-11-06 ENCOUNTER — PATIENT OUTREACH (OUTPATIENT)
Dept: CASE MANAGEMENT | Facility: OTHER | Age: 59
End: 2024-11-06
Payer: MEDICARE

## 2024-11-06 NOTE — OUTREACH NOTE
AMBULATORY CASE MANAGEMENT NOTE    Names and Relationships of Patient/Support Persons: Contact: PCP; Relationship: Other -     Care Coordination    Filled out reorder form from [employer].     Emailed to PCP office for print and signature.    Sent secure chat to PCP to inform her of the new form.    Education Documentation  No documentation found.        ARELY YING  Ambulatory Case Management    11/6/2024, 10:41 EST

## 2024-11-08 ENCOUNTER — PATIENT OUTREACH (OUTPATIENT)
Dept: CASE MANAGEMENT | Facility: OTHER | Age: 59
End: 2024-11-08
Payer: MEDICARE

## 2024-11-08 DIAGNOSIS — E11.65 TYPE 2 DIABETES MELLITUS WITH HYPERGLYCEMIA, WITH LONG-TERM CURRENT USE OF INSULIN: Primary | ICD-10-CM

## 2024-11-08 DIAGNOSIS — Z79.4 TYPE 2 DIABETES MELLITUS WITH HYPERGLYCEMIA, WITH LONG-TERM CURRENT USE OF INSULIN: Primary | ICD-10-CM

## 2024-11-08 NOTE — OUTREACH NOTE
AMBULATORY CASE MANAGEMENT NOTE    Names and Relationships of Patient/Support Persons: Contact: NovoNordisk; Relationship: Other  Contact: PCP; Relationship: Other -     Care Coordination    Faxed NovoNordisk dose increase form.    Updated med list to reflect increase dose of Ozempic and sent telephone note to PCP to sign for this.    Education Documentation  No documentation found.        ARELY YING  Ambulatory Case Management    11/8/2024, 09:57 EST

## 2024-11-08 NOTE — TELEPHONE ENCOUNTER
Brittni,  I have updated patients med list to reflect the increase of Ozempic to 1 mg. Please sign so that med list will be accurate. I have set the script to no print, so it will not go anywhere.    Thank you!  DANNIE Matson  
Impaired gait/Medication side effects/Poor balance/Weakness

## 2024-11-14 ENCOUNTER — PATIENT OUTREACH (OUTPATIENT)
Dept: CASE MANAGEMENT | Facility: OTHER | Age: 59
End: 2024-11-14
Payer: MEDICARE

## 2024-11-14 NOTE — OUTREACH NOTE
AMBULATORY CASE MANAGEMENT NOTE    Names and Relationships of Patient/Support Persons: Contact: Milton; Relationship: Other -     Care Coordination    Called and spoke with [employer] to check status of Ozempic increase. They state this has been processed and is in the process of shipping but there is not a tracking number yet.    Patient can re-enroll for 2025 at any time.    I have completed patients 2025 paperwork. Will have patient sign when he comes to office to  Ozempic 1 mg dose.    Education Documentation  No documentation found.        ARELY YING  Ambulatory Case Management    11/14/2024, 12:19 EST

## 2024-11-15 ENCOUNTER — TELEPHONE (OUTPATIENT)
Dept: CASE MANAGEMENT | Facility: OTHER | Age: 59
End: 2024-11-15
Payer: MEDICARE

## 2024-11-15 NOTE — TELEPHONE ENCOUNTER
Printed PAP and placed in front office for signatures. Attempted to call patient to inform that forms are ready and Ozempic is at office ready for . No answer, no vm.

## 2024-11-18 ENCOUNTER — TELEPHONE (OUTPATIENT)
Dept: CASE MANAGEMENT | Facility: OTHER | Age: 59
End: 2024-11-18
Payer: MEDICARE

## 2024-11-18 NOTE — TELEPHONE ENCOUNTER
Attempted to call to advise patient that Ozempic was at office for , and 2025 forms are ready for signature. No answer, no vm at this time.

## 2024-11-21 ENCOUNTER — PATIENT OUTREACH (OUTPATIENT)
Dept: CASE MANAGEMENT | Facility: OTHER | Age: 59
End: 2024-11-21
Payer: MEDICARE

## 2024-11-21 NOTE — OUTREACH NOTE
AMBULATORY CASE MANAGEMENT NOTE    Names and Relationships of Patient/Support Persons: Contact: OTTO GUTIERREZ; Relationship: Emergency Contact -     Patient Outreach    Called to speak with patient about Ozempic at office and signatures needed. Spouse states that he is asleep right now but she will relay the message to him.     Education Documentation  No documentation found.        ARELY YING  Ambulatory Case Management    11/21/2024, 10:37 EST

## 2024-11-25 ENCOUNTER — PATIENT OUTREACH (OUTPATIENT)
Dept: CASE MANAGEMENT | Facility: OTHER | Age: 59
End: 2024-11-25
Payer: MEDICARE

## 2024-11-25 NOTE — OUTREACH NOTE
AMBULATORY CASE MANAGEMENT NOTE    Names and Relationships of Patient/Support Persons: Contact: PCP & Paperwork; Relationship: Other -     Care Coordination    Received signed PAP from patient.     Met with PCP face to face. She signed PAP form. Placed in scan pile.     Discussed patient improvement with PCP.     Education Documentation  No documentation found.        ARELY YING  Ambulatory Case Management    11/25/2024, 15:33 EST

## 2024-11-26 ENCOUNTER — PATIENT OUTREACH (OUTPATIENT)
Dept: CASE MANAGEMENT | Facility: OTHER | Age: 59
End: 2024-11-26
Payer: MEDICARE

## 2024-11-26 NOTE — OUTREACH NOTE
AMBULATORY CASE MANAGEMENT NOTE    Names and Relationships of Patient/Support Persons: Contact: [facility]; Relationship: Other -     Care Coordination    [facility]    Education Documentation  No documentation found.        ARELY YING  Ambulatory Case Management    11/26/2024, 14:10 EST

## 2024-12-03 ENCOUNTER — PATIENT OUTREACH (OUTPATIENT)
Dept: CASE MANAGEMENT | Facility: OTHER | Age: 59
End: 2024-12-03
Payer: MEDICARE

## 2024-12-03 NOTE — OUTREACH NOTE
AMBULATORY CASE MANAGEMENT NOTE    Names and Relationships of Patient/Support Persons: Contact: Milton; Relationship: Other -     Care Coordination    Called and spoke with Milton. [facility]     Patient Outreach    Attempted to call patient to inform of above and inquire on Jardiance application if needed, no answer no vm picked up.    Education Documentation  No documentation found.        ARELY YING  Ambulatory Case Management    12/3/2024, 12:26 EST

## 2025-01-03 ENCOUNTER — PATIENT OUTREACH (OUTPATIENT)
Dept: CASE MANAGEMENT | Facility: OTHER | Age: 60
End: 2025-01-03
Payer: MEDICARE

## 2025-01-03 NOTE — OUTREACH NOTE
"AMBULATORY CASE MANAGEMENT NOTE    Names and Relationships of Patient/Support Persons: Contact: Zechariah Fleming \"DEENA\"; Relationship: Self -     Patient Outreach    Spoke with patient to inform that Ozempic was approved for 2025. He states that he does need Jardiance PAP completed as well.    Care Coordination    Completed PAP for Jardiance and emailed to PCP office for patients signature.    Education Documentation  No documentation found.        ARELY YING  Ambulatory Case Management    1/3/2025, 11:55 EST  "

## 2025-01-24 ENCOUNTER — PATIENT OUTREACH (OUTPATIENT)
Dept: CASE MANAGEMENT | Facility: OTHER | Age: 60
End: 2025-01-24
Payer: MEDICARE

## 2025-01-24 NOTE — OUTREACH NOTE
AMBULATORY CASE MANAGEMENT NOTE    Names and Relationships of Patient/Support Persons: Contact: Paperwork; Relationship: Other -     Care Coordination    Received signed PAP from patient. Placed on PCP desk for signature.    Education Documentation  No documentation found.        ARELY YING  Ambulatory Case Management    1/24/2025, 13:15 EST

## 2025-01-27 ENCOUNTER — PATIENT OUTREACH (OUTPATIENT)
Dept: CASE MANAGEMENT | Facility: OTHER | Age: 60
End: 2025-01-27
Payer: MEDICARE

## 2025-01-27 NOTE — OUTREACH NOTE
AMBULATORY CASE MANAGEMENT NOTE    Names and Relationships of Patient/Support Persons: Contact: Paperwork; Relationship: Other -     Care Coordination    Received signed PAP from PCP. Placed in scan pile.    Education Documentation  No documentation found.        ARELY YING  Ambulatory Case Management    1/27/2025, 12:42 EST

## 2025-01-28 ENCOUNTER — PATIENT OUTREACH (OUTPATIENT)
Dept: CASE MANAGEMENT | Facility: OTHER | Age: 60
End: 2025-01-28
Payer: MEDICARE

## 2025-01-28 NOTE — OUTREACH NOTE
AMBULATORY CASE MANAGEMENT NOTE    Names and Relationships of Patient/Support Persons: Contact: NovoNordisk; Relationship: Other -     Care Coordination    Faxed PAP, income & insurance cards to [facility].    Education Documentation  No documentation found.        ARELY YING  Ambulatory Case Management    1/28/2025, 11:25 EST

## 2025-01-31 ENCOUNTER — OFFICE VISIT (OUTPATIENT)
Dept: FAMILY MEDICINE CLINIC | Facility: CLINIC | Age: 60
End: 2025-01-31
Payer: MEDICARE

## 2025-01-31 VITALS
HEIGHT: 67 IN | SYSTOLIC BLOOD PRESSURE: 124 MMHG | WEIGHT: 185 LBS | DIASTOLIC BLOOD PRESSURE: 76 MMHG | HEART RATE: 97 BPM | BODY MASS INDEX: 29.03 KG/M2 | OXYGEN SATURATION: 99 % | TEMPERATURE: 97.8 F

## 2025-01-31 DIAGNOSIS — J42 CHRONIC BRONCHITIS, UNSPECIFIED CHRONIC BRONCHITIS TYPE: ICD-10-CM

## 2025-01-31 DIAGNOSIS — I25.10 CORONARY ARTERY CALCIFICATION SEEN ON CT SCAN: ICD-10-CM

## 2025-01-31 DIAGNOSIS — E53.8 B12 DEFICIENCY: ICD-10-CM

## 2025-01-31 DIAGNOSIS — Z79.4 TYPE 2 DIABETES MELLITUS WITH HYPERGLYCEMIA, WITH LONG-TERM CURRENT USE OF INSULIN: Primary | ICD-10-CM

## 2025-01-31 DIAGNOSIS — Z12.2 SCREENING FOR LUNG CANCER: ICD-10-CM

## 2025-01-31 DIAGNOSIS — M25.511 CHRONIC RIGHT SHOULDER PAIN: ICD-10-CM

## 2025-01-31 DIAGNOSIS — E78.2 MIXED HYPERLIPIDEMIA: ICD-10-CM

## 2025-01-31 DIAGNOSIS — E11.65 TYPE 2 DIABETES MELLITUS WITH HYPERGLYCEMIA, WITH LONG-TERM CURRENT USE OF INSULIN: Primary | ICD-10-CM

## 2025-01-31 DIAGNOSIS — G89.29 CHRONIC RIGHT SHOULDER PAIN: ICD-10-CM

## 2025-01-31 DIAGNOSIS — I10 PRIMARY HYPERTENSION: ICD-10-CM

## 2025-01-31 DIAGNOSIS — F17.218 CIGARETTE NICOTINE DEPENDENCE WITH OTHER NICOTINE-INDUCED DISORDER: ICD-10-CM

## 2025-01-31 LAB
ALBUMIN SERPL-MCNC: 4.3 G/DL (ref 3.5–5.2)
ALBUMIN UR-MCNC: 12.6 MG/DL
ALBUMIN/GLOB SERPL: 1.2 G/DL
ALP SERPL-CCNC: 83 U/L (ref 39–117)
ALT SERPL W P-5'-P-CCNC: 12 U/L (ref 1–41)
ANION GAP SERPL CALCULATED.3IONS-SCNC: 8.1 MMOL/L (ref 5–15)
AST SERPL-CCNC: 17 U/L (ref 1–40)
BASOPHILS # BLD AUTO: 0.1 10*3/MM3 (ref 0–0.2)
BASOPHILS NFR BLD AUTO: 0.7 % (ref 0–1.5)
BILIRUB SERPL-MCNC: 0.4 MG/DL (ref 0–1.2)
BUN SERPL-MCNC: 14 MG/DL (ref 6–20)
BUN/CREAT SERPL: 16.9 (ref 7–25)
CALCIUM SPEC-SCNC: 9.7 MG/DL (ref 8.6–10.5)
CHLORIDE SERPL-SCNC: 101 MMOL/L (ref 98–107)
CHOLEST SERPL-MCNC: 120 MG/DL (ref 0–200)
CO2 SERPL-SCNC: 26.9 MMOL/L (ref 22–29)
CREAT SERPL-MCNC: 0.83 MG/DL (ref 0.76–1.27)
CREAT UR-MCNC: 211.1 MG/DL
DEPRECATED RDW RBC AUTO: 43.7 FL (ref 37–54)
EGFRCR SERPLBLD CKD-EPI 2021: 100.8 ML/MIN/1.73
EOSINOPHIL # BLD AUTO: 0.82 10*3/MM3 (ref 0–0.4)
EOSINOPHIL NFR BLD AUTO: 6.1 % (ref 0.3–6.2)
ERYTHROCYTE [DISTWIDTH] IN BLOOD BY AUTOMATED COUNT: 13.9 % (ref 12.3–15.4)
FOLATE SERPL-MCNC: 13.3 NG/ML (ref 4.78–24.2)
GLOBULIN UR ELPH-MCNC: 3.6 GM/DL
GLUCOSE SERPL-MCNC: 89 MG/DL (ref 65–99)
HBA1C MFR BLD: 9.8 % (ref 4.8–5.6)
HCT VFR BLD AUTO: 49.2 % (ref 37.5–51)
HDLC SERPL-MCNC: 54 MG/DL (ref 40–60)
HGB BLD-MCNC: 16.8 G/DL (ref 13–17.7)
IMM GRANULOCYTES # BLD AUTO: 0.04 10*3/MM3 (ref 0–0.05)
IMM GRANULOCYTES NFR BLD AUTO: 0.3 % (ref 0–0.5)
LDLC SERPL CALC-MCNC: 47 MG/DL (ref 0–100)
LDLC/HDLC SERPL: 0.83 {RATIO}
LYMPHOCYTES # BLD AUTO: 4.89 10*3/MM3 (ref 0.7–3.1)
LYMPHOCYTES NFR BLD AUTO: 36.2 % (ref 19.6–45.3)
MCH RBC QN AUTO: 29.5 PG (ref 26.6–33)
MCHC RBC AUTO-ENTMCNC: 34.1 G/DL (ref 31.5–35.7)
MCV RBC AUTO: 86.3 FL (ref 79–97)
MICROALBUMIN/CREAT UR: 59.7 MG/G (ref 0–29)
MONOCYTES # BLD AUTO: 1.01 10*3/MM3 (ref 0.1–0.9)
MONOCYTES NFR BLD AUTO: 7.5 % (ref 5–12)
NEUTROPHILS NFR BLD AUTO: 49.2 % (ref 42.7–76)
NEUTROPHILS NFR BLD AUTO: 6.63 10*3/MM3 (ref 1.7–7)
NRBC BLD AUTO-RTO: 0 /100 WBC (ref 0–0.2)
PLATELET # BLD AUTO: 300 10*3/MM3 (ref 140–450)
PMV BLD AUTO: 10.7 FL (ref 6–12)
POTASSIUM SERPL-SCNC: 4.3 MMOL/L (ref 3.5–5.2)
PROT SERPL-MCNC: 7.9 G/DL (ref 6–8.5)
RBC # BLD AUTO: 5.7 10*6/MM3 (ref 4.14–5.8)
SODIUM SERPL-SCNC: 136 MMOL/L (ref 136–145)
T4 FREE SERPL-MCNC: 0.95 NG/DL (ref 0.92–1.68)
TRIGL SERPL-MCNC: 106 MG/DL (ref 0–150)
TSH SERPL DL<=0.05 MIU/L-ACNC: 3.02 UIU/ML (ref 0.27–4.2)
VIT B12 BLD-MCNC: 488 PG/ML (ref 211–946)
VLDLC SERPL-MCNC: 19 MG/DL (ref 5–40)
WBC NRBC COR # BLD AUTO: 13.49 10*3/MM3 (ref 3.4–10.8)

## 2025-01-31 PROCEDURE — 85025 COMPLETE CBC W/AUTO DIFF WBC: CPT | Performed by: NURSE PRACTITIONER

## 2025-01-31 PROCEDURE — G2211 COMPLEX E/M VISIT ADD ON: HCPCS | Performed by: NURSE PRACTITIONER

## 2025-01-31 PROCEDURE — 83036 HEMOGLOBIN GLYCOSYLATED A1C: CPT | Performed by: NURSE PRACTITIONER

## 2025-01-31 PROCEDURE — 84443 ASSAY THYROID STIM HORMONE: CPT | Performed by: NURSE PRACTITIONER

## 2025-01-31 PROCEDURE — 82607 VITAMIN B-12: CPT | Performed by: NURSE PRACTITIONER

## 2025-01-31 PROCEDURE — 84439 ASSAY OF FREE THYROXINE: CPT | Performed by: NURSE PRACTITIONER

## 2025-01-31 PROCEDURE — 82570 ASSAY OF URINE CREATININE: CPT | Performed by: NURSE PRACTITIONER

## 2025-01-31 PROCEDURE — 1126F AMNT PAIN NOTED NONE PRSNT: CPT | Performed by: NURSE PRACTITIONER

## 2025-01-31 PROCEDURE — 99214 OFFICE O/P EST MOD 30 MIN: CPT | Performed by: NURSE PRACTITIONER

## 2025-01-31 PROCEDURE — 80061 LIPID PANEL: CPT | Performed by: NURSE PRACTITIONER

## 2025-01-31 PROCEDURE — 82746 ASSAY OF FOLIC ACID SERUM: CPT | Performed by: NURSE PRACTITIONER

## 2025-01-31 PROCEDURE — 3074F SYST BP LT 130 MM HG: CPT | Performed by: NURSE PRACTITIONER

## 2025-01-31 PROCEDURE — 3078F DIAST BP <80 MM HG: CPT | Performed by: NURSE PRACTITIONER

## 2025-01-31 PROCEDURE — 1160F RVW MEDS BY RX/DR IN RCRD: CPT | Performed by: NURSE PRACTITIONER

## 2025-01-31 PROCEDURE — 1159F MED LIST DOCD IN RCRD: CPT | Performed by: NURSE PRACTITIONER

## 2025-01-31 PROCEDURE — 80053 COMPREHEN METABOLIC PANEL: CPT | Performed by: NURSE PRACTITIONER

## 2025-01-31 PROCEDURE — 82043 UR ALBUMIN QUANTITATIVE: CPT | Performed by: NURSE PRACTITIONER

## 2025-01-31 RX ORDER — INSULIN DEGLUDEC 200 U/ML
60 INJECTION, SOLUTION SUBCUTANEOUS DAILY
Qty: 30 ML | Refills: 1 | Status: SHIPPED | OUTPATIENT
Start: 2025-01-31

## 2025-01-31 RX ORDER — ATENOLOL 25 MG/1
25 TABLET ORAL DAILY
Qty: 90 TABLET | Refills: 1 | Status: SHIPPED | OUTPATIENT
Start: 2025-01-31

## 2025-01-31 RX ORDER — ASPIRIN 81 MG/1
81 TABLET ORAL DAILY
Qty: 90 TABLET | Refills: 3 | Status: SHIPPED | OUTPATIENT
Start: 2025-01-31

## 2025-01-31 RX ORDER — LISINOPRIL 5 MG/1
5 TABLET ORAL DAILY
Qty: 90 TABLET | Refills: 1 | Status: SHIPPED | OUTPATIENT
Start: 2025-01-31

## 2025-01-31 RX ORDER — PRAVASTATIN SODIUM 80 MG/1
80 TABLET ORAL DAILY
Qty: 90 TABLET | Refills: 1 | Status: SHIPPED | OUTPATIENT
Start: 2025-01-31

## 2025-01-31 RX ORDER — FLUTICASONE FUROATE AND VILANTEROL 100; 25 UG/1; UG/1
1 POWDER RESPIRATORY (INHALATION)
Qty: 180 EACH | Refills: 1 | Status: SHIPPED | OUTPATIENT
Start: 2025-01-31

## 2025-01-31 RX ORDER — EZETIMIBE 10 MG/1
10 TABLET ORAL DAILY
Qty: 90 TABLET | Refills: 1 | Status: SHIPPED | OUTPATIENT
Start: 2025-01-31

## 2025-01-31 RX ORDER — LISINOPRIL 5 MG/1
5 TABLET ORAL DAILY
Qty: 90 TABLET | Refills: 1 | OUTPATIENT
Start: 2025-01-31

## 2025-01-31 RX ORDER — LANOLIN ALCOHOL/MO/W.PET/CERES
1000 CREAM (GRAM) TOPICAL DAILY
Qty: 90 TABLET | Refills: 1 | Status: SHIPPED | OUTPATIENT
Start: 2025-01-31

## 2025-01-31 RX ORDER — PRAVASTATIN SODIUM 80 MG/1
80 TABLET ORAL DAILY
Qty: 90 TABLET | Refills: 1 | OUTPATIENT
Start: 2025-01-31

## 2025-01-31 NOTE — PROGRESS NOTES
Chief Complaint  Diabetes (Follow up,labs/Pt is fasting), Hyperlipidemia, and Hypertension    History of Present Illness  Zechariah Fleming is a 59 y.o. male who presents to [facility] with a past medical history of    Past Medical History:   Diagnosis Date    Arthritis 6/9/2022    Mixed hyperlipidemia 6/9/2022    Primary hypertension 6/9/2022    Type 2 diabetes mellitus with hyperglycemia, with long-term current use of insulin 6/9/2022       History of Present Illness  The patient is a 59-year-old male who presents to the office today for follow-up on chronic health conditions and medication refills.    He has been experiencing recurrent shoulder spurs, with a history of four shoulder surgeries, two on each side. He recalls an incident where he was unable to move his arm due to it being stuck, but after manipulation by a doctor, he regained full mobility. Currently, he experiences intermittent soreness and stiffness in his shoulder upon lying down and getting up. He reports no chest pain, palpitations, headaches, or dizziness.    He continues his regimen of B12 supplements and has discontinued famotidine as he no longer experiences heartburn. He has not consumed Rolaids or Tums for the past 6 to 7 years. He has ceased aspirin intake due to its side effect of causing bruising on his arms -but that was with a regular aspirin and not a baby aspirin.  He is currently on pravastatin and Zetia for cholesterol management, with no reported muscle cramping or pain. He does not use an inhaler and reports no wheezing or shortness of breath.     He admits to smoking and has undergone lung cancer screening a year ago.  Recall in 1 year recommended, and he is currently due.  He states that he does not wish to quit smoking, and there is no one on this planet that will encourage him to do so.  He believes that it is the 1 thing that he has in his life that he can enjoy.  He verbalizes awareness of  "the risk associated with chronic nicotine dependence and smoking including but not limited to COPD, heart disease, and cancer.  He currently denies any cough, Wheeze, or shortness of breath.    He has completed his colorectal cancer screening with [facility].     He had an eye exam within the last 6 months at [facility]. He is also on semaglutide injections. He has been mindful of his sugar intake during the holiday season.  He has been able to get his semaglutide through patient assistance and is currently on 1 mg weekly.  He denies nausea, vomiting, abdominal pain, constipation, or diarrhea.    He reports a sensation of fullness in his ears, which he attributes to dirt or wax accumulation. He uses Q-tips for ear cleaning 2 to 3 times a week.    SOCIAL HISTORY  The patient admits to smoking and has no plans to quit.    ALLERGIES  The patient has an allergic reaction to ASPIRIN, which causes bruising on the arms.    MEDICATIONS  Current: B12, pravastatin, Zetia, semaglutide  Discontinued: famotidine, ASPIRIN      Objective   Vital Signs:   Vitals:    01/31/25 0823   BP: 124/76   BP Location: Left arm   Pulse: 97   Temp: 97.8 °F (36.6 °C)   TempSrc: Temporal   SpO2: 99%   Weight: 83.9 kg (185 lb)   Height: 170.2 cm (67\")     Body mass index is 28.98 kg/m².    Wt Readings from Last 3 Encounters:   01/31/25 83.9 kg (185 lb)   10/18/24 77.6 kg (171 lb)   09/06/24 80.7 kg (178 lb)     BP Readings from Last 3 Encounters:   01/31/25 124/76   10/18/24 126/74   09/06/24 114/60       Health Maintenance   Topic Date Due    TDAP/TD VACCINES (1 - Tdap) Never done    DIABETIC EYE EXAM  07/12/2023    LUNG CANCER SCREENING  01/29/2025    HEMOGLOBIN A1C  04/18/2025    ANNUAL WELLNESS VISIT  07/17/2025    PROSTATE CANCER SCREENING  07/18/2025    DIABETIC FOOT EXAM  10/18/2025    LIPID PANEL  10/18/2025    BMI FOLLOWUP  11/08/2025    COLORECTAL CANCER SCREENING  01/06/2028    HEPATITIS C SCREENING  Completed    Pneumococcal " Vaccine 0-64  Completed    INFLUENZA VACCINE  Completed    Hepatitis B  Discontinued    COVID-19 Vaccine  Discontinued    URINE MICROALBUMIN  Discontinued    ZOSTER VACCINE  Discontinued       Physical Exam  Vitals reviewed.   Constitutional:       General: He is not in acute distress.     Appearance: He is well-developed and overweight. He is not ill-appearing.   HENT:      Head: Normocephalic and atraumatic.      Right Ear: Tympanic membrane, ear canal and external ear normal. There is no impacted cerumen.      Left Ear: Tympanic membrane, ear canal and external ear normal. There is no impacted cerumen.      Ears:      Comments: Nothing on ear examination that would explain his symptoms.     Nose: Nose normal.      Mouth/Throat:      Mouth: Mucous membranes are moist.      Pharynx: Oropharynx is clear. No oropharyngeal exudate or posterior oropharyngeal erythema.   Eyes:      General: No scleral icterus.        Right eye: No discharge.         Left eye: No discharge.      Extraocular Movements: Extraocular movements intact.      Conjunctiva/sclera: Conjunctivae normal.   Neck:      Thyroid: No thyromegaly.      Vascular: No carotid bruit.      Trachea: Trachea normal.   Cardiovascular:      Rate and Rhythm: Normal rate and regular rhythm.      Pulses: Normal pulses.      Heart sounds: No murmur heard.  Pulmonary:      Effort: Pulmonary effort is normal.      Breath sounds: Normal breath sounds. No wheezing, rhonchi or rales.   Musculoskeletal:         General: Normal range of motion.      Right shoulder: Tenderness and crepitus present. No swelling, deformity, effusion or bony tenderness. Normal range of motion.      Cervical back: Normal range of motion and neck supple. No tenderness.      Right lower leg: No edema.      Left lower leg: No edema.   Lymphadenopathy:      Cervical: No cervical adenopathy.   Skin:     General: Skin is warm and dry.   Neurological:      Mental Status: He is alert and oriented to  person, place, and time.   Psychiatric:         Mood and Affect: Mood and affect normal.         Behavior: Behavior normal.         Thought Content: Thought content normal.         Judgment: Judgment normal.            Result Review :  The following data was reviewed by: BIENVENIDO Ross on 01/31/2025:    Common labs          7/17/2024    15:53 7/18/2024    08:32 10/18/2024    08:49   Common Labs   Glucose  339  65    BUN  23  15    Creatinine  1.25  0.83    Sodium  134  142    Potassium  4.6  4.3    Chloride  95  101    Calcium  9.8  9.8    Albumin  3.7  4.0    Total Bilirubin  0.3  0.3    Alkaline Phosphatase  122  101    AST (SGOT)  13  14    ALT (SGPT)  9  11    WBC  12.69     Hemoglobin  17.6     Hematocrit  53.6     Platelets  372     Total Cholesterol  135  104    Triglycerides  289  84    HDL Cholesterol  35  50    LDL Cholesterol   55  37    Hemoglobin A1C  13.40  11.60    Microalbumin, Urine <1.2   11.8    PSA  0.484       CT Chest Low Dose Cancer Screening WO (01/29/2024 10:21)       Procedures        Assessment and Plan   Diagnoses and all orders for this visit:    1. Type 2 diabetes mellitus with hyperglycemia, with long-term current use of insulin (Primary)  -     empagliflozin (Jardiance) 25 MG tablet tablet; Take 1 tablet by mouth Daily.  Dispense: 90 tablet; Refill: 1  -     Insulin Degludec (Tresiba FlexTouch) 200 UNIT/ML solution pen-injector pen injection; Inject 60 Units under the skin into the appropriate area as directed Daily.  Dispense: 30 mL; Refill: 1  -     Semaglutide, 1 MG/DOSE, (OZEMPIC) 4 MG/3ML solution pen-injector; Inject 1 mg under the skin into the appropriate area as directed 1 (One) Time Per Week.  -     CBC Auto Differential  -     Comprehensive Metabolic Panel  -     Hemoglobin A1c  -     Lipid Panel  -     TSH+Free T4  -     Microalbumin / Creatinine Urine Ratio - Urine, Clean Catch    2. Primary hypertension  -     atenolol (TENORMIN) 25 MG tablet; Take 1 tablet by  mouth Daily.  Dispense: 90 tablet; Refill: 1  -     lisinopril (PRINIVIL,ZESTRIL) 5 MG tablet; Take 1 tablet by mouth Daily.  Dispense: 90 tablet; Refill: 1  -     CBC Auto Differential  -     Comprehensive Metabolic Panel  -     Lipid Panel  -     TSH+Free T4  -     Microalbumin / Creatinine Urine Ratio - Urine, Clean Catch    3. Mixed hyperlipidemia  -     ezetimibe (Zetia) 10 MG tablet; Take 1 tablet by mouth Daily.  Dispense: 90 tablet; Refill: 1  -     pravastatin (PRAVACHOL) 80 MG tablet; Take 1 tablet by mouth Daily.  Dispense: 90 tablet; Refill: 1  -     Comprehensive Metabolic Panel  -     Lipid Panel    4. Coronary artery calcification seen on CT scan  -     aspirin 81 MG EC tablet; Take 1 tablet by mouth Daily.  Dispense: 90 tablet; Refill: 3    5. Chronic bronchitis, unspecified chronic bronchitis type  -     Fluticasone Furoate-Vilanterol (Breo Ellipta) 100-25 MCG/ACT aerosol powder ; Inhale 1 puff Daily.  Dispense: 180 each; Refill: 1    6. B12 deficiency  -     vitamin B-12 (CYANOCOBALAMIN) 1000 MCG tablet; Take 1 tablet by mouth Daily.  Dispense: 90 tablet; Refill: 1  -     Vitamin B12 & Folate    7. Chronic right shoulder pain    8. Screening for lung cancer  -      CT Chest Low Dose Cancer Screening WO; Future    9. Cigarette nicotine dependence with other nicotine-induced disorder  -      CT Chest Low Dose Cancer Screening WO; Future        Assessment & Plan  1. Chronic health conditions.  He is currently fasting and will undergo blood work today. His A1c level was recorded as 11.6 in October 2024. He has coronary artery calcifications identified on a CT scan. His prostate lab results from July 2024 were within normal limits. He will continue his current regimen of B12 supplements. A prescription for baby aspirin will be provided. He will maintain his current cholesterol medications, pravastatin and Zetia. He will also continue his semaglutide injections. He is due for a lung cancer screening,  which will be scheduled. He is advised to contact the office if he encounters any issues with his medications.    2. Shoulder pain.  He reports intermittent soreness and stiffness in his shoulder, particularly when lying on it. He declined an x-ray of his shoulder today. He is advised to monitor the pain and report any worsening symptoms.    3. Ear discomfort.  He reports a sensation of fullness in his ears, which he attributes to dirt or wax accumulation. He uses Q-tips for ear cleaning 2 to 3 times a week.    Follow-up  The patient is scheduled for a follow-up visit in July 2025, unless an earlier appointment is necessary.    PROCEDURE  The patient has a history of four shoulder surgeries, two on each side.                FOLLOW UP  Return in about 24 weeks (around 7/18/2025) for Subsequent Medicare Wellness, medication refills and fasting labs.    Patient was given instructions and counseling regarding his condition or for health maintenance advice. Please see specific information pulled into the AVS if appropriate.       Brittni Birmingham, APRN  01/31/25  08:50 EST    CURRENT & DISCONTINUED MEDICATIONS  Current Outpatient Medications   Medication Instructions    aspirin 81 mg, Oral, Daily    atenolol (TENORMIN) 25 mg, Oral, Daily    empagliflozin (JARDIANCE) 25 mg, Oral, Daily    ezetimibe (ZETIA) 10 mg, Oral, Daily    Fluticasone Furoate-Vilanterol (Breo Ellipta) 100-25 MCG/ACT aerosol powder  1 puff, Inhalation, Daily - RT    glucose blood (FREESTYLE LITE) test strip 1 each, Other, Daily, To check fasting    lisinopril (PRINIVIL,ZESTRIL) 5 mg, Oral, Daily    pravastatin (PRAVACHOL) 80 mg, Oral, Daily    Semaglutide (1 MG/DOSE) (OZEMPIC) 1 mg, Subcutaneous, Weekly    Tresiba FlexTouch 60 Units, Subcutaneous, Daily    vitamin B-12 (CYANOCOBALAMIN) 1,000 mcg, Oral, Daily       Medications Discontinued During This Encounter   Medication Reason    famotidine (PEPCID) 20 MG tablet Patient Reported Not Taking     aspirin 81 MG EC tablet Reorder    vitamin B-12 (CYANOCOBALAMIN) 1000 MCG tablet Reorder    ezetimibe (Zetia) 10 MG tablet Reorder    pravastatin (PRAVACHOL) 80 MG tablet Reorder    atenolol (TENORMIN) 25 MG tablet Reorder    lisinopril (PRINIVIL,ZESTRIL) 5 MG tablet Reorder    empagliflozin (Jardiance) 25 MG tablet tablet Reorder    Insulin Degludec (Tresiba FlexTouch) 200 UNIT/ML solution pen-injector pen injection Reorder    Fluticasone Furoate-Vilanterol (Breo Ellipta) 100-25 MCG/ACT aerosol powder  Reorder    Semaglutide, 1 MG/DOSE, (OZEMPIC) 4 MG/3ML solution pen-injector Reorder        Patient or patient representative verbalized consent for the use of Ambient Listening during the visit with  BIENVENIDO Ross for chart documentation. 1/31/2025  09:34 EST

## 2025-01-31 NOTE — PROGRESS NOTES
Venipuncture Blood Specimen Collection  Venipuncture performed in [place] by Stefany Fisher with good hemostasis. Patient tolerated the procedure well without complications.   01/31/25   Terri Casillas MA     Error - uploaded media file on wrong pt by error. Please disregard

## 2025-02-03 ENCOUNTER — PATIENT OUTREACH (OUTPATIENT)
Dept: CASE MANAGEMENT | Facility: OTHER | Age: 60
End: 2025-02-03
Payer: MEDICARE

## 2025-02-03 NOTE — OUTREACH NOTE
AMBULATORY CASE MANAGEMENT NOTE    Names and Relationships of Patient/Support Persons: Contact: PCP; Relationship: Other  Contact: PCP; Relationship: Other -     Care Coordination    Received inbasket message from PCP requesting Ozempic dose increase to 2 mg.     I have filled out appropriate [employer] paperwork for increase and sent to PCP office for signature.    Sent PCP secure chat to let her know that forms are on their way and once signed, I can send to [employer].    Education Documentation  No documentation found.        ARELY YING  Ambulatory Case Management    2/3/2025, 09:13 EST

## 2025-02-07 ENCOUNTER — PATIENT OUTREACH (OUTPATIENT)
Dept: CASE MANAGEMENT | Facility: OTHER | Age: 60
End: 2025-02-07
Payer: MEDICARE

## 2025-02-07 NOTE — OUTREACH NOTE
AMBULATORY CASE MANAGEMENT NOTE    Names and Relationships of Patient/Support Persons: Contact:  [employer]; Relationship: Other -     Care Coordination    Called and spoke to [facility] for status update on Jardiance application. This has not been processed yet due to peak re-enrollment season. They request that I call back in 1-2 weeks to check status. I have scheduled an outreach for 1 week from today to check.    Education Documentation  No documentation found.        ARELY YING  Ambulatory Case Management    2/7/2025, 10:05 EST

## 2025-02-10 ENCOUNTER — PATIENT OUTREACH (OUTPATIENT)
Dept: CASE MANAGEMENT | Facility: OTHER | Age: 60
End: 2025-02-10
Payer: MEDICARE

## 2025-02-10 NOTE — OUTREACH NOTE
AMBULATORY CASE MANAGEMENT NOTE    Names and Relationships of Patient/Support Persons: Contact: [employer]; Relationship: Other -     Care Coordination  [facility]    Education Documentation  No documentation found.        ARELY YING  Ambulatory Case Management    2/10/2025, 16:03 EST

## 2025-02-18 ENCOUNTER — PATIENT OUTREACH (OUTPATIENT)
Dept: CASE MANAGEMENT | Facility: OTHER | Age: 60
End: 2025-02-18
Payer: MEDICARE

## 2025-02-18 NOTE — OUTREACH NOTE
AMBULATORY CASE MANAGEMENT NOTE    Names and Relationships of Patient/Support Persons: Contact: [facility]; Relationship: Other  Contact: KATHERINE [employer]; Relationship: Other -     Care Coordination    Called [employer] for update of Ozempic increase to 2 mg. This has been processed but has not yet shipped.    Called [employer] for status of Jardiance application. Has not been processed yet.     Education Documentation  No documentation found.        ARELY YING  Ambulatory Case Management    2/18/2025, 13:33 EST

## 2025-02-25 ENCOUNTER — PATIENT OUTREACH (OUTPATIENT)
Dept: CASE MANAGEMENT | Facility: OTHER | Age: 60
End: 2025-02-25
Payer: MEDICARE

## 2025-02-25 NOTE — OUTREACH NOTE
AMBULATORY CASE MANAGEMENT NOTE    Names and Relationships of Patient/Support Persons: Contact: KATHERINE [employer]; Relationship: Other  Contact: [facility]; Relationship: Other -     Care Coordination    Called [employer]. They are behind on shipping but are hoping to be caught up by the end of this week, therefore, Ozempic 2 mg still has not shipped.    Called and spoke with  [employer] regarding Jardiance. They state application was approved but medication has not been shipped. Rep was able to get this ready for shipment while on the phone and patient should receive Jardiance within 7-10 days.    Education Documentation  No documentation found.        ARELY YING  Ambulatory Case Management    2/25/2025, 10:22 EST

## 2025-03-07 ENCOUNTER — PATIENT OUTREACH (OUTPATIENT)
Dept: CASE MANAGEMENT | Facility: OTHER | Age: 60
End: 2025-03-07
Payer: MEDICARE

## 2025-03-07 NOTE — OUTREACH NOTE
"AMBULATORY CASE MANAGEMENT NOTE    Names and Relationships of Patient/Support Persons: Contact: Zechariah Fleming \"DEENA\"; Relationship: Self -     Care Coordination    Called and spoke with [employer]. Ozempic 2 mg has not been delivered yet due to backorder of supply.    Patient Outreach    Called and spoke with patient. He is doing well. He did receive Jardiance from PCP office. He is aware that Ozempic 2 mg is on back order. States he has plenty of 1 mg. Once 2 mg is delivered I will plan to update med list and close HRCM if appropriate at that time.    Education Documentation  No documentation found.        ARELY YING  Ambulatory Case Management    3/7/2025, 15:08 EST  "

## 2025-03-21 ENCOUNTER — PATIENT OUTREACH (OUTPATIENT)
Dept: CASE MANAGEMENT | Facility: OTHER | Age: 60
End: 2025-03-21
Payer: MEDICARE

## 2025-03-24 ENCOUNTER — PATIENT OUTREACH (OUTPATIENT)
Dept: CASE MANAGEMENT | Facility: OTHER | Age: 60
End: 2025-03-24
Payer: MEDICARE

## 2025-03-24 NOTE — OUTREACH NOTE
"AMBULATORY CASE MANAGEMENT NOTE    Names and Relationships of Patient/Support Persons: Contact: Zechariah Fleming \"DEENA\"; Relationship: Self -     Patient Outreach    Called and advised patient that Ozempic 2 mg is at office ready for . He verbalized understanding and states he will pick this up tomorrow.    Education Documentation  No documentation found.        ARELY YING  Ambulatory Case Management    3/24/2025, 15:53 EDT  "

## 2025-03-25 DIAGNOSIS — R13.10 DYSPHAGIA, UNSPECIFIED TYPE: ICD-10-CM

## 2025-03-25 DIAGNOSIS — R12 CHRONIC HEARTBURN: ICD-10-CM

## 2025-03-25 RX ORDER — FAMOTIDINE 20 MG/1
TABLET, FILM COATED ORAL
Qty: 180 TABLET | Refills: 1 | OUTPATIENT
Start: 2025-03-25

## 2025-03-26 DIAGNOSIS — E78.2 MIXED HYPERLIPIDEMIA: ICD-10-CM

## 2025-04-21 ENCOUNTER — OFFICE VISIT (OUTPATIENT)
Dept: FAMILY MEDICINE CLINIC | Facility: CLINIC | Age: 60
End: 2025-04-21
Payer: MEDICARE

## 2025-04-21 VITALS
TEMPERATURE: 97.8 F | BODY MASS INDEX: 26.94 KG/M2 | WEIGHT: 172 LBS | HEART RATE: 95 BPM | DIASTOLIC BLOOD PRESSURE: 82 MMHG | SYSTOLIC BLOOD PRESSURE: 124 MMHG | OXYGEN SATURATION: 98 %

## 2025-04-21 DIAGNOSIS — F43.0 ANXIETY AS ACUTE REACTION TO EXCEPTIONAL STRESS: ICD-10-CM

## 2025-04-21 DIAGNOSIS — F41.1 ANXIETY AS ACUTE REACTION TO EXCEPTIONAL STRESS: ICD-10-CM

## 2025-04-21 DIAGNOSIS — F32.9 REACTIVE DEPRESSION (SITUATIONAL): Primary | ICD-10-CM

## 2025-04-21 PROCEDURE — 1159F MED LIST DOCD IN RCRD: CPT | Performed by: NURSE PRACTITIONER

## 2025-04-21 PROCEDURE — 1126F AMNT PAIN NOTED NONE PRSNT: CPT | Performed by: NURSE PRACTITIONER

## 2025-04-21 PROCEDURE — 3046F HEMOGLOBIN A1C LEVEL >9.0%: CPT | Performed by: NURSE PRACTITIONER

## 2025-04-21 PROCEDURE — 99214 OFFICE O/P EST MOD 30 MIN: CPT | Performed by: NURSE PRACTITIONER

## 2025-04-21 PROCEDURE — 1160F RVW MEDS BY RX/DR IN RCRD: CPT | Performed by: NURSE PRACTITIONER

## 2025-04-21 PROCEDURE — 3079F DIAST BP 80-89 MM HG: CPT | Performed by: NURSE PRACTITIONER

## 2025-04-21 PROCEDURE — 3074F SYST BP LT 130 MM HG: CPT | Performed by: NURSE PRACTITIONER

## 2025-04-21 RX ORDER — DULOXETIN HYDROCHLORIDE 20 MG/1
20 CAPSULE, DELAYED RELEASE ORAL DAILY
Qty: 30 CAPSULE | Refills: 0 | Status: SHIPPED | OUTPATIENT
Start: 2025-04-21

## 2025-04-21 RX ORDER — HYDROXYZINE HYDROCHLORIDE 10 MG/1
10 TABLET, FILM COATED ORAL 3 TIMES DAILY PRN
Qty: 90 TABLET | Refills: 0 | Status: SHIPPED | OUTPATIENT
Start: 2025-04-21

## 2025-04-21 NOTE — PROGRESS NOTES
Chief Complaint  Depression and Anxiety    History of Present Illness  Zechariah Fleming is a 59 y.o. male who presents to [facility] with a past medical history of    Past Medical History:   Diagnosis Date    Arthritis 6/9/2022    Mixed hyperlipidemia 6/9/2022    Primary hypertension 6/9/2022    Type 2 diabetes mellitus with hyperglycemia, with long-term current use of insulin 6/9/2022       History of Present Illness  The patient is a 59-year-old male who presents to the office today as a walk-in secondary to acute anxiety.    Significant distress is reported due to financial difficulties, which he attributes to his wife's actions. He expresses a desire for pharmacological intervention but is concerned about the cost of medication. No suicidal or homicidal ideation is endorsed, although feelings of sadness are acknowledged. Counseling services have been declined.    Patient reports that his wife of 41 years has filed for divorce.  She has essentially taken all of the money and he does not have any income at this time.  He is requesting medication for management of his symptoms but needs something that is free as he cannot afford to pay for a prescription.  He is very emotional and upset in the office today given the abrupt nature of her leading him.    She did threaten to divorce him in 2023 but did not leave, but this time he feels that divorce is inevitable.    PHQ-9 Depression Screening  Little interest or pleasure in doing things? Nearly every day   Feeling down, depressed, or hopeless? Nearly every day   PHQ-2 Total Score 6   Trouble falling or staying asleep, or sleeping too much? Nearly every day   Feeling tired or having little energy? Nearly every day   Poor appetite or overeating? Nearly every day   Feeling bad about yourself - or that you are a failure or have let yourself or your family down? Nearly every day   Trouble concentrating on things, such as reading the  newspaper or watching television? Nearly every day   Moving or speaking so slowly that other people could have noticed? Or the opposite - being so fidgety or restless that you have been moving around a lot more than usual? Nearly every day     Thoughts that you would be better off dead, or of hurting yourself in some way? Not at all   PHQ-9 Total Score 24   If you checked off any problems, how difficult have these problems made it for you to do your work, take care of things at home, or get along with other people? Extremely difficult       MARYAM-7  Feeling nervous, anxious or on edge: Nearly every day  Not being able to stop or control worrying: Nearly every day  Worrying too much about different things: Not at all  Trouble Relaxing: Nearly every day  Being so restless that it is hard to sit still: Nearly every day  Feeling afraid as if something awful might happen: Not at all  Becoming easily annoyed or irritable: Nearly every day  MARYAM 7 Total Score: 15  If you checked any problems, how difficult have these problems made it for you to do your work, take care of things at home, or get along with other people: Extremely difficult      Objective   Vital Signs:   Vitals:    04/21/25 0832   BP: 124/82   Pulse: 95   Temp: 97.8 °F (36.6 °C)   SpO2: 98%   Weight: 78 kg (172 lb)     Body mass index is 26.94 kg/m².    Wt Readings from Last 3 Encounters:   04/21/25 78 kg (172 lb)   01/31/25 83.9 kg (185 lb)   10/18/24 77.6 kg (171 lb)     BP Readings from Last 3 Encounters:   04/21/25 124/82   01/31/25 124/76   10/18/24 126/74       Health Maintenance   Topic Date Due    TDAP/TD VACCINES (1 - Tdap) Never done    LUNG CANCER SCREENING  01/29/2025    ANNUAL WELLNESS VISIT  07/17/2025    PROSTATE CANCER SCREENING  07/18/2025    INFLUENZA VACCINE  07/01/2025    HEMOGLOBIN A1C  07/31/2025    DIABETIC FOOT EXAM  10/18/2025    DIABETIC EYE EXAM  10/21/2025    LIPID PANEL  01/31/2026    URINE MICROALBUMIN-CREATININE RATIO (uACR)   01/31/2026    COLORECTAL CANCER SCREENING  01/06/2028    HEPATITIS C SCREENING  Completed    Pneumococcal Vaccine 50+  Completed    Hepatitis B  Discontinued    COVID-19 Vaccine  Discontinued    ZOSTER VACCINE  Discontinued       Physical Exam  Vitals reviewed.   Constitutional:       General: He is not in acute distress.     Appearance: He is well-developed and overweight.   HENT:      Head: Normocephalic and atraumatic.   Eyes:      General: No scleral icterus.        Right eye: No discharge.         Left eye: No discharge.      Extraocular Movements: Extraocular movements intact.      Conjunctiva/sclera: Conjunctivae normal.   Cardiovascular:      Rate and Rhythm: Normal rate and regular rhythm.      Pulses: Normal pulses.      Heart sounds: No murmur heard.  Pulmonary:      Effort: Pulmonary effort is normal.      Breath sounds: Normal breath sounds. No wheezing, rhonchi or rales.   Musculoskeletal:         General: Normal range of motion.      Cervical back: Normal range of motion.   Skin:     General: Skin is warm and dry.   Neurological:      Mental Status: He is alert and oriented to person, place, and time.   Psychiatric:         Mood and Affect: Mood is anxious and depressed. Affect is tearful.         Speech: Speech normal.         Behavior: Behavior normal. Behavior is cooperative.         Thought Content: Thought content normal. Thought content does not include homicidal or suicidal ideation. Thought content does not include homicidal or suicidal plan.         Judgment: Judgment normal.            Result Review :  The following data was reviewed by: BIENVENIDO Ross on 04/21/2025:    No visits with results within 1 Month(s) from this visit.   Latest known visit with results is:   Office Visit on 01/31/2025   Component Date Value    WBC 01/31/2025 13.49 (H)     RBC 01/31/2025 5.70     Hemoglobin 01/31/2025 16.8     Hematocrit 01/31/2025 49.2     MCV 01/31/2025 86.3     MCH 01/31/2025 29.5      MCHC 01/31/2025 34.1     RDW 01/31/2025 13.9     RDW-SD 01/31/2025 43.7     MPV 01/31/2025 10.7     Platelets 01/31/2025 300     Neutrophil % 01/31/2025 49.2     Lymphocyte % 01/31/2025 36.2     Monocyte % 01/31/2025 7.5     Eosinophil % 01/31/2025 6.1     Basophil % 01/31/2025 0.7     Immature Grans % 01/31/2025 0.3     Neutrophils, Absolute 01/31/2025 6.63     Lymphocytes, Absolute 01/31/2025 4.89 (H)     Monocytes, Absolute 01/31/2025 1.01 (H)     Eosinophils, Absolute 01/31/2025 0.82 (H)     Basophils, Absolute 01/31/2025 0.10     Immature Grans, Absolute 01/31/2025 0.04     nRBC 01/31/2025 0.0     Glucose 01/31/2025 89     BUN 01/31/2025 14     Creatinine 01/31/2025 0.83     Sodium 01/31/2025 136     Potassium 01/31/2025 4.3     Chloride 01/31/2025 101     CO2 01/31/2025 26.9     Calcium 01/31/2025 9.7     Total Protein 01/31/2025 7.9     Albumin 01/31/2025 4.3     ALT (SGPT) 01/31/2025 12     AST (SGOT) 01/31/2025 17     Alkaline Phosphatase 01/31/2025 83     Total Bilirubin 01/31/2025 0.4     Globulin 01/31/2025 3.6     A/G Ratio 01/31/2025 1.2     BUN/Creatinine Ratio 01/31/2025 16.9     Anion Gap 01/31/2025 8.1     eGFR 01/31/2025 100.8     Hemoglobin A1C 01/31/2025 9.80 (H)     Total Cholesterol 01/31/2025 120     Triglycerides 01/31/2025 106     HDL Cholesterol 01/31/2025 54     LDL Cholesterol  01/31/2025 47     VLDL Cholesterol 01/31/2025 19     LDL/HDL Ratio 01/31/2025 0.83     TSH 01/31/2025 3.020     Free T4 01/31/2025 0.95     Microalbumin/Creatinine * 01/31/2025 59.7 (H)     Creatinine, Urine 01/31/2025 211.1     Microalbumin, Urine 01/31/2025 12.6     Folate 01/31/2025 13.30     Vitamin B-12 01/31/2025 488          Procedures        Assessment and Plan   Diagnoses and all orders for this visit:    1. Reactive depression (situational) (Primary)  -     DULoxetine (CYMBALTA) 20 MG capsule; Take 1 capsule by mouth Daily.  Dispense: 30 capsule; Refill: 0  -     hydrOXYzine (ATARAX) 10 MG tablet; Take 1  tablet by mouth 3 (Three) Times a Day As Needed for Anxiety.  Dispense: 90 tablet; Refill: 0    2. Anxiety as acute reaction to exceptional stress  -     hydrOXYzine (ATARAX) 10 MG tablet; Take 1 tablet by mouth 3 (Three) Times a Day As Needed for Anxiety.  Dispense: 90 tablet; Refill: 0        Assessment & Plan  1. Acute anxiety.  - Reports significant distress due to financial issues and marital problems.  - Does not endorse any suicidal or homicidal ideation but acknowledges feelings of sadness.  - A prescription for Cymbalta (duloxetine) has been provided, which he is advised to take once daily. Additionally, a separate medication has been prescribed, hydroxyzine, which can be taken up to three times daily as needed for severe anxiety. The potential side effects of these medications have been discussed, and he has been informed that Cymbalta has a low side effect profile.                FOLLOW UP  Return in about 24 days (around 5/15/2025) for Next scheduled follow up.    Patient was given instructions and counseling regarding his condition or for health maintenance advice. Please see specific information pulled into the AVS if appropriate.       Brittni Birmingham, APRN  04/21/25  09:32 EDT    CURRENT & DISCONTINUED MEDICATIONS  Current Outpatient Medications   Medication Instructions    aspirin 81 mg, Oral, Daily    atenolol (TENORMIN) 25 mg, Oral, Daily    DULoxetine (CYMBALTA) 20 mg, Oral, Daily    empagliflozin (JARDIANCE) 25 mg, Oral, Daily    ezetimibe (ZETIA) 10 mg, Oral, Daily    Fluticasone Furoate-Vilanterol (Breo Ellipta) 100-25 MCG/ACT aerosol powder  1 puff, Inhalation, Daily - RT    glucose blood (FREESTYLE LITE) test strip 1 each, Other, Daily, To check fasting    hydrOXYzine (ATARAX) 10 mg, Oral, 3 Times Daily PRN    lisinopril (PRINIVIL,ZESTRIL) 5 mg, Oral, Daily    pravastatin (PRAVACHOL) 80 mg, Oral, Daily    Semaglutide (1 MG/DOSE) (OZEMPIC) 1 mg, Subcutaneous, Weekly    Tresiba FlexTouch  60 Units, Subcutaneous, Daily    vitamin B-12 (CYANOCOBALAMIN) 1,000 mcg, Oral, Daily       There are no discontinued medications.     Patient or patient representative verbalized consent for the use of Ambient Listening during the visit with  BIENVENIDO Ross for chart documentation. 4/21/2025  09:15 EDT

## 2025-05-15 ENCOUNTER — OFFICE VISIT (OUTPATIENT)
Dept: FAMILY MEDICINE CLINIC | Facility: CLINIC | Age: 60
End: 2025-05-15
Payer: MEDICARE

## 2025-05-15 VITALS
DIASTOLIC BLOOD PRESSURE: 72 MMHG | SYSTOLIC BLOOD PRESSURE: 112 MMHG | WEIGHT: 171.6 LBS | HEART RATE: 100 BPM | BODY MASS INDEX: 26.88 KG/M2 | TEMPERATURE: 97.8 F | OXYGEN SATURATION: 99 %

## 2025-05-15 DIAGNOSIS — F32.9 REACTIVE DEPRESSION (SITUATIONAL): ICD-10-CM

## 2025-05-15 PROCEDURE — 3046F HEMOGLOBIN A1C LEVEL >9.0%: CPT | Performed by: NURSE PRACTITIONER

## 2025-05-15 PROCEDURE — 1160F RVW MEDS BY RX/DR IN RCRD: CPT | Performed by: NURSE PRACTITIONER

## 2025-05-15 PROCEDURE — 99213 OFFICE O/P EST LOW 20 MIN: CPT | Performed by: NURSE PRACTITIONER

## 2025-05-15 PROCEDURE — 3078F DIAST BP <80 MM HG: CPT | Performed by: NURSE PRACTITIONER

## 2025-05-15 PROCEDURE — 3074F SYST BP LT 130 MM HG: CPT | Performed by: NURSE PRACTITIONER

## 2025-05-15 PROCEDURE — 1159F MED LIST DOCD IN RCRD: CPT | Performed by: NURSE PRACTITIONER

## 2025-05-15 PROCEDURE — 1126F AMNT PAIN NOTED NONE PRSNT: CPT | Performed by: NURSE PRACTITIONER

## 2025-05-15 NOTE — PROGRESS NOTES
Chief Complaint  Depression    History of Present Illness  Zechariah Fleming is a 59 y.o. male who presents to [facility] with a past medical history of    Past Medical History:   Diagnosis Date    Arthritis 6/9/2022    Mixed hyperlipidemia 6/9/2022    Primary hypertension 6/9/2022    Type 2 diabetes mellitus with hyperglycemia, with long-term current use of insulin 6/9/2022       History of Present Illness  The patient is a 59-year-old male who presents to the office today for follow-up on medication management, recently started on an antidepressant due to mood changes secondary to impending divorce.    He reports an improvement in his condition, attributing it to the efficacy of the prescribed medication. Despite this, he expresses a desire to discontinue the medication upon completion of the current course, believing he will be able to manage without it. He acknowledges experiencing occasional tearfulness but maintains that his overall emotional state has improved compared to 3 to 4 weeks ago.     Currently, he is  from his wife and is in the process of settling their financial matters. He mentions that his wife had previously cleaned out their accounts, but he is now focused on ensuring she leaves him alone. He also reports not adhering to his B12 supplement regimen, citing insurance coverage issues.    He denies any homicidal ideations or suicidal ideations.  Denies any AVH.      Objective   Vital Signs:   Vitals:    05/15/25 0820   BP: 112/72   BP Location: Right arm   Patient Position: Sitting   Cuff Size: Adult   Pulse: 100   Temp: 97.8 °F (36.6 °C)   TempSrc: Infrared   SpO2: 99%   Weight: 77.8 kg (171 lb 9.6 oz)     Body mass index is 26.88 kg/m².    Wt Readings from Last 3 Encounters:   05/15/25 77.8 kg (171 lb 9.6 oz)   04/21/25 78 kg (172 lb)   01/31/25 83.9 kg (185 lb)     BP Readings from Last 3 Encounters:   05/15/25 112/72   04/21/25 124/82   01/31/25 124/76        Health Maintenance   Topic Date Due    TDAP/TD VACCINES (1 - Tdap) Never done    LUNG CANCER SCREENING  01/29/2025    ANNUAL WELLNESS VISIT  07/17/2025    PROSTATE CANCER SCREENING  07/18/2025    HEMOGLOBIN A1C  07/31/2025    INFLUENZA VACCINE  07/01/2025    DIABETIC FOOT EXAM  10/18/2025    DIABETIC EYE EXAM  10/21/2025    LIPID PANEL  01/31/2026    URINE MICROALBUMIN-CREATININE RATIO (uACR)  01/31/2026    COLORECTAL CANCER SCREENING  01/06/2028    HEPATITIS C SCREENING  Completed    Pneumococcal Vaccine 50+  Completed    Hepatitis B  Discontinued    COVID-19 Vaccine  Discontinued    ZOSTER VACCINE  Discontinued       Physical Exam  Vitals reviewed.   Constitutional:       General: He is not in acute distress.     Appearance: Normal appearance. He is well-developed. He is not ill-appearing.   HENT:      Head: Normocephalic and atraumatic.   Eyes:      General: No scleral icterus.        Right eye: No discharge.         Left eye: No discharge.      Extraocular Movements: Extraocular movements intact.      Conjunctiva/sclera: Conjunctivae normal.   Cardiovascular:      Rate and Rhythm: Normal rate and regular rhythm.      Pulses: Normal pulses.      Heart sounds: No murmur heard.  Pulmonary:      Effort: Pulmonary effort is normal.      Breath sounds: Normal breath sounds. No wheezing, rhonchi or rales.   Musculoskeletal:         General: Normal range of motion.      Cervical back: Normal range of motion.      Right lower leg: No edema.      Left lower leg: No edema.   Skin:     General: Skin is warm and dry.   Neurological:      Mental Status: He is alert and oriented to person, place, and time.   Psychiatric:         Mood and Affect: Mood and affect normal.         Behavior: Behavior normal.         Thought Content: Thought content normal.         Judgment: Judgment normal.          Result Review :  The following data was reviewed by: Brittni Birmingham, BIENVENIDO on 05/15/2025:    No visits with results within 1  Month(s) from this visit.   Latest known visit with results is:   Office Visit on 01/31/2025   Component Date Value    WBC 01/31/2025 13.49 (H)     RBC 01/31/2025 5.70     Hemoglobin 01/31/2025 16.8     Hematocrit 01/31/2025 49.2     MCV 01/31/2025 86.3     MCH 01/31/2025 29.5     MCHC 01/31/2025 34.1     RDW 01/31/2025 13.9     RDW-SD 01/31/2025 43.7     MPV 01/31/2025 10.7     Platelets 01/31/2025 300     Neutrophil % 01/31/2025 49.2     Lymphocyte % 01/31/2025 36.2     Monocyte % 01/31/2025 7.5     Eosinophil % 01/31/2025 6.1     Basophil % 01/31/2025 0.7     Immature Grans % 01/31/2025 0.3     Neutrophils, Absolute 01/31/2025 6.63     Lymphocytes, Absolute 01/31/2025 4.89 (H)     Monocytes, Absolute 01/31/2025 1.01 (H)     Eosinophils, Absolute 01/31/2025 0.82 (H)     Basophils, Absolute 01/31/2025 0.10     Immature Grans, Absolute 01/31/2025 0.04     nRBC 01/31/2025 0.0     Glucose 01/31/2025 89     BUN 01/31/2025 14     Creatinine 01/31/2025 0.83     Sodium 01/31/2025 136     Potassium 01/31/2025 4.3     Chloride 01/31/2025 101     CO2 01/31/2025 26.9     Calcium 01/31/2025 9.7     Total Protein 01/31/2025 7.9     Albumin 01/31/2025 4.3     ALT (SGPT) 01/31/2025 12     AST (SGOT) 01/31/2025 17     Alkaline Phosphatase 01/31/2025 83     Total Bilirubin 01/31/2025 0.4     Globulin 01/31/2025 3.6     A/G Ratio 01/31/2025 1.2     BUN/Creatinine Ratio 01/31/2025 16.9     Anion Gap 01/31/2025 8.1     eGFR 01/31/2025 100.8     Hemoglobin A1C 01/31/2025 9.80 (H)     Total Cholesterol 01/31/2025 120     Triglycerides 01/31/2025 106     HDL Cholesterol 01/31/2025 54     LDL Cholesterol  01/31/2025 47     VLDL Cholesterol 01/31/2025 19     LDL/HDL Ratio 01/31/2025 0.83     TSH 01/31/2025 3.020     Free T4 01/31/2025 0.95     Microalbumin/Creatinine * 01/31/2025 59.7 (H)     Creatinine, Urine 01/31/2025 211.1     Microalbumin, Urine 01/31/2025 12.6     Folate 01/31/2025 13.30     Vitamin B-12 01/31/2025 488           Procedures        Assessment and Plan   Diagnoses and all orders for this visit:    1. Reactive depression (situational)        Assessment & Plan  1. Mood changes secondary to impending divorce.  - Reports feeling better and does not want a refill of the antidepressant medication.  - States he will be okay without further medication.  - Discussed the importance of maintaining the scheduled appointment on 07/25/2025.  - Instructed to contact the office immediately if there are any changes in his condition prior to the next appointment.    2. B12 supplementation.  - Not currently taking the B12 supplement.  - Insurance does not cover the cost of the supplement.  - Discussed the importance of taking the supplement and potential alternatives for obtaining it.                  FOLLOW UP  Return in about 2 months (around 7/25/2025) for Medicare Wellness, medication refills and fasting labs.    Patient was given instructions and counseling regarding his condition or for health maintenance advice. Please see specific information pulled into the AVS if appropriate.       Brittni Birmingham, APRN  05/15/25  08:35 EDT    CURRENT & DISCONTINUED MEDICATIONS  Current Outpatient Medications   Medication Instructions    aspirin 81 mg, Oral, Daily    atenolol (TENORMIN) 25 mg, Oral, Daily    DULoxetine (CYMBALTA) 20 mg, Oral, Daily    empagliflozin (JARDIANCE) 25 mg, Oral, Daily    ezetimibe (ZETIA) 10 mg, Oral, Daily    Fluticasone Furoate-Vilanterol (Breo Ellipta) 100-25 MCG/ACT aerosol powder  1 puff, Inhalation, Daily - RT    glucose blood (FREESTYLE LITE) test strip 1 each, Other, Daily, To check fasting    hydrOXYzine (ATARAX) 10 mg, Oral, 3 Times Daily PRN    lisinopril (PRINIVIL,ZESTRIL) 5 mg, Oral, Daily    pravastatin (PRAVACHOL) 80 mg, Oral, Daily    Semaglutide (1 MG/DOSE) (OZEMPIC) 1 mg, Subcutaneous, Weekly    Tresiba FlexTouch 60 Units, Subcutaneous, Daily       Medications Discontinued During This Encounter    Medication Reason    vitamin B-12 (CYANOCOBALAMIN) 1000 MCG tablet Cost of medication        Patient or patient representative verbalized consent for the use of Ambient Listening during the visit with  BIENVENIDO Ross for chart documentation. 5/15/2025  08:35 EDT

## 2025-06-16 ENCOUNTER — PATIENT OUTREACH (OUTPATIENT)
Dept: CASE MANAGEMENT | Facility: OTHER | Age: 60
End: 2025-06-16
Payer: MEDICARE

## 2025-06-16 NOTE — OUTREACH NOTE
AMBULATORY CASE MANAGEMENT NOTE    Names and Relationships of Patient/Support Persons: Contact: [employer]; Relationship:  -     Care Coordination    Received fax from [employer] stating that patient refill of Ozempic will be delivered to the office in 10-14 business days. Placed in to [facility].    Education Documentation  No documentation found.        ARELY YING  Ambulatory Case Management    6/16/2025, 14:05 EDT

## 2025-07-25 ENCOUNTER — OFFICE VISIT (OUTPATIENT)
Dept: FAMILY MEDICINE CLINIC | Facility: CLINIC | Age: 60
End: 2025-07-25
Payer: MEDICARE

## 2025-07-25 VITALS
OXYGEN SATURATION: 97 % | WEIGHT: 171 LBS | BODY MASS INDEX: 26.84 KG/M2 | HEIGHT: 67 IN | DIASTOLIC BLOOD PRESSURE: 68 MMHG | SYSTOLIC BLOOD PRESSURE: 106 MMHG | TEMPERATURE: 98.6 F | HEART RATE: 92 BPM

## 2025-07-25 DIAGNOSIS — Z00.00 MEDICARE ANNUAL WELLNESS VISIT, SUBSEQUENT: Primary | ICD-10-CM

## 2025-07-25 DIAGNOSIS — I25.10 CORONARY ARTERY CALCIFICATION SEEN ON CT SCAN: ICD-10-CM

## 2025-07-25 DIAGNOSIS — E78.2 MIXED HYPERLIPIDEMIA: ICD-10-CM

## 2025-07-25 DIAGNOSIS — Z12.2 SCREENING FOR LUNG CANCER: ICD-10-CM

## 2025-07-25 DIAGNOSIS — Z79.4 TYPE 2 DIABETES MELLITUS WITH HYPERGLYCEMIA, WITH LONG-TERM CURRENT USE OF INSULIN: ICD-10-CM

## 2025-07-25 DIAGNOSIS — J42 CHRONIC BRONCHITIS, UNSPECIFIED CHRONIC BRONCHITIS TYPE: ICD-10-CM

## 2025-07-25 DIAGNOSIS — Z87.891 PERSONAL HISTORY OF TOBACCO USE, PRESENTING HAZARDS TO HEALTH: ICD-10-CM

## 2025-07-25 DIAGNOSIS — Z23 NEED FOR TDAP VACCINATION: ICD-10-CM

## 2025-07-25 DIAGNOSIS — F17.218 CIGARETTE NICOTINE DEPENDENCE WITH OTHER NICOTINE-INDUCED DISORDER: ICD-10-CM

## 2025-07-25 DIAGNOSIS — I10 PRIMARY HYPERTENSION: ICD-10-CM

## 2025-07-25 DIAGNOSIS — E11.65 TYPE 2 DIABETES MELLITUS WITH HYPERGLYCEMIA, WITH LONG-TERM CURRENT USE OF INSULIN: ICD-10-CM

## 2025-07-25 DIAGNOSIS — Z12.5 PROSTATE CANCER SCREENING: ICD-10-CM

## 2025-07-25 RX ORDER — FLUTICASONE FUROATE AND VILANTEROL 100; 25 UG/1; UG/1
1 POWDER RESPIRATORY (INHALATION)
Qty: 180 EACH | Refills: 3
Start: 2025-07-25

## 2025-07-25 RX ORDER — INSULIN DEGLUDEC 200 U/ML
65 INJECTION, SOLUTION SUBCUTANEOUS DAILY
Qty: 30 ML | Refills: 1 | Status: SHIPPED | OUTPATIENT
Start: 2025-07-25

## 2025-07-25 RX ORDER — PRAVASTATIN SODIUM 80 MG/1
80 TABLET ORAL DAILY
Qty: 90 TABLET | Refills: 1 | Status: SHIPPED | OUTPATIENT
Start: 2025-07-25

## 2025-07-25 RX ORDER — ATENOLOL 25 MG/1
25 TABLET ORAL DAILY
Qty: 90 TABLET | Refills: 1 | Status: SHIPPED | OUTPATIENT
Start: 2025-07-25

## 2025-07-25 RX ORDER — EZETIMIBE 10 MG/1
10 TABLET ORAL DAILY
Qty: 90 TABLET | Refills: 1 | OUTPATIENT
Start: 2025-07-25

## 2025-07-25 RX ORDER — LISINOPRIL 5 MG/1
5 TABLET ORAL DAILY
Qty: 90 TABLET | Refills: 1 | Status: SHIPPED | OUTPATIENT
Start: 2025-07-25

## 2025-07-25 RX ORDER — EZETIMIBE 10 MG/1
10 TABLET ORAL DAILY
Qty: 90 TABLET | Refills: 1 | Status: SHIPPED | OUTPATIENT
Start: 2025-07-25

## 2025-07-25 NOTE — PROGRESS NOTES
Subjective   The ABCs of the Annual Wellness Visit  Medicare Wellness Visit      Zechariah Fleming is a 59 y.o. patient who presents for a Medicare Wellness Visit.    The following portions of the patient's history were reviewed and updated as appropriate: allergies, current medications, past family history, past medical history, past social history, past surgical history, and problem list.    Compared to one year ago, the patient's physical health is the same.    Compared to one year ago, the patient's mental health is the same.    Recent Hospitalizations:  He was not admitted to the hospital during the last year.     Current Medical Providers:  Patient Care Team:  Brittni Birmingham APRN as PCP - General (Nurse Practitioner)    Outpatient Medications Prior to Visit   Medication Sig Dispense Refill    glucose blood (FREESTYLE LITE) test strip 1 each by Other route Daily. To check fasting 100 each 1    atenolol (TENORMIN) 25 MG tablet Take 1 tablet by mouth Daily. 90 tablet 1    DULoxetine (CYMBALTA) 20 MG capsule Take 1 capsule by mouth Daily. 30 capsule 0    empagliflozin (Jardiance) 25 MG tablet tablet Take 1 tablet by mouth Daily. 90 tablet 1    ezetimibe (Zetia) 10 MG tablet Take 1 tablet by mouth Daily. 90 tablet 1    Fluticasone Furoate-Vilanterol (Breo Ellipta) 100-25 MCG/ACT aerosol powder  Inhale 1 puff Daily. 180 each 1    hydrOXYzine (ATARAX) 10 MG tablet Take 1 tablet by mouth 3 (Three) Times a Day As Needed for Anxiety. 90 tablet 0    Insulin Degludec (Tresiba FlexTouch) 200 UNIT/ML solution pen-injector pen injection Inject 60 Units under the skin into the appropriate area as directed Daily. 30 mL 1    lisinopril (PRINIVIL,ZESTRIL) 5 MG tablet Take 1 tablet by mouth Daily. 90 tablet 1    pravastatin (PRAVACHOL) 80 MG tablet Take 1 tablet by mouth Daily. 90 tablet 1    Semaglutide, 1 MG/DOSE, (OZEMPIC) 4 MG/3ML solution pen-injector Inject 1 mg under the skin into the appropriate area as directed 1  "(One) Time Per Week.      aspirin 81 MG EC tablet Take 1 tablet by mouth Daily. 90 tablet 3     No facility-administered medications prior to visit.     No opioid medication identified on active medication list. I have reviewed chart for other potential  high risk medication/s and harmful drug interactions in the elderly.      Aspirin is on active medication list. Aspirin use is indicated based on review of current medical condition/s. Pros and cons of this therapy have been discussed today. Benefits of this medication outweigh potential harm.  Patient has been encouraged to continue taking this medication.        Patient Active Problem List   Diagnosis    Type 2 diabetes mellitus with hyperglycemia, with long-term current use of insulin    Primary hypertension    Mixed hyperlipidemia    Arthritis    Coronary artery calcification seen on CT scan     Advance Care Planning Advance Directive is not on file.  ACP discussion was held with the patient during this visit. Patient does not have an advance directive, declines further assistance.            Objective   Vitals:    07/25/25 0830   BP: 106/68   Pulse: 92   Temp: 98.6 °F (37 °C)   SpO2: 97%   Weight: 77.6 kg (171 lb)   Height: 170.2 cm (67\")   PainSc: 5    PainLoc: Shoulder  Comment: bilateral shoulders       Estimated body mass index is 26.78 kg/m² as calculated from the following:    Height as of this encounter: 170.2 cm (67\").    Weight as of this encounter: 77.6 kg (171 lb).                Does the patient have evidence of cognitive impairment? No                                                                                                Health  Risk Assessment    Smoking Status:  Social History     Tobacco Use   Smoking Status Every Day    Current packs/day: 1.50    Average packs/day: 1.5 packs/day for 44.6 years (66.8 ttl pk-yrs)    Types: Cigarettes    Start date: 1981   Smokeless Tobacco Never     Alcohol Consumption:  Social History     Substance and " Sexual Activity   Alcohol Use Never       Fall Risk Screen  STEADI Fall Risk Assessment was completed, and patient is at LOW risk for falls.Assessment completed on:2025    Depression Screening   Little interest or pleasure in doing things? Nearly every day   Feeling down, depressed, or hopeless? Nearly every day   PHQ-2 Total Score 6   Trouble falling or staying asleep, or sleeping too much? Nearly every day   Feeling tired or having little energy? Nearly every day   Poor appetite or overeating? Nearly every day   Feeling bad about yourself - or that you are a failure or have let yourself or your family down? Nearly every day   Trouble concentrating on things, such as reading the newspaper or watching television? Not at all   Moving or speaking so slowly that other people could have noticed? Or the opposite - being so fidgety or restless that you have been moving around a lot more than usual? Nearly every day     Thoughts that you would be better off dead, or of hurting yourself in some way? (S) Not at all (refused to answer)   PHQ-9 Total Score 21   If you checked off any problems, how difficult have these problems made it for you to do your work, take care of things at home, or get along with other people? Extremely difficult        Health Habits and Functional and Cognitive Screenin/25/2025     8:32 AM   Functional & Cognitive Status   Do you have difficulty preparing food and eating? No   Do you have difficulty bathing yourself, getting dressed or grooming yourself? No   Do you have difficulty using the toilet? No   Do you have difficulty moving around from place to place? No   Do you have trouble with steps or getting out of a bed or a chair? Yes   Current Diet Unhealthy Diet   Dental Exam Not up to date   Eye Exam Not up to date   Exercise (times per week) 0 times per week   Current Exercises Include No Regular Exercise   Do you need help using the phone?  No   Are you deaf or do you have serious  difficulty hearing?  No   Do you need help to go to places out of walking distance? No   Do you need help shopping? No   Do you need help preparing meals?  No   Do you need help with housework?  No   Do you need help with laundry? No   Do you need help taking your medications? No   Do you need help managing money? No   Do you ever drive or ride in a car without wearing a seat belt? Yes   Have you felt unusual fatigue (could be tiredness), stress, anger or loneliness in the last month? Yes   Who do you live with? Alone   If you need help, do you have trouble finding someone available to you? No   Have you been bothered in the last four weeks by sexual problems? No   Do you have difficulty concentrating, remembering or making decisions? No           Age-appropriate Screening Schedule:  Refer to the list below for future screening recommendations based on patient's age, sex and/or medical conditions. Orders for these recommended tests are listed in the plan section. The patient has been provided with a written plan.    Health Maintenance List  Health Maintenance   Topic Date Due    TDAP/TD VACCINES (1 - Tdap) Never done    LUNG CANCER SCREENING  01/29/2025    PROSTATE CANCER SCREENING  07/18/2025    HEMOGLOBIN A1C  07/31/2025    INFLUENZA VACCINE  10/01/2025    DIABETIC FOOT EXAM  10/18/2025    DIABETIC EYE EXAM  10/21/2025    LIPID PANEL  01/31/2026    URINE MICROALBUMIN-CREATININE RATIO (uACR)  01/31/2026    ANNUAL WELLNESS VISIT  07/25/2026    COLORECTAL CANCER SCREENING  01/06/2028    HEPATITIS C SCREENING  Completed    Pneumococcal Vaccine 50+  Completed    Hepatitis B  Discontinued    COVID-19 Vaccine  Discontinued    ZOSTER VACCINE  Discontinued                                                                                                                                                CMS Preventative Services Quick Reference  Risk Factors Identified During Encounter    Depression/Dysphoria: Due to recent  divorce. Patient declines treatment.  Immunizations Discussed/Encouraged: Tdap  Tobacco Use/Dependance Risk   Zechariah Fleming  reports that he has been smoking cigarettes. He started smoking about 44 years ago. He has a 66.8 pack-year smoking history. He has never used smokeless tobacco. I have educated him on the risk of diseases from using tobacco products such as cancer, COPD, heart disease, cataracts, and arterial disease.     I advised him to quit and he is not willing to quit. I spent 3  minutes counseling the patient.    Dental Screening Recommended    Vision Screening Recommended    The above risks/problems have been discussed with the patient.    Pertinent information has been shared with the patient in the After Visit Summary.    An After Visit Summary and PPPS were made available to the patient.    Follow Up:     Next Medicare Wellness visit to be scheduled in 1 year.          Additional E&M Note during same encounter follows:  Patient has multiple medical problems which are significant and separately identifiable that require additional work above and beyond the Medicare Wellness Visit.      Chief Complaint  Medicare Wellness-subsequent    Zechariah Fleming is a 59 y.o. male who presents to [facility]     History of Present Illness  The patient is a 59-year-old male who presents to the office today for a Medicare annual wellness visit and follow-up on chronic health conditions.    He reports an issue with his left ear, suspecting some growth or abnormality; he just feels as if something is in there.     He has not been using the Breo inhaler as it is not covered by his insurance policy. He is due for a lung cancer screening and is willing to undergo the procedure.     He is unable to provide a blood sample today due to consuming Mountain Dew this morning but plans to return on Monday for a fasting blood test.     He mentions that his insurance does not cover aspirin, but  "he has a card that provides him with $75 monthly, which he uses to purchase over-the-counter medications.     He is due for an eye exam in October 2025.     He is no longer taking hydroxyzine 10 mg up to three times daily for anxiety. He is also not taking duloxetine.  Despite his PHQ-9 score being 21 he feels he does not need medication for anxiety and depression.  He denies being any harm to himself or to others.  He has been trying to process his divorce from his wife.  He turned everything over to her.  He did not even show up to court for the probate.    He is also on Ozempic once weekly and injecting Tresiba 65 units daily for diabetes management. He does not require additional test strips, as he does not monitor his blood sugar levels regularly.  He denies polyuria, polydipsia, or polyphagia.  He is currently getting Ozempic through patient assistance.    He continues to take Zetia and pravastatin for cholesterol management. For blood pressure control, he is on atenolol and lisinopril.     Marital Status:   Hobbies: Gardening  Coffee/Tea/Caffeine-containing Drinks: Drinks Mountain Dew  Living Condition: Lives alone    Objective   Vital Signs:   Vitals:    07/25/25 0830   BP: 106/68   Pulse: 92   Temp: 98.6 °F (37 °C)   SpO2: 97%   Weight: 77.6 kg (171 lb)   Height: 170.2 cm (67\")   PainSc: 5    PainLoc: Shoulder  Comment: bilateral shoulders       Wt Readings from Last 3 Encounters:   07/25/25 77.6 kg (171 lb)   05/15/25 77.8 kg (171 lb 9.6 oz)   04/21/25 78 kg (172 lb)     BP Readings from Last 3 Encounters:   07/25/25 106/68   05/15/25 112/72   04/21/25 124/82       Physical Exam  Vitals reviewed.   Constitutional:       General: He is not in acute distress.     Appearance: He is well-developed and overweight. He is not ill-appearing.   HENT:      Head: Normocephalic and atraumatic.      Right Ear: Tympanic membrane, ear canal and external ear normal. There is no impacted cerumen.      Left Ear: " Tympanic membrane, ear canal and external ear normal. There is no impacted cerumen.   Eyes:      General: No scleral icterus.        Right eye: No discharge.         Left eye: No discharge.      Extraocular Movements: Extraocular movements intact.      Conjunctiva/sclera: Conjunctivae normal.      Pupils: Pupils are equal, round, and reactive to light.   Neck:      Thyroid: No thyromegaly.      Vascular: No carotid bruit.      Trachea: Trachea normal.   Cardiovascular:      Rate and Rhythm: Normal rate and regular rhythm.      Pulses: Normal pulses.      Heart sounds: No murmur heard.  Pulmonary:      Effort: Pulmonary effort is normal.      Breath sounds: Normal breath sounds. No wheezing, rhonchi or rales.   Musculoskeletal:         General: Normal range of motion.      Cervical back: Normal range of motion and neck supple. No tenderness.      Right lower leg: No edema.      Left lower leg: No edema.   Lymphadenopathy:      Cervical: No cervical adenopathy.   Skin:     General: Skin is warm and dry.   Neurological:      Mental Status: He is alert and oriented to person, place, and time.   Psychiatric:         Attention and Perception: Attention and perception normal.         Mood and Affect: Affect normal. Mood is depressed. Mood is not anxious.         Speech: Speech normal.         Behavior: Behavior normal. Behavior is cooperative.         Thought Content: Thought content normal. Thought content does not include homicidal or suicidal ideation. Thought content does not include homicidal or suicidal plan.         Cognition and Memory: Cognition and memory normal.         Judgment: Judgment normal.         The following data was reviewed by BIENVENIDO Ross on 07/25/2025:    No visits with results within 1 Month(s) from this visit.   Latest known visit with results is:   Office Visit on 01/31/2025   Component Date Value    WBC 01/31/2025 13.49 (H)     RBC 01/31/2025 5.70     Hemoglobin 01/31/2025 16.8      Hematocrit 01/31/2025 49.2     MCV 01/31/2025 86.3     MCH 01/31/2025 29.5     MCHC 01/31/2025 34.1     RDW 01/31/2025 13.9     RDW-SD 01/31/2025 43.7     MPV 01/31/2025 10.7     Platelets 01/31/2025 300     Neutrophil % 01/31/2025 49.2     Lymphocyte % 01/31/2025 36.2     Monocyte % 01/31/2025 7.5     Eosinophil % 01/31/2025 6.1     Basophil % 01/31/2025 0.7     Immature Grans % 01/31/2025 0.3     Neutrophils, Absolute 01/31/2025 6.63     Lymphocytes, Absolute 01/31/2025 4.89 (H)     Monocytes, Absolute 01/31/2025 1.01 (H)     Eosinophils, Absolute 01/31/2025 0.82 (H)     Basophils, Absolute 01/31/2025 0.10     Immature Grans, Absolute 01/31/2025 0.04     nRBC 01/31/2025 0.0     Glucose 01/31/2025 89     BUN 01/31/2025 14     Creatinine 01/31/2025 0.83     Sodium 01/31/2025 136     Potassium 01/31/2025 4.3     Chloride 01/31/2025 101     CO2 01/31/2025 26.9     Calcium 01/31/2025 9.7     Total Protein 01/31/2025 7.9     Albumin 01/31/2025 4.3     ALT (SGPT) 01/31/2025 12     AST (SGOT) 01/31/2025 17     Alkaline Phosphatase 01/31/2025 83     Total Bilirubin 01/31/2025 0.4     Globulin 01/31/2025 3.6     A/G Ratio 01/31/2025 1.2     BUN/Creatinine Ratio 01/31/2025 16.9     Anion Gap 01/31/2025 8.1     eGFR 01/31/2025 100.8     Hemoglobin A1C 01/31/2025 9.80 (H)     Total Cholesterol 01/31/2025 120     Triglycerides 01/31/2025 106     HDL Cholesterol 01/31/2025 54     LDL Cholesterol  01/31/2025 47     VLDL Cholesterol 01/31/2025 19     LDL/HDL Ratio 01/31/2025 0.83     TSH 01/31/2025 3.020     Free T4 01/31/2025 0.95     Microalbumin/Creatinine * 01/31/2025 59.7 (H)     Creatinine, Urine 01/31/2025 211.1     Microalbumin, Urine 01/31/2025 12.6     Folate 01/31/2025 13.30     Vitamin B-12 01/31/2025 488      CT Chest Low Dose Cancer Screening WO (01/29/2024 10:21)       Assessment & Plan   Diagnoses and all orders for this visit:    1. Medicare annual wellness visit, subsequent (Primary)    2. Need for Tdap  vaccination  -     Tdap (ADACEL) 5-2-15.5 LF-MCG/0.5 injection; Inject 0.5 mL into the appropriate muscle as directed by prescriber 1 (One) Time for 1 dose.  Dispense: 0.5 mL; Refill: 0    3. Prostate cancer screening  -     PSA Screen    4. Screening for lung cancer  -      CT Chest Low Dose Cancer Screening WO; Future    5. Cigarette nicotine dependence with other nicotine-induced disorder  -      CT Chest Low Dose Cancer Screening WO; Future    6. Personal history of tobacco use, presenting hazards to health  -      CT Chest Low Dose Cancer Screening WO; Future    7. Type 2 diabetes mellitus with hyperglycemia, with long-term current use of insulin  -     empagliflozin (Jardiance) 25 MG tablet tablet; Take 1 tablet by mouth Daily.  Dispense: 90 tablet; Refill: 1  -     Insulin Degludec (Tresiba FlexTouch) 200 UNIT/ML solution pen-injector pen injection; Inject 65 Units under the skin into the appropriate area as directed Daily.  Dispense: 30 mL; Refill: 1  -     Semaglutide, 1 MG/DOSE, (OZEMPIC) 4 MG/3ML solution pen-injector; Inject 1 mg under the skin into the appropriate area as directed 1 (One) Time Per Week.  -     CBC Auto Differential  -     Comprehensive Metabolic Panel  -     Hemoglobin A1c  -     Lipid Panel  -     TSH+Free T4  -     Microalbumin / Creatinine Urine Ratio - Urine, Clean Catch    8. Primary hypertension  -     atenolol (TENORMIN) 25 MG tablet; Take 1 tablet by mouth Daily.  Dispense: 90 tablet; Refill: 1  -     lisinopril (PRINIVIL,ZESTRIL) 5 MG tablet; Take 1 tablet by mouth Daily.  Dispense: 90 tablet; Refill: 1  -     CBC Auto Differential  -     Comprehensive Metabolic Panel  -     Lipid Panel  -     TSH+Free T4  -     Microalbumin / Creatinine Urine Ratio - Urine, Clean Catch    9. Mixed hyperlipidemia  -     ezetimibe (Zetia) 10 MG tablet; Take 1 tablet by mouth Daily.  Dispense: 90 tablet; Refill: 1  -     pravastatin (PRAVACHOL) 80 MG tablet; Take 1 tablet by mouth Daily.   Dispense: 90 tablet; Refill: 1  -     Comprehensive Metabolic Panel  -     Lipid Panel    10. Coronary artery calcification seen on CT scan    11. Chronic bronchitis, unspecified chronic bronchitis type  -     Fluticasone Furoate-Vilanterol (Breo Ellipta) 100-25 MCG/ACT aerosol powder ; Inhale 1 puff Daily.  Dispense: 180 each; Refill: 3        Assessment & Plan  1. Medicare annual wellness visit.  - Colon cancer screening is up-to-date until 2028.  - Eye exam is scheduled for 10/2025.  - Prescription for Tdap will be sent to pharmacy.  - He will return on Monday for fasting blood work.    2. Anxiety.  - Reports managing symptoms on his own.  - No longer taking hydroxyzine.  - No changes to current treatment plan.    3. Depression.  - Depression score is notably high.  - Discontinued duloxetine as he feels he does not need it.  - Reports tolerating symptoms despite high depression score.  - Importance of monitoring mental health discussed.    4. Hypertension.  - Currently taking atenolol and lisinopril for blood pressure management.  - Continues with current medication regimen.  - Blood pressure management appears stable.  - No changes to current treatment plan.    5. Hyperlipidemia.  - Taking Zetia and pravastatin for cholesterol management.  - Continues with current medication regimen.  - Cholesterol management appears stable.  - No changes to current treatment plan.    6. Diabetes mellitus.  - Taking Tresiba 65 units daily, and Ozempic once a week.  - Has sufficient supplies at home.  - No new prescription needed at this time.  - Diabetes management appears stable.    7. Suspected ear infection.  - Reports something growing in his left ear.  - Examination revealed no abnormalities or wax buildup.  - No signs of infection or other issues.  - No treatment needed at this time.    8. Medication management.  - Not taking Breo inhaler due to insurance coverage issues.  - Message sent to [facility] RN for patient  assistance.  - Continues with other prescribed medications.  - Medication management reviewed.    Follow-up: A follow-up visit is scheduled in 6 months.               FOLLOW UP  Return in about 6 months (around 1/25/2026) for Next scheduled follow up, medication refills and fasting labs.    Patient was given instructions and counseling regarding his condition or for health maintenance advice. Please see specific information pulled into the AVS if appropriate.     Patient or patient representative verbalized consent for the use of Ambient Listening during the visit with  BIENVENIDO Ross for chart documentation. 7/25/2025  09:24 EDT    BIENVENIDO Ross  07/25/25  10:27 EDT

## 2025-07-28 ENCOUNTER — TELEPHONE (OUTPATIENT)
Dept: FAMILY MEDICINE CLINIC | Facility: CLINIC | Age: 60
End: 2025-07-28

## 2025-07-28 ENCOUNTER — CLINICAL SUPPORT (OUTPATIENT)
Dept: FAMILY MEDICINE CLINIC | Facility: CLINIC | Age: 60
End: 2025-07-28
Payer: MEDICARE

## 2025-07-28 DIAGNOSIS — E11.65 TYPE 2 DIABETES MELLITUS WITH HYPERGLYCEMIA, WITH LONG-TERM CURRENT USE OF INSULIN: ICD-10-CM

## 2025-07-28 DIAGNOSIS — E78.2 MIXED HYPERLIPIDEMIA: Primary | ICD-10-CM

## 2025-07-28 DIAGNOSIS — I10 PRIMARY HYPERTENSION: ICD-10-CM

## 2025-07-28 DIAGNOSIS — Z79.4 TYPE 2 DIABETES MELLITUS WITH HYPERGLYCEMIA, WITH LONG-TERM CURRENT USE OF INSULIN: ICD-10-CM

## 2025-07-28 LAB
ALBUMIN SERPL-MCNC: 4.5 G/DL (ref 3.5–5.2)
ALBUMIN/GLOB SERPL: 1.3 G/DL
ALP SERPL-CCNC: 118 U/L (ref 39–117)
ALT SERPL W P-5'-P-CCNC: 12 U/L (ref 1–41)
ANION GAP SERPL CALCULATED.3IONS-SCNC: 12.1 MMOL/L (ref 5–15)
AST SERPL-CCNC: 14 U/L (ref 1–40)
BASOPHILS # BLD MANUAL: 0.25 10*3/MM3 (ref 0–0.2)
BASOPHILS NFR BLD MANUAL: 2 % (ref 0–1.5)
BILIRUB SERPL-MCNC: 0.4 MG/DL (ref 0–1.2)
BUN SERPL-MCNC: 9 MG/DL (ref 6–20)
BUN/CREAT SERPL: 7.3 (ref 7–25)
CALCIUM SPEC-SCNC: 10.1 MG/DL (ref 8.6–10.5)
CHLORIDE SERPL-SCNC: 100 MMOL/L (ref 98–107)
CHOLEST SERPL-MCNC: 128 MG/DL (ref 0–200)
CO2 SERPL-SCNC: 26.9 MMOL/L (ref 22–29)
CREAT SERPL-MCNC: 1.23 MG/DL (ref 0.76–1.27)
DEPRECATED RDW RBC AUTO: 45.5 FL (ref 37–54)
EGFRCR SERPLBLD CKD-EPI 2021: 67.6 ML/MIN/1.73
EOSINOPHIL # BLD MANUAL: 0.25 10*3/MM3 (ref 0–0.4)
EOSINOPHIL NFR BLD MANUAL: 2 % (ref 0.3–6.2)
ERYTHROCYTE [DISTWIDTH] IN BLOOD BY AUTOMATED COUNT: 15.3 % (ref 12.3–15.4)
GLOBULIN UR ELPH-MCNC: 3.5 GM/DL
GLUCOSE SERPL-MCNC: 133 MG/DL (ref 65–99)
HBA1C MFR BLD: 9.5 % (ref 4.8–5.6)
HCT VFR BLD AUTO: 52.1 % (ref 37.5–51)
HDLC SERPL-MCNC: 59 MG/DL (ref 40–60)
HGB BLD-MCNC: 17.4 G/DL (ref 13–17.7)
LDLC SERPL CALC-MCNC: 49 MG/DL (ref 0–100)
LDLC/HDLC SERPL: 0.79 {RATIO}
LYMPHOCYTES # BLD MANUAL: 5.97 10*3/MM3 (ref 0.7–3.1)
LYMPHOCYTES NFR BLD MANUAL: 9.1 % (ref 5–12)
MCH RBC QN AUTO: 28.2 PG (ref 26.6–33)
MCHC RBC AUTO-ENTMCNC: 33.4 G/DL (ref 31.5–35.7)
MCV RBC AUTO: 84.3 FL (ref 79–97)
MONOCYTES # BLD: 1.14 10*3/MM3 (ref 0.1–0.9)
NEUTROPHILS # BLD AUTO: 4.95 10*3/MM3 (ref 1.7–7)
NEUTROPHILS NFR BLD MANUAL: 39.4 % (ref 42.7–76)
PLAT MORPH BLD: NORMAL
PLATELET # BLD AUTO: 342 10*3/MM3 (ref 140–450)
PMV BLD AUTO: 10.2 FL (ref 6–12)
POIKILOCYTOSIS BLD QL SMEAR: ABNORMAL
POTASSIUM SERPL-SCNC: 4.2 MMOL/L (ref 3.5–5.2)
PROT SERPL-MCNC: 8 G/DL (ref 6–8.5)
PSA SERPL-MCNC: 0.64 NG/ML (ref 0–4)
RBC # BLD AUTO: 6.18 10*6/MM3 (ref 4.14–5.8)
SODIUM SERPL-SCNC: 139 MMOL/L (ref 136–145)
T4 FREE SERPL-MCNC: 0.98 NG/DL (ref 0.92–1.68)
TRIGL SERPL-MCNC: 113 MG/DL (ref 0–150)
TSH SERPL DL<=0.05 MIU/L-ACNC: 2.17 UIU/ML (ref 0.27–4.2)
VARIANT LYMPHS NFR BLD MANUAL: 47.5 % (ref 19.6–45.3)
VLDLC SERPL-MCNC: 20 MG/DL (ref 5–40)
WBC MORPH BLD: NORMAL
WBC NRBC COR # BLD AUTO: 12.56 10*3/MM3 (ref 3.4–10.8)

## 2025-07-28 PROCEDURE — 84439 ASSAY OF FREE THYROXINE: CPT | Performed by: NURSE PRACTITIONER

## 2025-07-28 PROCEDURE — 83036 HEMOGLOBIN GLYCOSYLATED A1C: CPT | Performed by: NURSE PRACTITIONER

## 2025-07-28 PROCEDURE — 80053 COMPREHEN METABOLIC PANEL: CPT | Performed by: NURSE PRACTITIONER

## 2025-07-28 PROCEDURE — 36415 COLL VENOUS BLD VENIPUNCTURE: CPT | Performed by: NURSE PRACTITIONER

## 2025-07-28 PROCEDURE — 85007 BL SMEAR W/DIFF WBC COUNT: CPT | Performed by: NURSE PRACTITIONER

## 2025-07-28 PROCEDURE — 80061 LIPID PANEL: CPT | Performed by: NURSE PRACTITIONER

## 2025-07-28 PROCEDURE — G0103 PSA SCREENING: HCPCS | Performed by: NURSE PRACTITIONER

## 2025-07-28 PROCEDURE — 85025 COMPLETE CBC W/AUTO DIFF WBC: CPT | Performed by: NURSE PRACTITIONER

## 2025-07-28 PROCEDURE — 84443 ASSAY THYROID STIM HORMONE: CPT | Performed by: NURSE PRACTITIONER

## 2025-07-28 NOTE — TELEPHONE ENCOUNTER
Pt requesting a prescription for triamcinolone sent to [facility].  He says he has been putting it on his lips for chapped lips. He said the OTC chap stick doesn't help

## 2025-07-28 NOTE — PROGRESS NOTES
..  Venipuncture Blood Specimen Collection  Venipuncture performed in LT arm by Leandra Beckwith with good hemostasis. Patient tolerated the procedure well without complications.   07/28/25   Stefany Fisher MA

## 2025-07-29 ENCOUNTER — RESULTS FOLLOW-UP (OUTPATIENT)
Dept: FAMILY MEDICINE CLINIC | Facility: CLINIC | Age: 60
End: 2025-07-29
Payer: MEDICARE

## 2025-07-29 NOTE — LETTER
Zechariah MYRA VillelaBernadette  [address]  [facility] [address]    July 29, 2025     Dear Mr. Fleming:    CMP showed glucose of 133 at the time of labs.  Slight elevation in alkaline phosphatase, increased by 1 point.  Kidney function is normal, but does look a little bit low when compared to 5 and 9 months ago.  Stable when compared to 1 year ago.     CBC shows persistent elevation in your white blood cell count, as well as hematocrit.  Differential remains somewhat abnormal but appears stable when compared to a year ago.  It does not appear that you have had any follow-up with hematology since approximately 2022.  I know we have discussed this multiple times.  If you do decide to follow back up with hematology please let me know so I can place a new referral.     A1c is down to 9.5%.  You continue to move in the right direction.  A year ago it was 13.4%.  Right now you are on 1 mg of Ozempic weekly.  We can increase to 2 mg weekly, if you are agreeable, but we need to monitor for side effects such as nausea, vomiting, abdominal pain, constipation, or diarrhea.  I would also want to monitor for any excessive weight loss.  Let me know if this is something you are agreeable to doing.     Lipids are excellent.  Thyroid profile is normal.  Prostate screening antigen is also normal.    Resulted Orders   CBC Auto Differential   Result Value Ref Range    WBC 12.56 (H) 3.40 - 10.80 10*3/mm3    RBC 6.18 (H) 4.14 - 5.80 10*6/mm3    Hemoglobin 17.4 13.0 - 17.7 g/dL    Hematocrit 52.1 (H) 37.5 - 51.0 %    MCV 84.3 79.0 - 97.0 fL    MCH 28.2 26.6 - 33.0 pg    MCHC 33.4 31.5 - 35.7 g/dL    RDW 15.3 12.3 - 15.4 %    RDW-SD 45.5 37.0 - 54.0 fl    MPV 10.2 6.0 - 12.0 fL    Platelets 342 140 - 450 10*3/mm3   Comprehensive Metabolic Panel   Result Value Ref Range    Glucose 133 (H) 65 - 99 mg/dL    BUN 9.0 6.0 - 20.0 mg/dL    Creatinine 1.23 0.76 - 1.27 mg/dL    Sodium 139 136 - 145 mmol/L    Potassium 4.2 3.5 - 5.2 mmol/L    Chloride 100 98 - 107  mmol/L    CO2 26.9 22.0 - 29.0 mmol/L    Calcium 10.1 8.6 - 10.5 mg/dL    Total Protein 8.0 6.0 - 8.5 g/dL    Albumin 4.5 3.5 - 5.2 g/dL    ALT (SGPT) 12 1 - 41 U/L    AST (SGOT) 14 1 - 40 U/L    Alkaline Phosphatase 118 (H) 39 - 117 U/L    Total Bilirubin 0.4 0.0 - 1.2 mg/dL    Globulin 3.5 gm/dL    A/G Ratio 1.3 g/dL    BUN/Creatinine Ratio 7.3 7.0 - 25.0    Anion Gap 12.1 5.0 - 15.0 mmol/L    eGFR 67.6 >60.0 mL/min/1.73   Hemoglobin A1c   Result Value Ref Range    Hemoglobin A1C 9.50 (H) 4.80 - 5.60 %   Lipid Panel   Result Value Ref Range    Total Cholesterol 128 0 - 200 mg/dL    Triglycerides 113 0 - 150 mg/dL    HDL Cholesterol 59 40 - 60 mg/dL    LDL Cholesterol  49 0 - 100 mg/dL    VLDL Cholesterol 20 5 - 40 mg/dL    LDL/HDL Ratio 0.79    TSH+Free T4   Result Value Ref Range    TSH 2.170 0.270 - 4.200 uIU/mL    Free T4 0.98 0.92 - 1.68 ng/dL   PSA Screen   Result Value Ref Range    PSA 0.639 0.000 - 4.000 ng/mL   Manual Differential   Result Value Ref Range    Neutrophil % 39.4 (L) 42.7 - 76.0 %    Lymphocyte % 47.5 (H) 19.6 - 45.3 %    Monocyte % 9.1 5.0 - 12.0 %    Eosinophil % 2.0 0.3 - 6.2 %    Basophil % 2.0 (H) 0.0 - 1.5 %    Neutrophils Absolute 4.95 1.70 - 7.00 10*3/mm3    Lymphocytes Absolute 5.97 (H) 0.70 - 3.10 10*3/mm3    Monocytes Absolute 1.14 (H) 0.10 - 0.90 10*3/mm3    Eosinophils Absolute 0.25 0.00 - 0.40 10*3/mm3    Basophils Absolute 0.25 (H) 0.00 - 0.20 10*3/mm3    Poikilocytes Slight/1+ None Seen    WBC Morphology Normal Normal    Platelet Morphology Normal Normal          Sincerely,        Brittni LOZOYA Vessels, APRN

## 2025-08-01 ENCOUNTER — REFERRAL TRIAGE (OUTPATIENT)
Age: 60
End: 2025-08-01
Payer: MEDICARE

## 2025-08-05 ENCOUNTER — PATIENT OUTREACH (OUTPATIENT)
Age: 60
End: 2025-08-05
Payer: MEDICARE

## 2025-08-22 ENCOUNTER — HOSPITAL ENCOUNTER (OUTPATIENT)
Dept: CT IMAGING | Facility: HOSPITAL | Age: 60
Discharge: HOME | End: 2025-08-22
Payer: MEDICARE

## 2025-08-22 DIAGNOSIS — Z12.2 SCREENING FOR LUNG CANCER: ICD-10-CM

## 2025-08-22 DIAGNOSIS — Z87.891 PERSONAL HISTORY OF TOBACCO USE, PRESENTING HAZARDS TO HEALTH: ICD-10-CM

## 2025-08-22 DIAGNOSIS — F17.218 CIGARETTE NICOTINE DEPENDENCE WITH OTHER NICOTINE-INDUCED DISORDER: ICD-10-CM

## 2025-08-22 PROCEDURE — 71271 CT THORAX LUNG CANCER SCR C-: CPT

## 2025-08-28 ENCOUNTER — PATIENT OUTREACH (OUTPATIENT)
Age: 60
End: 2025-08-28
Payer: MEDICARE

## 2025-08-28 DIAGNOSIS — J18.9 PNEUMONIA OF LEFT LOWER LOBE DUE TO INFECTIOUS ORGANISM: ICD-10-CM

## 2025-08-28 DIAGNOSIS — R93.89 ABNORMAL CT OF THE CHEST: Primary | ICD-10-CM

## 2025-08-28 RX ORDER — AZITHROMYCIN 250 MG/1
TABLET, FILM COATED ORAL
Qty: 6 TABLET | Refills: 0 | Status: SHIPPED | OUTPATIENT
Start: 2025-08-28